# Patient Record
Sex: FEMALE | Race: WHITE | NOT HISPANIC OR LATINO | URBAN - NONMETROPOLITAN AREA
[De-identification: names, ages, dates, MRNs, and addresses within clinical notes are randomized per-mention and may not be internally consistent; named-entity substitution may affect disease eponyms.]

---

## 2017-03-29 ENCOUNTER — OFFICE VISIT (OUTPATIENT)
Dept: GASTROENTEROLOGY | Facility: CLINIC | Age: 62
End: 2017-03-29

## 2017-03-29 VITALS
BODY MASS INDEX: 30.12 KG/M2 | RESPIRATION RATE: 18 BRPM | DIASTOLIC BLOOD PRESSURE: 79 MMHG | TEMPERATURE: 97 F | HEART RATE: 75 BPM | SYSTOLIC BLOOD PRESSURE: 140 MMHG | WEIGHT: 170 LBS | HEIGHT: 63 IN

## 2017-03-29 DIAGNOSIS — R14.0 BLOATING: ICD-10-CM

## 2017-03-29 DIAGNOSIS — R14.2 BURPING: Primary | ICD-10-CM

## 2017-03-29 DIAGNOSIS — R12 HEARTBURN: ICD-10-CM

## 2017-03-29 DIAGNOSIS — K59.09 OTHER CONSTIPATION: ICD-10-CM

## 2017-03-29 PROCEDURE — 99214 OFFICE O/P EST MOD 30 MIN: CPT | Performed by: INTERNAL MEDICINE

## 2017-03-29 RX ORDER — FENOFIBRATE 160 MG/1
TABLET ORAL
Refills: 2 | COMMUNITY
Start: 2017-03-05

## 2017-03-29 RX ORDER — METOCLOPRAMIDE 5 MG/1
2.5 TABLET ORAL
Qty: 30 TABLET | Refills: 1 | Status: SHIPPED | OUTPATIENT
Start: 2017-03-29 | End: 2017-05-26 | Stop reason: SDUPTHER

## 2017-03-29 NOTE — PROGRESS NOTES
[address] [facility]  [address]  [facility] [address]    (H) [phone]  (W)     Chief Complaint   Patient presents with   • Follow-up     History of Present Illness    Patient came back for follow visit today.  She has been complaining of recurrent or pain and bloated feeling and upper abdomen.  She has been having such symptoms for the last couple of months.  The patient has been significantly anxious at times.  Anxiety worsens her symptoms.  She also feels as if her upper abdomen is distended.     The patient has a history of reflux off and on for the last several years.  The reflux is moderately severe.  Symptoms are described as retrosternal burning sensation, and indigestion.  There is history of occasional regurgitative symptoms.  Frequency being several times per week.  The symptoms are worse at night.  The patient takes acid suppressive therapy with reasonable control of his symptoms.    The patient has difficulty swallowing off and for the last 1 year or so. The symptom is moderate in severity, occurs occasionally perhaps once or twice a week and is mostly associated with solid foods.  The symptoms are not progressive.  The patient points towards the lower substernal area.  There is no associated weight loss.  The patient had undergone an upper endoscopy with serial dilation of the Schatzki's ring to 18 mm with significant improvement of her symptoms.  Currently the patient feels better.    The patient has a history of constipation off and on for the last several years. Severity is moderate. This is described as hard stools perhaps one bowel movement twice a week. The patient takes occasional stool softeners and laxatives to have a bowel movement. There is no associated rectal pain.  Her including the patient is taking MiraLAX 17 g on daily basis which leads to one bowel movement every other day.    There is no history of overt GI bleed (Hematemesis, melena or hematochezia).  She denies nausea or vomiting.   There is no abdominal pain.    Review of Systems   Constitutional: Positive for fatigue. Negative for appetite change, chills, fever and unexpected weight change.   HENT: Negative for mouth sores, nosebleeds and trouble swallowing.    Eyes: Negative for discharge and redness.   Respiratory: Negative for apnea, cough and shortness of breath.    Cardiovascular: Negative for chest pain, palpitations and leg swelling.   Gastrointestinal: Positive for abdominal distention, constipation and nausea (belching). Negative for abdominal pain, anal bleeding, blood in stool, diarrhea and vomiting.   Endocrine: Negative for cold intolerance, heat intolerance and polydipsia.   Genitourinary: Negative for dysuria, hematuria and urgency.   Musculoskeletal: Positive for arthralgias. Negative for joint swelling and myalgias.   Skin: Negative for rash.   Allergic/Immunologic: Negative for food allergies and immunocompromised state.   Neurological: Negative for dizziness, seizures, syncope and headaches.   Hematological: Negative for adenopathy. Does not bruise/bleed easily.   Psychiatric/Behavioral: Negative for dysphoric mood. The patient is not nervous/anxious and is not hyperactive.      There is no problem list on file for this patient.    Past Medical History:   Diagnosis Date   • Depression    • GERD (gastroesophageal reflux disease)    • Hyperlipidemia    • Panic attacks      Past Surgical History:   Procedure Laterality Date   • HYSTERECTOMY       Family History   Problem Relation Age of Onset   • Cirrhosis Mother    • Cirrhosis Brother    • Colon cancer Neg Hx      Social History   Substance Use Topics   • Smoking status: Current Every Day Smoker     Packs/day: 0.50     Types: Cigarettes   • Smokeless tobacco: Never Used   • Alcohol use No       Current Outpatient Prescriptions:   •  busPIRone (BUSPAR) 15 MG tablet, TAKE 1 TABLET TWICE A DAY, Disp: , Rfl: 0  •  Cholecalciferol (VITAMIN D3) 01603 UNITS capsule, TAKE 1 CAPSULE  "BY MOUTH ONCE WEEKLY, Disp: , Rfl: 2  •  docusate sodium (COLACE) 100 MG capsule, TAKE 2 CAPSULES BY MOUTH TWICE A DAY AS NEEDED FOR CONSTIPATION, Disp: , Rfl: 0  •  fenofibrate 160 MG tablet, TAKE 1 TABLET BY MOUTH DAILY, Disp: , Rfl: 2  •  ferrous sulfate 325 (65 FE) MG tablet, TAKE 1 TABLET BY MOUTH TWICE DAILY, Disp: , Rfl: 2  •  multivitamin (THERAGRAN) tablet tablet, TAKE 1 TABLET BY MOUTH ONCE DAILY, Disp: , Rfl: 2  •  pantoprazole (PROTONIX) 40 MG EC tablet, Take  by mouth daily., Disp: , Rfl:   •  polyethylene glycol (MIRALAX) powder, TAKE 17G IN 8OZ OF WATER EVERY DAY AS NEEDED, Disp: , Rfl: 2  •  QUEtiapine (SEROquel) 300 MG tablet, TAKE 1 TABLET BY MOUTH AT BEDTIME, Disp: , Rfl: 2  •  rosuvastatin (CRESTOR) 20 MG tablet, TAKE 1 TABLET BY MOUTH DAILY, Disp: , Rfl: 2  •  sertraline (ZOLOFT) 100 MG tablet, TAKE 1 TABLET TWICE A DAY, Disp: , Rfl: 2  •  simethicone (MYLICON) 80 MG chewable tablet, Chew 80 mg every 6 (six) hours as needed for flatulence., Disp: , Rfl:   •  tiZANidine (ZANAFLEX) 4 MG tablet, TAKE 1 TABLET BY MOUTH THREE TIMES A DAY AS NEEDED FOR SPASMS, Disp: , Rfl: 0  •  zolpidem (AMBIEN) 10 MG tablet, Take 10 mg by mouth at night as needed for sleep., Disp: , Rfl:   •  metoclopramide (REGLAN) 5 MG tablet, Take 0.5 tablets by mouth 2 (Two) Times a Day Before Meals., Disp: 30 tablet, Rfl: 1  Allergies   Allergen Reactions   • Penicillins Hives     /79  Pulse 75  Temp 97 °F (36.1 °C)  Resp 18  Ht 63\" (160 cm)  Wt 170 lb (77.1 kg)  BMI 30.11 kg/m2     Physical Exam   Constitutional: She is oriented to person, place, and time. She appears well-developed and well-nourished. No distress.   HENT:   Head: Normocephalic and atraumatic.   Right Ear: Hearing and external ear normal.   Left Ear: Hearing and external ear normal.   Nose: Nose normal.   Mouth/Throat: Oropharynx is clear and moist and mucous membranes are normal. Mucous membranes are not pale, not dry and not cyanotic. No oral " lesions. No oropharyngeal exudate.   Eyes: Conjunctivae and EOM are normal. Right eye exhibits no discharge. Left eye exhibits no discharge. No scleral icterus.   Neck: Trachea normal. Neck supple. No JVD present. No edema present. No thyroid mass and no thyromegaly present.   Cardiovascular: Normal rate, regular rhythm, S2 normal and normal heart sounds.  Exam reveals no gallop, no S3 and no friction rub.    No murmur heard.  Pulmonary/Chest: Effort normal and breath sounds normal. No respiratory distress. She has no wheezes. She has no rales. She exhibits no tenderness.   Abdominal: Soft. Normal appearance and bowel sounds are normal. She exhibits no distension, no ascites and no mass. There is no splenomegaly or hepatomegaly. There is no tenderness. There is no rigidity, no rebound and no guarding. No hernia.   Musculoskeletal: She exhibits no tenderness or deformity.       Vascular Status -  Her exam exhibits no right foot edema. Her exam exhibits no left foot edema.  Lymphadenopathy:     She has no cervical adenopathy.        Left: No supraclavicular adenopathy present.   Neurological: She is alert and oriented to person, place, and time. She has normal strength. No cranial nerve deficit or sensory deficit. She exhibits normal muscle tone. Coordination normal.   Skin: No rash noted. She is not diaphoretic. No cyanosis. No pallor. Nails show no clubbing.   Psychiatric: She has a normal mood and affect. Her behavior is normal. Judgment and thought content normal.   Nursing note and vitals reviewed.     Laboratory Tests:    Upon review of medical records:    Colonoscopy dated 07/15/2014: Diverticulosis involving the left colon with an occasional diverticulum within the ascending colon, colon polyps, small submucosal nodule within the proximal descending colon at 55 cm from anal verge, internal hemorrhoids. Cecum polyp and ascending colon polyp biopsy revealed tubular adenoma, Colon, biopsy at 55 cm revealed benign  colonic mucosa, no increased inflammation, adenomatous changes or hyperplastic changes are identified.     Dated October 3, 2016 Upper endoscopy revealed small sliding hiatal hernia less than 3 cm. Erythematous distal esophagitis. No Simons’s esophagus.  Schatzki's ring. This was dilated serially to 18 mm. Gastric lower body nodule. This may represent carcinoid. Gastritis. Second portion of duodenum, biopsies revealed benign small bowel mucosa with no diagnostic abnormality. Stomach, Lower Body, Nodule, biopsy revealed compatible with reactive gastropathy. Antrum, body and angulus, biopsies revealed reactive antral gastropathy. Mild chronic fundic gastritis.       Assessment and Plan:      There are no diagnoses linked to this encounter.      Discussion:       Plan     Patient Instructions     1. Anti-reflex measures.  2. Pantoprazole 40 mg 1 by mouth every morning one half hour before breakfast.  3. Short course of low-dose Reglan.Reglan (metoclopramide) 5 mg tablets.  Take one half tablet (2.5 mg) by mouth in the morning and in the evening (2 times daily preferably half an hour before food).   4. May take MiraLAX 1-1/4 In 8 ounce glass of water on daily basis.  5. Low fat-moderate lactose-diet with liberal water intake.  6. Weight loss.  7. Follow-up:  8 weeks.          Patient Care Team:  SORIN Carmichael as PCP - General    Rudi Carreon MD

## 2017-03-29 NOTE — PATIENT INSTRUCTIONS
1. Anti-reflex measures.  2. Pantoprazole 40 mg 1 by mouth every morning one half hour before breakfast.  3. Short course of low-dose Reglan.Reglan (metoclopramide) 5 mg tablets.  Take one half tablet (2.5 mg) by mouth in the morning and in the evening (2 times daily preferably half an hour before food).   4. May take MiraLAX 1-1/4 In 8 ounce glass of water on daily basis.  5. Low fat-moderate lactose-diet with liberal water intake.  6. Weight loss.  7. Follow-up:  8 weeks.  8. Discussed with the patient in detail.  Opportunity was given to ask questions.

## 2017-04-10 DIAGNOSIS — R12 HEARTBURN: Primary | ICD-10-CM

## 2017-04-10 RX ORDER — PANTOPRAZOLE SODIUM 40 MG/1
TABLET, DELAYED RELEASE ORAL
Qty: 30 TABLET | Refills: 5 | Status: SHIPPED | OUTPATIENT
Start: 2017-04-10

## 2017-04-13 ENCOUNTER — TELEPHONE (OUTPATIENT)
Dept: GASTROENTEROLOGY | Facility: CLINIC | Age: 62
End: 2017-04-13

## 2017-04-13 NOTE — TELEPHONE ENCOUNTER
90 plus 1 refill of Protonix called into [facility]. [facility] to [facility] 90 day supply instead of the 30.

## 2017-05-26 DIAGNOSIS — R12 HEARTBURN: Primary | ICD-10-CM

## 2017-05-26 RX ORDER — METOCLOPRAMIDE 5 MG/1
2.5 TABLET ORAL
Qty: 30 TABLET | Refills: 1 | Status: SHIPPED | OUTPATIENT
Start: 2017-05-26

## 2017-06-26 ENCOUNTER — TRANSCRIBE ORDERS (OUTPATIENT)
Dept: ULTRASOUND IMAGING | Facility: HOSPITAL | Age: 62
End: 2017-06-26

## 2017-06-26 DIAGNOSIS — R74.8 ABNORMAL LIVER ENZYMES: Primary | ICD-10-CM

## 2017-07-05 ENCOUNTER — HOSPITAL ENCOUNTER (OUTPATIENT)
Dept: ULTRASOUND IMAGING | Facility: HOSPITAL | Age: 62
Discharge: HOME | End: 2017-07-05
Admitting: NURSE PRACTITIONER

## 2017-07-05 DIAGNOSIS — R74.8 ABNORMAL LIVER ENZYMES: ICD-10-CM

## 2017-07-05 PROCEDURE — 76705 ECHO EXAM OF ABDOMEN: CPT

## 2017-07-31 DIAGNOSIS — R12 HEARTBURN: ICD-10-CM

## 2017-07-31 RX ORDER — METOCLOPRAMIDE 5 MG/1
TABLET ORAL
Qty: 30 TABLET | Refills: 0 | OUTPATIENT
Start: 2017-07-31

## 2018-05-11 ENCOUNTER — HOSPITAL ENCOUNTER (OUTPATIENT)
Dept: GENERAL RADIOLOGY | Facility: HOSPITAL | Age: 63
Discharge: HOME | End: 2018-05-11
Admitting: NURSE PRACTITIONER

## 2018-05-11 ENCOUNTER — TRANSCRIBE ORDERS (OUTPATIENT)
Dept: GENERAL RADIOLOGY | Facility: HOSPITAL | Age: 63
End: 2018-05-11

## 2018-05-11 DIAGNOSIS — R60.9 EDEMA, UNSPECIFIED TYPE: ICD-10-CM

## 2018-05-11 DIAGNOSIS — R52 PAIN: ICD-10-CM

## 2018-05-11 DIAGNOSIS — R52 PAIN: Primary | ICD-10-CM

## 2018-05-11 PROCEDURE — 73610 X-RAY EXAM OF ANKLE: CPT

## 2019-01-18 ENCOUNTER — TRANSCRIBE ORDERS (OUTPATIENT)
Dept: ADMINISTRATIVE | Facility: HOSPITAL | Age: 64
End: 2019-01-18

## 2019-01-18 DIAGNOSIS — Z78.0 OSTEOPENIA AFTER MENOPAUSE: ICD-10-CM

## 2019-01-18 DIAGNOSIS — M85.80 OSTEOPENIA AFTER MENOPAUSE: ICD-10-CM

## 2019-01-18 DIAGNOSIS — Z12.39 SCREENING BREAST EXAMINATION: Primary | ICD-10-CM

## 2019-02-27 ENCOUNTER — APPOINTMENT (OUTPATIENT)
Dept: BONE DENSITY | Facility: HOSPITAL | Age: 64
End: 2019-02-27

## 2019-02-27 ENCOUNTER — HOSPITAL ENCOUNTER (OUTPATIENT)
Dept: MAMMOGRAPHY | Facility: HOSPITAL | Age: 64
Discharge: HOME | End: 2019-02-27
Admitting: NURSE PRACTITIONER

## 2019-02-27 DIAGNOSIS — M85.80 OSTEOPENIA AFTER MENOPAUSE: ICD-10-CM

## 2019-02-27 DIAGNOSIS — Z78.0 OSTEOPENIA AFTER MENOPAUSE: ICD-10-CM

## 2019-02-27 DIAGNOSIS — Z12.39 SCREENING BREAST EXAMINATION: ICD-10-CM

## 2019-02-27 PROCEDURE — 77063 BREAST TOMOSYNTHESIS BI: CPT

## 2019-02-27 PROCEDURE — 77067 SCR MAMMO BI INCL CAD: CPT

## 2019-02-27 PROCEDURE — 77080 DXA BONE DENSITY AXIAL: CPT

## 2020-03-16 ENCOUNTER — TRANSCRIBE ORDERS (OUTPATIENT)
Dept: ADMINISTRATIVE | Facility: HOSPITAL | Age: 65
End: 2020-03-16

## 2020-03-16 DIAGNOSIS — Z12.31 VISIT FOR SCREENING MAMMOGRAM: Primary | ICD-10-CM

## 2020-05-05 ENCOUNTER — APPOINTMENT (OUTPATIENT)
Dept: MAMMOGRAPHY | Facility: HOSPITAL | Age: 65
End: 2020-05-05

## 2020-06-15 ENCOUNTER — HOSPITAL ENCOUNTER (OUTPATIENT)
Dept: MAMMOGRAPHY | Facility: HOSPITAL | Age: 65
Discharge: HOME | End: 2020-06-15
Admitting: INTERNAL MEDICINE

## 2020-06-15 DIAGNOSIS — Z12.31 VISIT FOR SCREENING MAMMOGRAM: ICD-10-CM

## 2020-06-15 PROCEDURE — 77063 BREAST TOMOSYNTHESIS BI: CPT

## 2020-06-15 PROCEDURE — 77067 SCR MAMMO BI INCL CAD: CPT

## 2021-07-23 ENCOUNTER — TRANSCRIBE ORDERS (OUTPATIENT)
Dept: ADMINISTRATIVE | Facility: HOSPITAL | Age: 66
End: 2021-07-23

## 2021-07-23 DIAGNOSIS — Z12.31 VISIT FOR SCREENING MAMMOGRAM: Primary | ICD-10-CM

## 2021-09-08 ENCOUNTER — HOSPITAL ENCOUNTER (OUTPATIENT)
Dept: MAMMOGRAPHY | Facility: HOSPITAL | Age: 66
Discharge: HOME | End: 2021-09-08
Admitting: INTERNAL MEDICINE

## 2021-09-08 DIAGNOSIS — Z12.31 VISIT FOR SCREENING MAMMOGRAM: ICD-10-CM

## 2021-09-08 PROCEDURE — 77063 BREAST TOMOSYNTHESIS BI: CPT

## 2021-09-08 PROCEDURE — 77067 SCR MAMMO BI INCL CAD: CPT

## 2022-05-19 ENCOUNTER — TRANSCRIBE ORDERS (OUTPATIENT)
Dept: ADMINISTRATIVE | Facility: HOSPITAL | Age: 67
End: 2022-05-19

## 2022-05-19 DIAGNOSIS — Z12.31 VISIT FOR SCREENING MAMMOGRAM: ICD-10-CM

## 2022-05-19 DIAGNOSIS — N95.8 POSTARTIFICIAL MENOPAUSAL SYNDROME: Primary | ICD-10-CM

## 2022-05-19 DIAGNOSIS — Z13.820 ENCOUNTER FOR SCREENING FOR OSTEOPOROSIS: ICD-10-CM

## 2022-07-08 ENCOUNTER — APPOINTMENT (OUTPATIENT)
Dept: BONE DENSITY | Facility: HOSPITAL | Age: 67
End: 2022-07-08

## 2022-07-08 DIAGNOSIS — N95.8 POSTARTIFICIAL MENOPAUSAL SYNDROME: ICD-10-CM

## 2022-07-08 PROCEDURE — 77080 DXA BONE DENSITY AXIAL: CPT

## 2022-09-09 ENCOUNTER — HOSPITAL ENCOUNTER (OUTPATIENT)
Dept: MAMMOGRAPHY | Facility: HOSPITAL | Age: 67
Discharge: HOME | End: 2022-09-09
Admitting: INTERNAL MEDICINE

## 2022-09-09 DIAGNOSIS — Z12.31 VISIT FOR SCREENING MAMMOGRAM: ICD-10-CM

## 2022-09-09 PROCEDURE — 77067 SCR MAMMO BI INCL CAD: CPT

## 2022-09-09 PROCEDURE — 77063 BREAST TOMOSYNTHESIS BI: CPT

## 2023-04-25 ENCOUNTER — HOSPITAL ENCOUNTER (EMERGENCY)
Facility: HOSPITAL | Age: 68
Discharge: HOME | DRG: 603 | End: 2023-04-25
Attending: EMERGENCY MEDICINE
Payer: MEDICARE

## 2023-04-25 VITALS
HEIGHT: 63 IN | WEIGHT: 160.4 LBS | HEART RATE: 85 BPM | OXYGEN SATURATION: 99 % | RESPIRATION RATE: 18 BRPM | SYSTOLIC BLOOD PRESSURE: 173 MMHG | DIASTOLIC BLOOD PRESSURE: 63 MMHG | TEMPERATURE: 98.6 F | BODY MASS INDEX: 28.42 KG/M2

## 2023-04-25 DIAGNOSIS — L02.211 ABDOMINAL WALL ABSCESS: Primary | ICD-10-CM

## 2023-04-25 DIAGNOSIS — L03.311 CELLULITIS OF ABDOMINAL WALL: ICD-10-CM

## 2023-04-25 LAB
DEPRECATED RDW RBC AUTO: 46.5 FL (ref 37–54)
EOSINOPHIL # BLD MANUAL: 0.61 10*3/MM3 (ref 0–0.4)
EOSINOPHIL NFR BLD MANUAL: 5 % (ref 0.3–6.2)
ERYTHROCYTE [DISTWIDTH] IN BLOOD BY AUTOMATED COUNT: 14 % (ref 12.3–15.4)
HCT VFR BLD AUTO: 36.4 % (ref 34–46.6)
HGB BLD-MCNC: 12.2 G/DL (ref 12–15.9)
LYMPHOCYTES # BLD MANUAL: 1.47 10*3/MM3 (ref 0.7–3.1)
LYMPHOCYTES NFR BLD MANUAL: 4 % (ref 5–12)
MCH RBC QN AUTO: 30.2 PG (ref 26.6–33)
MCHC RBC AUTO-ENTMCNC: 33.5 G/DL (ref 31.5–35.7)
MCV RBC AUTO: 90.1 FL (ref 79–97)
MONOCYTES # BLD: 0.49 10*3/MM3 (ref 0.1–0.9)
NEUTROPHILS # BLD AUTO: 9.65 10*3/MM3 (ref 1.7–7)
NEUTROPHILS NFR BLD MANUAL: 79 % (ref 42.7–76)
PLATELET # BLD AUTO: 211 10*3/MM3 (ref 140–450)
PMV BLD AUTO: 11.2 FL (ref 6–12)
RBC # BLD AUTO: 4.04 10*6/MM3 (ref 3.77–5.28)
RBC MORPH BLD: NORMAL
SCAN SLIDE: NORMAL
SMALL PLATELETS BLD QL SMEAR: ADEQUATE
VARIANT LYMPHS NFR BLD MANUAL: 12 % (ref 19.6–45.3)
WBC MORPH BLD: NORMAL
WBC NRBC COR # BLD: 12.22 10*3/MM3 (ref 3.4–10.8)

## 2023-04-25 PROCEDURE — 85025 COMPLETE CBC W/AUTO DIFF WBC: CPT | Performed by: NURSE PRACTITIONER

## 2023-04-25 PROCEDURE — 36415 COLL VENOUS BLD VENIPUNCTURE: CPT

## 2023-04-25 PROCEDURE — 0J980ZZ DRAINAGE OF ABDOMEN SUBCUTANEOUS TISSUE AND FASCIA, OPEN APPROACH: ICD-10-PCS | Performed by: EMERGENCY MEDICINE

## 2023-04-25 PROCEDURE — 87147 CULTURE TYPE IMMUNOLOGIC: CPT | Performed by: NURSE PRACTITIONER

## 2023-04-25 PROCEDURE — 85007 BL SMEAR W/DIFF WBC COUNT: CPT | Performed by: NURSE PRACTITIONER

## 2023-04-25 PROCEDURE — 87186 SC STD MICRODIL/AGAR DIL: CPT | Performed by: NURSE PRACTITIONER

## 2023-04-25 PROCEDURE — 87070 CULTURE OTHR SPECIMN AEROBIC: CPT | Performed by: NURSE PRACTITIONER

## 2023-04-25 PROCEDURE — 99283 EMERGENCY DEPT VISIT LOW MDM: CPT

## 2023-04-25 RX ORDER — HYDROCODONE BITARTRATE AND ACETAMINOPHEN 5; 325 MG/1; MG/1
1 TABLET ORAL ONCE
Status: COMPLETED | OUTPATIENT
Start: 2023-04-25 | End: 2023-04-25

## 2023-04-25 RX ORDER — SULFAMETHOXAZOLE AND TRIMETHOPRIM 800; 160 MG/1; MG/1
1 TABLET ORAL 2 TIMES DAILY
Qty: 20 TABLET | Refills: 0 | Status: SHIPPED | OUTPATIENT
Start: 2023-04-25 | End: 2023-05-05

## 2023-04-25 RX ORDER — SULFAMETHOXAZOLE AND TRIMETHOPRIM 800; 160 MG/1; MG/1
1 TABLET ORAL ONCE
Status: COMPLETED | OUTPATIENT
Start: 2023-04-25 | End: 2023-04-25

## 2023-04-25 RX ORDER — LIDOCAINE HYDROCHLORIDE AND EPINEPHRINE BITARTRATE 20; .01 MG/ML; MG/ML
10 INJECTION, SOLUTION SUBCUTANEOUS ONCE
Status: COMPLETED | OUTPATIENT
Start: 2023-04-25 | End: 2023-04-25

## 2023-04-25 RX ADMIN — LIDOCAINE HYDROCHLORIDE AND EPINEPHRINE 10 ML: 20; 10 INJECTION, SOLUTION INFILTRATION; PERINEURAL at 20:09

## 2023-04-25 RX ADMIN — SULFAMETHOXAZOLE AND TRIMETHOPRIM 1 TABLET: 800; 160 TABLET ORAL at 20:44

## 2023-04-25 RX ADMIN — HYDROCODONE BITARTRATE AND ACETAMINOPHEN 1 TABLET: 5; 325 TABLET ORAL at 20:44

## 2023-04-25 NOTE — ED PROVIDER NOTES
Subjective  History of Present Illness:    Chief Complaint: Abscess  History of Present Illness: 67-year-old female patient comes into the ED today complaining of an abscess on her peritoneal area.      Nurses Notes reviewed and agree, including vitals, allergies, social history and prior medical history.       Allergies:    Penicillins      Past Surgical History:   Procedure Laterality Date   • HYSTERECTOMY           Social History     Socioeconomic History   • Marital status:    Tobacco Use   • Smoking status: Every Day     Packs/day: 0.50     Types: Cigarettes   • Smokeless tobacco: Never   Substance and Sexual Activity   • Alcohol use: No   • Drug use: No         Family History   Problem Relation Age of Onset   • Cirrhosis Mother    • Cirrhosis Brother    • Breast cancer Sister    • Colon cancer Neg Hx        REVIEW OF SYSTEMS: All systems reviewed and not pertinent unless noted.    Review of Systems   Skin: Positive for color change and wound.   All other systems reviewed and are negative.      Objective    Physical Exam  Vitals and nursing note reviewed.   Constitutional:       Appearance: Normal appearance.   HENT:      Head: Normocephalic and atraumatic.   Eyes:      Extraocular Movements: Extraocular movements intact.      Pupils: Pupils are equal, round, and reactive to light.   Cardiovascular:      Rate and Rhythm: Normal rate and regular rhythm.      Pulses: Normal pulses.      Heart sounds: Normal heart sounds.   Pulmonary:      Effort: Pulmonary effort is normal.      Breath sounds: Normal breath sounds.   Abdominal:      General: Abdomen is flat. Bowel sounds are normal.      Palpations: Abdomen is soft.   Musculoskeletal:      Cervical back: Normal range of motion and neck supple.   Skin:     General: Skin is warm and dry.      Capillary Refill: Capillary refill takes less than 2 seconds.      Findings: Erythema present.      Comments: Moderate erythema surrounding area of abscess that has  purulent drainage present   Neurological:      General: No focal deficit present.      Mental Status: She is alert and oriented to person, place, and time. Mental status is at baseline.      GCS: GCS eye subscore is 4. GCS verbal subscore is 5. GCS motor subscore is 6.      Sensory: Sensation is intact.      Motor: Motor function is intact.      Gait: Gait is intact.   Psychiatric:         Attention and Perception: Attention and perception normal.         Mood and Affect: Mood and affect normal.         Speech: Speech normal.         Behavior: Behavior normal. Behavior is cooperative.           Incision & Drainage    Date/Time: 4/25/2023 8:26 PM  Performed by: Clive Fraire APRN  Authorized by: Mike Colbert MD     Consent:     Consent obtained:  Verbal    Consent given by:  Patient    Risks discussed:  Bleeding, incomplete drainage, infection and pain    Alternatives discussed:  No treatment, delayed treatment and alternative treatment  Universal protocol:     Procedure explained and questions answered to patient or proxy's satisfaction: yes      Relevant documents present and verified: yes      Test results available : no      Imaging studies available: no      Required blood products, implants, devices, and special equipment available: no      Site/side marked: yes      Immediately prior to procedure, a time out was called: yes      Patient identity confirmed:  Verbally with patient  Location:     Type:  Abscess    Location:  Trunk    Trunk location:  Abdomen  Pre-procedure details:     Skin preparation:  Chlorhexidine with alcohol  Sedation:     Sedation type:  None  Anesthesia:     Anesthesia method:  Local infiltration    Local anesthetic:  Lidocaine 2% WITH epi  Procedure type:     Complexity:  Simple  Procedure details:     Ultrasound guidance: no      Needle aspiration: yes      Incision types:  Stab incision    Incision depth:  Subcutaneous    Wound management:  Probed and deloculated     Drainage:  Bloody and purulent    Drainage amount:  Moderate    Wound treatment:  Wound left open    Packing materials:  None  Post-procedure details:     Procedure completion:  Tolerated        ED Course:    ED Course as of 04/25/23 2047 Tue Apr 25, 2023 2044 Strict return precautions given to patient [KH]      ED Course User Index  [KH] Fraire CliveSORIN walls       Lab Results (last 24 hours)     Procedure Component Value Units Date/Time    CBC & Differential [[id number]]  (Abnormal) Collected: 04/25/23 2009    Specimen: Blood Updated: 04/25/23 2039    Narrative:      The following orders were created for panel order CBC & Differential.  Procedure                               Abnormality         Status                     ---------                               -----------         ------                     CBC Auto Differential[[id number]]        Abnormal            Final result               Scan Slide[[id number]]                                       Final result                 Please view results for these tests on the individual orders.    CBC Auto Differential [[id number]]  (Abnormal) Collected: 04/25/23 2009    Specimen: Blood Updated: 04/25/23 2039     WBC 12.22 10*3/mm3      RBC 4.04 10*6/mm3      Hemoglobin 12.2 g/dL      Hematocrit 36.4 %      MCV 90.1 fL      MCH 30.2 pg      MCHC 33.5 g/dL      RDW 14.0 %      RDW-SD 46.5 fl      MPV 11.2 fL      Platelets 211 10*3/mm3     Scan Slide [[id number]] Collected: 04/25/23 2009    Specimen: Blood Updated: 04/25/23 2039     Scan Slide --     Comment: See Manual Differential Results       Manual Differential [[id number]]  (Abnormal) Collected: 04/25/23 2009    Specimen: Blood Updated: 04/25/23 2039     Neutrophil % 79.0 %      Lymphocyte % 12.0 %      Monocyte % 4.0 %      Eosinophil % 5.0 %      Neutrophils Absolute 9.65 10*3/mm3      Lymphocytes Absolute 1.47 10*3/mm3      Monocytes Absolute 0.49 10*3/mm3      Eosinophils Absolute 0.61 10*3/mm3      RBC  Morphology Normal     WBC Morphology Normal     Platelet Estimate Adequate           No radiology results from the last 24 hrs     Medical Decision Making  67-year-old female patient comes into the ED today complaining of abscess and pain to lower abdominal wall.    Physical exam neuro GCS 15 alert and orient x3 responds appropriately questions cardiac regular rate and rhythm S1-S2 positive pulses bilateral upper and lower extremity respiratory clear to auscultation bilaterally abdomen mild tenderness to palpation   lower abdominal wall                                                                                                                                                                                                                             Abdominal wall abscess: acute illness or injury  Cellulitis of abdominal wall: acute illness or injury  Amount and/or Complexity of Data Reviewed  Labs: ordered. Decision-making details documented in ED Course.  Discussion of management or test interpretation with external provider(s): Discussed assessment, treatment and plan with ER attending    Risk  Prescription drug management.    Risk Details: Patient has been diagnosed with abdominal wall abscess, cellulitis and will be discharged home.  Patient requested to follow-up with primary care provider within the next 7 days for reevaluation.                  Final diagnoses:   Abdominal wall abscess   Cellulitis of abdominal wall        Clive Fraire, SORIN  04/25/23 [id number]

## 2023-04-26 NOTE — DISCHARGE INSTRUCTIONS
After 3 doses of antibiotics if pain, swelling has and erythema has not improved please return to the emergency room for reevaluation.

## 2023-04-27 ENCOUNTER — HOSPITAL ENCOUNTER (INPATIENT)
Facility: HOSPITAL | Age: 68
LOS: 1 days | Discharge: HOME | DRG: 603 | End: 2023-04-29
Attending: EMERGENCY MEDICINE | Admitting: INTERNAL MEDICINE
Payer: MEDICARE

## 2023-04-27 ENCOUNTER — APPOINTMENT (OUTPATIENT)
Dept: CT IMAGING | Facility: HOSPITAL | Age: 68
DRG: 603 | End: 2023-04-27
Payer: MEDICARE

## 2023-04-27 DIAGNOSIS — L03.319 CELLULITIS OF SUPRAPUBIC REGION: Primary | ICD-10-CM

## 2023-04-27 LAB
ALBUMIN SERPL-MCNC: 3.4 G/DL (ref 3.5–5.2)
ALBUMIN/GLOB SERPL: 1.1 G/DL
ALP SERPL-CCNC: 51 U/L (ref 39–117)
ALT SERPL W P-5'-P-CCNC: 11 U/L (ref 1–33)
ANION GAP SERPL CALCULATED.3IONS-SCNC: 15.1 MMOL/L (ref 5–15)
AST SERPL-CCNC: 26 U/L (ref 1–32)
BASOPHILS # BLD AUTO: 0.04 10*3/MM3 (ref 0–0.2)
BASOPHILS NFR BLD AUTO: 0.3 % (ref 0–1.5)
BILIRUB SERPL-MCNC: 0.5 MG/DL (ref 0–1.2)
BUN SERPL-MCNC: 25 MG/DL (ref 8–23)
BUN/CREAT SERPL: 21.6 (ref 7–25)
CALCIUM SPEC-SCNC: 9.4 MG/DL (ref 8.6–10.5)
CHLORIDE SERPL-SCNC: 99 MMOL/L (ref 98–107)
CO2 SERPL-SCNC: 17.9 MMOL/L (ref 22–29)
CREAT SERPL-MCNC: 1.16 MG/DL (ref 0.57–1)
CRP SERPL-MCNC: 29.82 MG/DL (ref 0–0.5)
D-LACTATE SERPL-SCNC: 1.4 MMOL/L (ref 0.5–2)
DEPRECATED RDW RBC AUTO: 45 FL (ref 37–54)
EGFRCR SERPLBLD CKD-EPI 2021: 52.1 ML/MIN/1.73
EOSINOPHIL # BLD AUTO: 0.07 10*3/MM3 (ref 0–0.4)
EOSINOPHIL NFR BLD AUTO: 0.5 % (ref 0.3–6.2)
ERYTHROCYTE [DISTWIDTH] IN BLOOD BY AUTOMATED COUNT: 14 % (ref 12.3–15.4)
ERYTHROCYTE [SEDIMENTATION RATE] IN BLOOD: 34 MM/HR (ref 0–30)
GLOBULIN UR ELPH-MCNC: 3.1 GM/DL
GLUCOSE SERPL-MCNC: 105 MG/DL (ref 65–99)
HCT VFR BLD AUTO: 35.6 % (ref 34–46.6)
HGB BLD-MCNC: 12 G/DL (ref 12–15.9)
HOLD SPECIMEN: NORMAL
HOLD SPECIMEN: NORMAL
IMM GRANULOCYTES # BLD AUTO: 0.08 10*3/MM3 (ref 0–0.05)
IMM GRANULOCYTES NFR BLD AUTO: 0.6 % (ref 0–0.5)
LYMPHOCYTES # BLD AUTO: 1.67 10*3/MM3 (ref 0.7–3.1)
LYMPHOCYTES NFR BLD AUTO: 12 % (ref 19.6–45.3)
MCH RBC QN AUTO: 29.6 PG (ref 26.6–33)
MCHC RBC AUTO-ENTMCNC: 33.7 G/DL (ref 31.5–35.7)
MCV RBC AUTO: 87.7 FL (ref 79–97)
MONOCYTES # BLD AUTO: 1.09 10*3/MM3 (ref 0.1–0.9)
MONOCYTES NFR BLD AUTO: 7.9 % (ref 5–12)
NEUTROPHILS NFR BLD AUTO: 10.91 10*3/MM3 (ref 1.7–7)
NEUTROPHILS NFR BLD AUTO: 78.7 % (ref 42.7–76)
NRBC BLD AUTO-RTO: 0 /100 WBC (ref 0–0.2)
PLATELET # BLD AUTO: 220 10*3/MM3 (ref 140–450)
PMV BLD AUTO: 11.5 FL (ref 6–12)
POTASSIUM SERPL-SCNC: 3.3 MMOL/L (ref 3.5–5.2)
PROCALCITONIN SERPL-MCNC: 1.19 NG/ML (ref 0–0.25)
PROT SERPL-MCNC: 6.5 G/DL (ref 6–8.5)
RBC # BLD AUTO: 4.06 10*6/MM3 (ref 3.77–5.28)
SODIUM SERPL-SCNC: 132 MMOL/L (ref 136–145)
T4 FREE SERPL-MCNC: 1.33 NG/DL (ref 0.93–1.7)
WBC NRBC COR # BLD: 13.86 10*3/MM3 (ref 3.4–10.8)
WHOLE BLOOD HOLD COAG: NORMAL
WHOLE BLOOD HOLD SPECIMEN: NORMAL

## 2023-04-27 PROCEDURE — 25010000002 ONDANSETRON PER 1 MG: Performed by: EMERGENCY MEDICINE

## 2023-04-27 PROCEDURE — 84145 PROCALCITONIN (PCT): CPT | Performed by: PHYSICIAN ASSISTANT

## 2023-04-27 PROCEDURE — 25010000002 VANCOMYCIN 5 G RECONSTITUTED SOLUTION: Performed by: PHYSICIAN ASSISTANT

## 2023-04-27 PROCEDURE — 80053 COMPREHEN METABOLIC PANEL: CPT | Performed by: EMERGENCY MEDICINE

## 2023-04-27 PROCEDURE — 84439 ASSAY OF FREE THYROXINE: CPT | Performed by: INTERNAL MEDICINE

## 2023-04-27 PROCEDURE — 83605 ASSAY OF LACTIC ACID: CPT | Performed by: PHYSICIAN ASSISTANT

## 2023-04-27 PROCEDURE — 74177 CT ABD & PELVIS W/CONTRAST: CPT

## 2023-04-27 PROCEDURE — 86140 C-REACTIVE PROTEIN: CPT | Performed by: PHYSICIAN ASSISTANT

## 2023-04-27 PROCEDURE — 96375 TX/PRO/DX INJ NEW DRUG ADDON: CPT

## 2023-04-27 PROCEDURE — 25510000001 IOPAMIDOL 61 % SOLUTION: Performed by: EMERGENCY MEDICINE

## 2023-04-27 PROCEDURE — 96365 THER/PROPH/DIAG IV INF INIT: CPT

## 2023-04-27 PROCEDURE — 99284 EMERGENCY DEPT VISIT MOD MDM: CPT

## 2023-04-27 PROCEDURE — 87040 BLOOD CULTURE FOR BACTERIA: CPT | Performed by: PHYSICIAN ASSISTANT

## 2023-04-27 PROCEDURE — 85652 RBC SED RATE AUTOMATED: CPT | Performed by: PHYSICIAN ASSISTANT

## 2023-04-27 PROCEDURE — G0378 HOSPITAL OBSERVATION PER HR: HCPCS

## 2023-04-27 PROCEDURE — 85025 COMPLETE CBC W/AUTO DIFF WBC: CPT | Performed by: EMERGENCY MEDICINE

## 2023-04-27 PROCEDURE — 96361 HYDRATE IV INFUSION ADD-ON: CPT

## 2023-04-27 PROCEDURE — 36415 COLL VENOUS BLD VENIPUNCTURE: CPT

## 2023-04-27 PROCEDURE — 25010000002 MORPHINE PER 10 MG: Performed by: EMERGENCY MEDICINE

## 2023-04-27 RX ORDER — PRAVASTATIN SODIUM 20 MG
40 TABLET ORAL NIGHTLY
Status: DISCONTINUED | OUTPATIENT
Start: 2023-04-27 | End: 2023-04-29 | Stop reason: HOSPADM

## 2023-04-27 RX ORDER — TRAZODONE HYDROCHLORIDE 50 MG/1
100 TABLET ORAL NIGHTLY
COMMUNITY

## 2023-04-27 RX ORDER — DULOXETIN HYDROCHLORIDE 30 MG/1
30 CAPSULE, DELAYED RELEASE ORAL DAILY
Status: DISCONTINUED | OUTPATIENT
Start: 2023-04-28 | End: 2023-04-29 | Stop reason: HOSPADM

## 2023-04-27 RX ORDER — GABAPENTIN 400 MG/1
400 CAPSULE ORAL NIGHTLY
Status: DISCONTINUED | OUTPATIENT
Start: 2023-04-27 | End: 2023-04-29 | Stop reason: HOSPADM

## 2023-04-27 RX ORDER — MORPHINE SULFATE 2 MG/ML
2 INJECTION, SOLUTION INTRAMUSCULAR; INTRAVENOUS ONCE
Status: COMPLETED | OUTPATIENT
Start: 2023-04-27 | End: 2023-04-27

## 2023-04-27 RX ORDER — DOCUSATE SODIUM 100 MG/1
100 CAPSULE, LIQUID FILLED ORAL 2 TIMES DAILY
Status: DISCONTINUED | OUTPATIENT
Start: 2023-04-27 | End: 2023-04-29 | Stop reason: HOSPADM

## 2023-04-27 RX ORDER — PANTOPRAZOLE SODIUM 40 MG/1
40 TABLET, DELAYED RELEASE ORAL
Status: DISCONTINUED | OUTPATIENT
Start: 2023-04-28 | End: 2023-04-29 | Stop reason: HOSPADM

## 2023-04-27 RX ORDER — FENOFIBRATE 145 MG/1
145 TABLET, COATED ORAL DAILY
Status: DISCONTINUED | OUTPATIENT
Start: 2023-04-28 | End: 2023-04-29 | Stop reason: HOSPADM

## 2023-04-27 RX ORDER — CETIRIZINE HYDROCHLORIDE 10 MG/1
10 TABLET ORAL DAILY
COMMUNITY

## 2023-04-27 RX ORDER — GABAPENTIN 400 MG/1
400 CAPSULE ORAL NIGHTLY
COMMUNITY

## 2023-04-27 RX ORDER — ZOLPIDEM TARTRATE 5 MG/1
10 TABLET ORAL NIGHTLY PRN
Status: DISCONTINUED | OUTPATIENT
Start: 2023-04-27 | End: 2023-04-29 | Stop reason: HOSPADM

## 2023-04-27 RX ORDER — DULOXETIN HYDROCHLORIDE 30 MG/1
30 CAPSULE, DELAYED RELEASE ORAL DAILY
COMMUNITY

## 2023-04-27 RX ORDER — PRAVASTATIN SODIUM 40 MG
40 TABLET ORAL NIGHTLY
COMMUNITY
End: 2023-04-29 | Stop reason: HOSPADM

## 2023-04-27 RX ORDER — SIMETHICONE 80 MG
80 TABLET,CHEWABLE ORAL EVERY 6 HOURS PRN
Status: DISCONTINUED | OUTPATIENT
Start: 2023-04-27 | End: 2023-04-29 | Stop reason: HOSPADM

## 2023-04-27 RX ORDER — ZOLPIDEM TARTRATE 5 MG/1
10 TABLET ORAL NIGHTLY PRN
Status: DISCONTINUED | OUTPATIENT
Start: 2023-04-27 | End: 2023-04-27 | Stop reason: SDUPTHER

## 2023-04-27 RX ORDER — METRONIDAZOLE 500 MG/100ML
500 INJECTION, SOLUTION INTRAVENOUS ONCE
Status: COMPLETED | OUTPATIENT
Start: 2023-04-27 | End: 2023-04-27

## 2023-04-27 RX ORDER — ONDANSETRON 2 MG/ML
4 INJECTION INTRAMUSCULAR; INTRAVENOUS ONCE
Status: COMPLETED | OUTPATIENT
Start: 2023-04-27 | End: 2023-04-27

## 2023-04-27 RX ORDER — SODIUM CHLORIDE 0.9 % (FLUSH) 0.9 %
10 SYRINGE (ML) INJECTION AS NEEDED
Status: DISCONTINUED | OUTPATIENT
Start: 2023-04-27 | End: 2023-04-29 | Stop reason: HOSPADM

## 2023-04-27 RX ADMIN — MORPHINE SULFATE 2 MG: 2 INJECTION, SOLUTION INTRAMUSCULAR; INTRAVENOUS at 18:19

## 2023-04-27 RX ADMIN — IOPAMIDOL 100 ML: 612 INJECTION, SOLUTION INTRAVENOUS at 18:47

## 2023-04-27 RX ADMIN — PRAVASTATIN SODIUM 40 MG: 20 TABLET ORAL at 22:31

## 2023-04-27 RX ADMIN — SODIUM CHLORIDE 1000 ML: 9 INJECTION, SOLUTION INTRAVENOUS at 20:00

## 2023-04-27 RX ADMIN — ONDANSETRON 4 MG: 2 INJECTION INTRAMUSCULAR; INTRAVENOUS at 18:20

## 2023-04-27 RX ADMIN — SODIUM CHLORIDE 1000 ML: 9 INJECTION, SOLUTION INTRAVENOUS at 18:20

## 2023-04-27 RX ADMIN — ZOLPIDEM TARTRATE 10 MG: 5 TABLET ORAL at 22:31

## 2023-04-27 RX ADMIN — METRONIDAZOLE 500 MG: 5 INJECTION, SOLUTION INTRAVENOUS at 19:28

## 2023-04-27 RX ADMIN — VANCOMYCIN HYDROCHLORIDE 1500 MG: 5 INJECTION, POWDER, LYOPHILIZED, FOR SOLUTION INTRAVENOUS at 20:44

## 2023-04-27 RX ADMIN — GABAPENTIN 400 MG: 400 CAPSULE ORAL at 22:31

## 2023-04-27 RX ADMIN — DOCUSATE SODIUM 100 MG: 100 CAPSULE, LIQUID FILLED ORAL at 22:39

## 2023-04-27 RX ADMIN — AZTREONAM 2 G: 2 INJECTION, POWDER, LYOPHILIZED, FOR SOLUTION INTRAMUSCULAR; INTRAVENOUS at 18:54

## 2023-04-27 NOTE — ED PROVIDER NOTES
"Subjective  History of Present Illness:    Chief Complaint: Abscess  History of Present Illness: 67-year-old female presents with an abscess in her lower abdominal area, she states its been there for approximately 4 days, seen in the emergency department 2 days ago and had an incision and drainage of the area, but she states that it has not improved and appears to be getting worse.  The area is painful, red, and swollen.  It is very tender to touch  Onset: Gradual onset  Duration: 4 days  Exacerbating / Alleviating factors: Painful to touch and with movement red and swollen  Associated symptoms: Pain      Nurses Notes reviewed and agree, including vitals, allergies, social history and prior medical history.     REVIEW OF SYSTEMS: All systems reviewed and not pertinent unless noted.    Review of Systems   Skin:        Abscess lower abdomen   All other systems reviewed and are negative.      Past Medical History:   Diagnosis Date   • Depression    • GERD (gastroesophageal reflux disease)    • Hyperlipidemia    • Panic attacks        Allergies:    Penicillins      Past Surgical History:   Procedure Laterality Date   • HYSTERECTOMY           Social History     Socioeconomic History   • Marital status:    Tobacco Use   • Smoking status: Every Day     Packs/day: 0.50     Types: Cigarettes   • Smokeless tobacco: Never   Vaping Use   • Vaping Use: Every day   Substance and Sexual Activity   • Alcohol use: No   • Drug use: No   • Sexual activity: Defer         Family History   Problem Relation Age of Onset   • Cirrhosis Mother    • Cirrhosis Brother    • Breast cancer Sister    • Colon cancer Neg Hx        Objective  Physical Exam:  /63 (BP Location: Left arm, Patient Position: Sitting)   Pulse 86   Temp 97.3 °F (36.3 °C) (Oral)   Resp 18   Ht 160 cm (63\")   Wt 72.6 kg (160 lb)   SpO2 98%   BMI 28.34 kg/m²      Physical Exam  Vitals and nursing note reviewed.   Constitutional:       Appearance: She is " well-developed.   HENT:      Head: Normocephalic and atraumatic.   Cardiovascular:      Rate and Rhythm: Normal rate and regular rhythm.   Pulmonary:      Effort: Pulmonary effort is normal.      Breath sounds: Normal breath sounds.   Abdominal:          Comments: Cellulitis extending from the right groin over the suprapubic area inferiorly to the vaginal area, with a large fluctuating indurated area in the upper portion of the cellulitis, very painful to touch warm   Musculoskeletal:         General: Normal range of motion.      Cervical back: Normal range of motion and neck supple.   Skin:     General: Skin is warm and dry.   Neurological:      Mental Status: She is alert and oriented to person, place, and time.      Deep Tendon Reflexes: Reflexes are normal and symmetric.           Procedures    ED Course:    ED Course as of 04/27/23 1949   Thu Apr 27, 2023 1939 Discussed with Dr. Barboza, he accepted the patient for admission [CS]      ED Course User Index  [CS] Pramod Marie Jr., HUGH       Lab Results (last 24 hours)     Procedure Component Value Units Date/Time    Sedimentation Rate [[id number]]  (Abnormal) Collected: 04/27/23 1624    Specimen: Blood Updated: 04/27/23 1829     Sed Rate 34 mm/hr     CBC & Differential [[id number]]  (Abnormal) Collected: 04/27/23 1627    Specimen: Blood Updated: 04/27/23 1637    Narrative:      The following orders were created for panel order CBC & Differential.  Procedure                               Abnormality         Status                     ---------                               -----------         ------                     CBC Auto Differential[[id number]]        Abnormal            Final result                 Please view results for these tests on the individual orders.    Comprehensive Metabolic Panel [[id number]]  (Abnormal) Collected: 04/27/23 1627    Specimen: Blood Updated: 04/27/23 1655     Glucose 105 mg/dL      BUN 25 mg/dL      Creatinine 1.16 mg/dL       Sodium 132 mmol/L      Potassium 3.3 mmol/L      Chloride 99 mmol/L      CO2 17.9 mmol/L      Calcium 9.4 mg/dL      Total Protein 6.5 g/dL      Albumin 3.4 g/dL      ALT (SGPT) 11 U/L      AST (SGOT) 26 U/L      Alkaline Phosphatase 51 U/L      Total Bilirubin 0.5 mg/dL      Globulin 3.1 gm/dL      A/G Ratio 1.1 g/dL      BUN/Creatinine Ratio 21.6     Anion Gap 15.1 mmol/L      eGFR 52.1 mL/min/1.73     Narrative:      GFR Normal >60  Chronic Kidney Disease <60  Kidney Failure <15      CBC Auto Differential [[id number]]  (Abnormal) Collected: 04/27/23 1627    Specimen: Blood Updated: 04/27/23 1637     WBC 13.86 10*3/mm3      RBC 4.06 10*6/mm3      Hemoglobin 12.0 g/dL      Hematocrit 35.6 %      MCV 87.7 fL      MCH 29.6 pg      MCHC 33.7 g/dL      RDW 14.0 %      RDW-SD 45.0 fl      MPV 11.5 fL      Platelets 220 10*3/mm3      Neutrophil % 78.7 %      Lymphocyte % 12.0 %      Monocyte % 7.9 %      Eosinophil % 0.5 %      Basophil % 0.3 %      Immature Grans % 0.6 %      Neutrophils, Absolute 10.91 10*3/mm3      Lymphocytes, Absolute 1.67 10*3/mm3      Monocytes, Absolute 1.09 10*3/mm3      Eosinophils, Absolute 0.07 10*3/mm3      Basophils, Absolute 0.04 10*3/mm3      Immature Grans, Absolute 0.08 10*3/mm3      nRBC 0.0 /100 WBC     Procalcitonin [[id number]]  (Abnormal) Collected: 04/27/23 1627    Specimen: Blood Updated: 04/27/23 1931     Procalcitonin 1.19 ng/mL     Narrative:      As a Marker for Sepsis (Non-Neonates):    1. <0.5 ng/mL represents a low risk of severe sepsis and/or septic shock.  2. >2 ng/mL represents a high risk of severe sepsis and/or septic shock.    As a Marker for Lower Respiratory Tract Infections that require antibiotic therapy:    PCT on Admission    Antibiotic Therapy       6-12 Hrs later    >0.5                Strongly Recommended  >0.25 - <0.5        Recommended   0.1 - 0.25          Discouraged              Remeasure/reassess PCT  <0.1                Strongly Discouraged      "Remeasure/reassess PCT    As 28 day mortality risk marker: \"Change in Procalcitonin Result\" (>80% or <=80%) if Day 0 (or Day 1) and Day 4 values are available. Refer to http://[email or web address]-pct-calculator.com    Change in PCT <=80%  A decrease of PCT levels below or equal to 80% defines a positive change in PCT test result representing a higher risk for 28-day all-cause mortality of patients diagnosed with severe sepsis for septic shock.    Change in PCT >80%  A decrease of PCT levels of more than 80% defines a negative change in PCT result representing a lower risk for 28-day all-cause mortality of patients diagnosed with severe sepsis or septic shock.       C-reactive Protein [[id number]]  (Abnormal) Collected: 04/27/23 1627    Specimen: Blood Updated: 04/27/23 1937     C-Reactive Protein 29.82 mg/dL     Lactic Acid, Plasma [[id number]]  (Normal) Collected: 04/27/23 1826    Specimen: Blood from Arm, Left Updated: 04/27/23 1901     Lactate 1.4 mmol/L     Blood Culture With TYRON - Blood, Arm, Left [[id number]] Collected: 04/27/23 1826    Specimen: Blood from Arm, Left Updated: 04/27/23 1843    Blood Culture With TYRON - Blood, Arm, Left [[id number]] Collected: 04/27/23 1834    Specimen: Blood from Arm, Left Updated: 04/27/23 1843           CT Abdomen Pelvis With Contrast    Result Date: 4/27/2023  PROCEDURE: CT ABDOMEN PELVIS W CONTRAST-  HISTORY: suprapubic/pelvic abscess  Comparison: September 13, 2016  FINDINGS: Axial CT images of the abdomen and pelvis were obtained with IV contrast only. Coronal reformatted images were also obtained. This study was performed with techniques to keep radiation doses as low as reasonably achievable, (ALARA). Individualized dose reduction techniques using automated exposure control or adjustment of mA and/or kV according to the patient size were employed.  There is mild emphysema in the lung bases. The liver has an unremarkable appearance, without evidence of mass. Postoperative changes are " seen from cholecystectomy. There is no evidence of biliary ductal dilatation. The pancreas appears normal. The spleen size is within normal limits. There is no evidence of renal mass or hydronephrosis. There is no evidence of adenopathy. No abnormal fluid collection is seen. No localized inflammatory process is identified. Severe vascular calcifications are noted.  Images of the pelvis reveal multiple fluid-filled small bowel loops in a nonspecific pattern. Multiple diverticula are noted in the sigmoid colon. The appendix is unremarkable. Stranding is noted of the suprapubic anterior subcutaneous fat system with edema or cellulitis. No well-defined focal fluid collection is seen to suggest an abscess. Several borderline sized inguinal nodes are seen.       Impression: Suprapubic subcutaneous fat stranding consistent with edema or cellulitis. No well-defined abscess is identified.  Small amount of pelvic free fluid likely reactive.  Multiple fluid-filled small bowel loops in a nonspecific pattern but could represent an enteritis.    This report was signed and finalized on 4/27/2023 7:23 PM by Pramod Baron MD.         Medical Decision Making  67-year-old female presents with cellulitis in her suprapubic region, she had a incision and drainage performed 2 days earlier and was sent home on antibiotics, returns with worsening symptoms.  Differential would include worsening cellulitis, abscess, edema.  I evaluate the patient the bedside, she had a large suprapubic reddened swollen area, an IV was established, blood cultures were drawn, and she was placed on broad-spectrum antibiotics.  The patient was also given IV pain medicine.  A CT scan was performed that showed edema versus cellulitis, she had an elevated white count and elevated procalcitonin.  I reviewed and interpreted all labs myself and discussed with the patient and family at bedside, also discussed the results of the scans with the patient and family at  bedside.  I reviewed and summarized previous medical records including her last ED visit, labs, and any radiology exams.  Upon reevaluation, her pain is improved she remained stable.  I discussed the case with the hospitalist Dr. Barboza, and recommended admission due to failed oral antibiotics.  She will be admitted for IV antibiotics and further evaluation    Cellulitis of suprapubic region: acute illness or injury  Amount and/or Complexity of Data Reviewed  Labs: ordered. Decision-making details documented in ED Course.  Radiology: ordered. Decision-making details documented in ED Course.  ECG/medicine tests:  Decision-making details documented in ED Course.      Risk  Prescription drug management.  Decision regarding hospitalization.            Final diagnoses:   Cellulitis of suprapubic region        Pramod Marie Jr., PA-C  04/27/23 1949

## 2023-04-28 LAB
ALBUMIN SERPL-MCNC: 2.8 G/DL (ref 3.5–5.2)
ALBUMIN/GLOB SERPL: 1.2 G/DL
ALP SERPL-CCNC: 43 U/L (ref 39–117)
ALT SERPL W P-5'-P-CCNC: 9 U/L (ref 1–33)
ANION GAP SERPL CALCULATED.3IONS-SCNC: 8.2 MMOL/L (ref 5–15)
AST SERPL-CCNC: 23 U/L (ref 1–32)
BACTERIA SPEC AEROBE CULT: ABNORMAL
BILIRUB SERPL-MCNC: 0.4 MG/DL (ref 0–1.2)
BUN SERPL-MCNC: 15 MG/DL (ref 8–23)
BUN/CREAT SERPL: 21.7 (ref 7–25)
CALCIUM SPEC-SCNC: 8.3 MG/DL (ref 8.6–10.5)
CHLORIDE SERPL-SCNC: 106 MMOL/L (ref 98–107)
CO2 SERPL-SCNC: 21.8 MMOL/L (ref 22–29)
CREAT SERPL-MCNC: 0.69 MG/DL (ref 0.57–1)
DEPRECATED RDW RBC AUTO: 48.3 FL (ref 37–54)
EGFRCR SERPLBLD CKD-EPI 2021: 95.9 ML/MIN/1.73
ERYTHROCYTE [DISTWIDTH] IN BLOOD BY AUTOMATED COUNT: 14.6 % (ref 12.3–15.4)
GLOBULIN UR ELPH-MCNC: 2.4 GM/DL
GLUCOSE SERPL-MCNC: 87 MG/DL (ref 65–99)
HBA1C MFR BLD: 5.1 % (ref 4.8–5.6)
HCT VFR BLD AUTO: 30.9 % (ref 34–46.6)
HGB BLD-MCNC: 10.1 G/DL (ref 12–15.9)
MCH RBC QN AUTO: 29.5 PG (ref 26.6–33)
MCHC RBC AUTO-ENTMCNC: 32.7 G/DL (ref 31.5–35.7)
MCV RBC AUTO: 90.4 FL (ref 79–97)
MRSA DNA SPEC QL NAA+PROBE: NORMAL
PLATELET # BLD AUTO: 191 10*3/MM3 (ref 140–450)
PMV BLD AUTO: 12.1 FL (ref 6–12)
POTASSIUM SERPL-SCNC: 3.2 MMOL/L (ref 3.5–5.2)
PROT SERPL-MCNC: 5.2 G/DL (ref 6–8.5)
RBC # BLD AUTO: 3.42 10*6/MM3 (ref 3.77–5.28)
SODIUM SERPL-SCNC: 136 MMOL/L (ref 136–145)
TSH SERPL DL<=0.05 MIU/L-ACNC: 2.68 UIU/ML (ref 0.27–4.2)
VANCOMYCIN SERPL-MCNC: 5.8 MCG/ML (ref 5–40)
WBC NRBC COR # BLD: 6.9 10*3/MM3 (ref 3.4–10.8)

## 2023-04-28 PROCEDURE — 84443 ASSAY THYROID STIM HORMONE: CPT | Performed by: INTERNAL MEDICINE

## 2023-04-28 PROCEDURE — 0 CEFAZOLIN SODIUM-DEXTROSE 1-4 GM-%(50ML) RECONSTITUTED SOLUTION: Performed by: INTERNAL MEDICINE

## 2023-04-28 PROCEDURE — 87641 MR-STAPH DNA AMP PROBE: CPT | Performed by: INTERNAL MEDICINE

## 2023-04-28 PROCEDURE — 83036 HEMOGLOBIN GLYCOSYLATED A1C: CPT | Performed by: INTERNAL MEDICINE

## 2023-04-28 PROCEDURE — 85027 COMPLETE CBC AUTOMATED: CPT | Performed by: INTERNAL MEDICINE

## 2023-04-28 PROCEDURE — 80202 ASSAY OF VANCOMYCIN: CPT | Performed by: INTERNAL MEDICINE

## 2023-04-28 PROCEDURE — 80053 COMPREHEN METABOLIC PANEL: CPT | Performed by: INTERNAL MEDICINE

## 2023-04-28 PROCEDURE — 25010000002 VANCOMYCIN 1 G RECONSTITUTED SOLUTION

## 2023-04-28 RX ORDER — IBUPROFEN 200 MG
600 TABLET ORAL EVERY 8 HOURS SCHEDULED
Status: DISCONTINUED | OUTPATIENT
Start: 2023-04-28 | End: 2023-04-29 | Stop reason: HOSPADM

## 2023-04-28 RX ORDER — QUETIAPINE FUMARATE 50 MG/1
50 TABLET, FILM COATED ORAL NIGHTLY
COMMUNITY

## 2023-04-28 RX ORDER — LEVOTHYROXINE SODIUM 0.07 MG/1
75 TABLET ORAL DAILY
COMMUNITY

## 2023-04-28 RX ORDER — IBUPROFEN 600 MG/1
600 TABLET ORAL EVERY 8 HOURS PRN
COMMUNITY

## 2023-04-28 RX ORDER — DOCUSATE SODIUM, 50 MG SENNOSIDES, 8.6 MG 8.6; 5 1/1; 1/1
1 CAPSULE, GELATIN COATED ORAL DAILY
COMMUNITY

## 2023-04-28 RX ORDER — TIZANIDINE 4 MG/1
8 TABLET ORAL NIGHTLY PRN
COMMUNITY

## 2023-04-28 RX ORDER — POTASSIUM CHLORIDE 1.5 G/1.77G
20 POWDER, FOR SOLUTION ORAL 2 TIMES DAILY
Status: DISCONTINUED | OUTPATIENT
Start: 2023-04-28 | End: 2023-04-29 | Stop reason: HOSPADM

## 2023-04-28 RX ORDER — CEFAZOLIN SODIUM 1 G/50ML
1 SOLUTION INTRAVENOUS EVERY 6 HOURS
Status: DISCONTINUED | OUTPATIENT
Start: 2023-04-28 | End: 2023-04-29 | Stop reason: HOSPADM

## 2023-04-28 RX ORDER — HYDROCODONE BITARTRATE AND ACETAMINOPHEN 5; 325 MG/1; MG/1
1 TABLET ORAL EVERY 6 HOURS PRN
Status: DISCONTINUED | OUTPATIENT
Start: 2023-04-28 | End: 2023-04-29 | Stop reason: HOSPADM

## 2023-04-28 RX ADMIN — PANTOPRAZOLE SODIUM 40 MG: 40 TABLET, DELAYED RELEASE ORAL at 06:33

## 2023-04-28 RX ADMIN — FENOFIBRATE 145 MG: 145 TABLET, FILM COATED ORAL at 10:35

## 2023-04-28 RX ADMIN — DULOXETINE 30 MG: 30 CAPSULE, DELAYED RELEASE ORAL at 10:35

## 2023-04-28 RX ADMIN — DOCUSATE SODIUM 100 MG: 100 CAPSULE, LIQUID FILLED ORAL at 20:54

## 2023-04-28 RX ADMIN — IBUPROFEN 600 MG: 200 TABLET, FILM COATED ORAL at 21:00

## 2023-04-28 RX ADMIN — HYDROCODONE BITARTRATE AND ACETAMINOPHEN 1 TABLET: 5; 325 TABLET ORAL at 10:35

## 2023-04-28 RX ADMIN — ZOLPIDEM TARTRATE 10 MG: 5 TABLET ORAL at 20:54

## 2023-04-28 RX ADMIN — HYDROCODONE BITARTRATE AND ACETAMINOPHEN 1 TABLET: 5; 325 TABLET ORAL at 16:36

## 2023-04-28 RX ADMIN — GABAPENTIN 400 MG: 400 CAPSULE ORAL at 20:54

## 2023-04-28 RX ADMIN — CEFAZOLIN SODIUM 1 G: 1 SOLUTION INTRAVENOUS at 20:53

## 2023-04-28 RX ADMIN — SODIUM CHLORIDE 1000 MG: 900 INJECTION, SOLUTION INTRAVENOUS at 10:40

## 2023-04-28 RX ADMIN — POTASSIUM CHLORIDE 20 MEQ: 1.5 POWDER, FOR SOLUTION ORAL at 10:35

## 2023-04-28 RX ADMIN — PRAVASTATIN SODIUM 40 MG: 20 TABLET ORAL at 20:54

## 2023-04-28 RX ADMIN — POTASSIUM CHLORIDE 20 MEQ: 1.5 POWDER, FOR SOLUTION ORAL at 20:54

## 2023-04-28 RX ADMIN — DOCUSATE SODIUM 100 MG: 100 CAPSULE, LIQUID FILLED ORAL at 10:35

## 2023-04-28 NOTE — PAYOR COMM NOTE
"To:  Crozier  From: Denia Bennett RN  Phone: [phone]  Fax: [phone]  NPI: [id number]  TIN: [id number]  Member ID: [id number]   MRN: [id number]    Leann Antonio (67 y.o. Female)       Date of Birth   1955    Social Security Number       Address   [address] [place] [address]    Home Phone   [phone]    MRN   [id number]       Pentecostalism   Islam    Marital Status                               Admission Date   4/27/23    Admission Type   Emergency    Admitting Provider   Ulises Coombs MD    Attending Provider   Ulises Coombs MD    Department, Room/Bed   [facility] TELEMETRY 4, 423/1       Discharge Date       Discharge Disposition       Discharge Destination                                 Attending Provider: Ulises Coombs MD    Allergies: Penicillins    Isolation: Contact   Infection: None   Code Status: CPR    Ht: 160 cm (63\")   Wt: 76 kg (167 lb 8.8 oz)    Admission Cmt: None   Principal Problem: Cellulitis of suprapubic region [L03.319]                   Active Insurance as of 4/27/2023       Primary Coverage       Payor Plan Insurance Group Employer/Plan Group    ANTHEM MEDICARE REPLACEMENT ANTHEM MEDICARE ADVANTAGE [id number]       Payor Plan Address Payor Plan Phone Number Payor Plan Fax Number Effective Dates    [address] [phone]  1/1/2021 - None Entered    [facility] [phone]         Subscriber Name Subscriber Birth Date Member ID       LEANN ANTONIO 1955 [id number]               Secondary Coverage       Payor Plan Insurance Group Employer/Plan Group    [place]        Payor Plan Address Payor Plan Phone Number Payor Plan Fax Number Effective Dates    [address]   1/1/2014 - None Entered    [facility] [phone]         Subscriber Name Subscriber Birth Date Member ID       LEANN ANTONIO 1955 [id number]                     Emergency Contacts       Contact " Person (Rel.) Home Phone Work Phone Mobile Phone    Zabrina Lambert (Daughter) [phone] -- --                 History & Physical        Ulises Coombs MD at 23 [phone]              [facility]   HISTORY AND PHYSICAL      Name:  Leann Antonio   Age:  67 y.o.  Sex:  female  :  1955  MRN:  [id number]   Visit Number:  [id number]  Admission Date:  2023  Date Of Service:  23  Primary Care Physician:  Ulises Coombs MD     Admitting diagnosis:      Cellulitis of suprapubic region    Depression    Hyperlipidemia    Hypothyroidism (acquired)        History Of Presenting Illness:      67-year-old patient with history of depression, hypothyroidism, hyperlipidemia, GERD, neuropathy, who came on  to the ER because of pain swelling and abscess over the suprapubic region.  She had I&D done and was discharged on oral Bactrim but got worse so she came to the ER again.  Upon evaluation she was noted to have cellulitis and has failed outpatient antibiotics so patient has been admitted for further IV antibiotic and under observation.  She states that she has been having pain and has been uncomfortable with walking.  There is no fever or chills.  There has been some serous drainage from the incision which has been done  Labs have been reviewed with the scan results as below    Review Of Systems:     The following systems were reviewed and negative;  constitution, eyes, ENT, respiratory, cardiovascular, gastrointestinal, genitourinary, musculoskeletal, neurological and behavioral/psych,  Skin except as above.     Past Medical History:    Past Medical History:   Diagnosis Date    Depression     GERD (gastroesophageal reflux disease)     Hyperlipidemia     Panic attacks        Past Surgical history:    Past Surgical History:   Procedure Laterality Date    CHOLECYSTECTOMY      HYSTERECTOMY         Social History:    Social History     Socioeconomic History    Marital  status:    Tobacco Use    Smoking status: Former     Packs/day: 0.50     Types: Cigarettes    Smokeless tobacco: Never   Vaping Use    Vaping Use: Every day   Substance and Sexual Activity    Alcohol use: No    Drug use: No    Sexual activity: Defer       Family History:    Family History   Problem Relation Age of Onset    Cirrhosis Mother     Cirrhosis Brother     Breast cancer Sister     Colon cancer Neg Hx          Allergies:      Penicillins    Home Medications:    Prior to Admission Medications       Prescriptions Last Dose Informant Patient Reported? Taking?    busPIRone (BUSPAR) 15 MG tablet 4/27/2023  Yes Yes    TAKE 1 TABLET TWICE A DAY    cetirizine (zyrTEC) 10 MG tablet 4/27/2023  Yes Yes    Take 1 tablet by mouth Daily.    Cholecalciferol (VITAMIN D3) 92427 UNITS capsule   Yes No    TAKE 1 CAPSULE BY MOUTH ONCE WEEKLY    docusate sodium (COLACE) 100 MG capsule Past Week  Yes Yes    TAKE 2 CAPSULES BY MOUTH TWICE A DAY AS NEEDED FOR CONSTIPATION    DULoxetine (CYMBALTA) 30 MG capsule 4/27/2023  Yes Yes    Take 1 capsule by mouth Daily.    fenofibrate 160 MG tablet 4/26/2023  Yes Yes    TAKE 1 TABLET BY MOUTH DAILY    ferrous sulfate 325 (65 FE) MG tablet   Yes No    TAKE 1 TABLET BY MOUTH TWICE DAILY    gabapentin (NEURONTIN) 400 MG capsule 4/26/2023  Yes Yes    Take 1 capsule by mouth Every Night.    metoclopramide (REGLAN) 5 MG tablet 4/27/2023  No Yes    Take 0.5 tablets by mouth 2 (Two) Times a Day Before Meals.    multivitamin (THERAGRAN) tablet tablet   Yes No    TAKE 1 TABLET BY MOUTH ONCE DAILY    pantoprazole (PROTONIX) 40 MG EC tablet Unknown  No No    Take 1 tablet in the am 30 minutes before breakfast.    polyethylene glycol (MIRALAX) powder   Yes No    TAKE 17G IN 8OZ OF WATER EVERY DAY AS NEEDED    pravastatin (PRAVACHOL) 40 MG tablet 4/26/2023  Yes Yes    Take 1 tablet by mouth Every Night.    QUEtiapine (SEROquel) 300 MG tablet   Yes No    TAKE 1 TABLET BY MOUTH AT BEDTIME     rosuvastatin (CRESTOR) 20 MG tablet   Yes No    TAKE 1 TABLET BY MOUTH DAILY    sertraline (ZOLOFT) 100 MG tablet   Yes No    TAKE 1 TABLET TWICE A DAY    simethicone (MYLICON) 80 MG chewable tablet Past Week  Yes Yes    Chew 1 tablet Every 6 (Six) Hours As Needed for Flatulence.    sulfamethoxazole-trimethoprim (BACTRIM DS,SEPTRA DS) 800-160 MG per tablet 4/27/2023  No Yes    Take 1 tablet by mouth 2 (Two) Times a Day for 10 days.    traZODone (DESYREL) 50 MG tablet 4/26/2023  Yes Yes    Take 2 tablets by mouth Every Night.    zolpidem (AMBIEN) 10 MG tablet 4/26/2023  Yes Yes    Take 1 tablet by mouth At Night As Needed for Sleep.                   Vital Signs:    Temp:  [97.3 °F (36.3 °C)] 97.3 °F (36.3 °C)  Heart Rate:  [65-86] 65  Resp:  [16-18] 16  BP: (115-144)/(53-63) 144/53        04/27/23  1607   Weight: 72.6 kg (160 lb)       Body mass index is 28.34 kg/m².    Physical Exam:      General Appearance:    Alert and cooperative,  And lying comfortably in bed   Head:    Atraumatic and normocephalic, without obvious abnormality.   Eyes:            PERRLA, conjunctivae and sclerae normal, no Icterus. No pallor. Extraocular movements are within normal limits.   Ears:    Ears appear intact with no abnormalities noted.   Throat:   No oral lesions, no thrush, oral mucosa moist.   Neck:   Supple, trachea midline, no thyromegaly, no carotid bruit, no lymphadenopathy   Lungs:    Breath sounds heard bilaterally equally.  No crackles or wheezing. No Pleural rub or bronchial breathing.       Heart:    Normal S1 and S2, no murmur, no gallop, no rub. No JVD   Abdomen:     Normal bowel sounds, no masses, no organomegaly. Soft   nontender, nondistended, no guarding, no rebound    tenderness   Extremities:   Moves all extremities well, no edema, no cyanosis, no          clubbing   Skin:  There is edema with induration around 4 to 5 inches surrounding the incision over the suprapubic region with redness tenderness and swelling  "  Neurologic:   Cranial nerves 2 - 12 grossly intact, sensation intact, Motor power is normal and equal bilaterally.       EKG:     not done    Labs:    Lab Results (last 24 hours)       Procedure Component Value Units Date/Time    C-reactive Protein [[id number]]  (Abnormal) Collected: 04/27/23 1627    Specimen: Blood Updated: 04/27/23 1937     C-Reactive Protein 29.82 mg/dL     Procalcitonin [[id number]]  (Abnormal) Collected: 04/27/23 1627    Specimen: Blood Updated: 04/27/23 1931     Procalcitonin 1.19 ng/mL     Narrative:      As a Marker for Sepsis (Non-Neonates):    1. <0.5 ng/mL represents a low risk of severe sepsis and/or septic shock.  2. >2 ng/mL represents a high risk of severe sepsis and/or septic shock.    As a Marker for Lower Respiratory Tract Infections that require antibiotic therapy:    PCT on Admission    Antibiotic Therapy       6-12 Hrs later    >0.5                Strongly Recommended  >0.25 - <0.5        Recommended   0.1 - 0.25          Discouraged              Remeasure/reassess PCT  <0.1                Strongly Discouraged     Remeasure/reassess PCT    As 28 day mortality risk marker: \"Change in Procalcitonin Result\" (>80% or <=80%) if Day 0 (or Day 1) and Day 4 values are available. Refer to http://www.[employer]-pct-calculator.com    Change in PCT <=80%  A decrease of PCT levels below or equal to 80% defines a positive change in PCT test result representing a higher risk for 28-day all-cause mortality of patients diagnosed with severe sepsis for septic shock.    Change in PCT >80%  A decrease of PCT levels of more than 80% defines a negative change in PCT result representing a lower risk for 28-day all-cause mortality of patients diagnosed with severe sepsis or septic shock.       Lactic Acid, Plasma [[id number]]  (Normal) Collected: 04/27/23 1826    Specimen: Blood from Arm, Left Updated: 04/27/23 1901     Lactate 1.4 mmol/L     Blood Culture With TYRON - Blood, Arm, Left [[id number]] Collected: " 04/27/23 1826    Specimen: Blood from Arm, Left Updated: 04/27/23 1843    Blood Culture With TYRON - Blood, Arm, Left [[id number]] Collected: 04/27/23 1834    Specimen: Blood from Arm, Left Updated: 04/27/23 1843    Sedimentation Rate [[id number]]  (Abnormal) Collected: 04/27/23 1624    Specimen: Blood Updated: 04/27/23 1829     Sed Rate 34 mm/hr     [facility] Draw [[id number]] Collected: 04/27/23 1627    Specimen: Blood Updated: 04/27/23 1730    Narrative:      The following orders were created for panel order [facility] Draw.  Procedure                               Abnormality         Status                     ---------                               -----------         ------                     Green Top (Gel)[[id number]]                                  Final result               Lavender Top[[id number]]                                     Final result               Gold Top - SST[[id number]]                                   Final result               Light Blue Top[[id number]]                                   Final result                 Please view results for these tests on the individual orders.    Light Blue Top [[id number]] Collected: 04/27/23 1627    Specimen: Blood Updated: 04/27/23 1730     Extra Tube Hold for add-ons.     Comment: Auto resulted       Lavender Top [[id number]] Collected: 04/27/23 1627    Specimen: Blood Updated: 04/27/23 1730     Extra Tube hold for add-on     Comment: Auto resulted       Gold Top - SST [[id number]] Collected: 04/27/23 1627    Specimen: Blood Updated: 04/27/23 1730     Extra Tube Hold for add-ons.     Comment: Auto resulted.       Green Top (Gel) [[id number]] Collected: 04/27/23 1627    Specimen: Blood Updated: 04/27/23 1730     Extra Tube Hold for add-ons.     Comment: Auto resulted.       Comprehensive Metabolic Panel [[id number]]  (Abnormal) Collected: 04/27/23 1627    Specimen: Blood Updated: 04/27/23 1655     Glucose 105 mg/dL      BUN 25 mg/dL      Creatinine 1.16 mg/dL       Sodium 132 mmol/L      Potassium 3.3 mmol/L      Chloride 99 mmol/L      CO2 17.9 mmol/L      Calcium 9.4 mg/dL      Total Protein 6.5 g/dL      Albumin 3.4 g/dL      ALT (SGPT) 11 U/L      AST (SGOT) 26 U/L      Alkaline Phosphatase 51 U/L      Total Bilirubin 0.5 mg/dL      Globulin 3.1 gm/dL      A/G Ratio 1.1 g/dL      BUN/Creatinine Ratio 21.6     Anion Gap 15.1 mmol/L      eGFR 52.1 mL/min/1.73     Narrative:      GFR Normal >60  Chronic Kidney Disease <60  Kidney Failure <15      CBC & Differential [[id number]]  (Abnormal) Collected: 04/27/23 1627    Specimen: Blood Updated: 04/27/23 1637    Narrative:      The following orders were created for panel order CBC & Differential.  Procedure                               Abnormality         Status                     ---------                               -----------         ------                     CBC Auto Differential[[id number]]        Abnormal            Final result                 Please view results for these tests on the individual orders.    CBC Auto Differential [[id number]]  (Abnormal) Collected: 04/27/23 1627    Specimen: Blood Updated: 04/27/23 1637     WBC 13.86 10*3/mm3      RBC 4.06 10*6/mm3      Hemoglobin 12.0 g/dL      Hematocrit 35.6 %      MCV 87.7 fL      MCH 29.6 pg      MCHC 33.7 g/dL      RDW 14.0 %      RDW-SD 45.0 fl      MPV 11.5 fL      Platelets 220 10*3/mm3      Neutrophil % 78.7 %      Lymphocyte % 12.0 %      Monocyte % 7.9 %      Eosinophil % 0.5 %      Basophil % 0.3 %      Immature Grans % 0.6 %      Neutrophils, Absolute 10.91 10*3/mm3      Lymphocytes, Absolute 1.67 10*3/mm3      Monocytes, Absolute 1.09 10*3/mm3      Eosinophils, Absolute 0.07 10*3/mm3      Basophils, Absolute 0.04 10*3/mm3      Immature Grans, Absolute 0.08 10*3/mm3      nRBC 0.0 /100 WBC             Radiology:    Imaging Results (Last 72 Hours)       Procedure Component Value Units Date/Time    CT Abdomen Pelvis With Contrast [[id number]] Collected:  04/27/23 1920     Updated: 04/27/23 1925    Narrative:      PROCEDURE: CT ABDOMEN PELVIS W CONTRAST-     HISTORY: suprapubic/pelvic abscess     Comparison: September 13, 2016     FINDINGS: Axial CT images of the abdomen and pelvis were obtained with  IV contrast only. Coronal reformatted images were also obtained. This  study was performed with techniques to keep radiation doses as low as  reasonably achievable, (ALARA). Individualized dose reduction techniques  using automated exposure control or adjustment of mA and/or kV according  to the patient size were employed.     There is mild emphysema in the lung bases. The liver has an unremarkable  appearance, without evidence of mass. Postoperative changes are seen  from cholecystectomy. There is no evidence of biliary ductal dilatation.  The pancreas appears normal. The spleen size is within normal limits.  There is no evidence of renal mass or hydronephrosis. There is no  evidence of adenopathy. No abnormal fluid collection is seen. No  localized inflammatory process is identified. Severe vascular  calcifications are noted.     Images of the pelvis reveal multiple fluid-filled small bowel loops in a  nonspecific pattern. Multiple diverticula are noted in the sigmoid  colon. The appendix is unremarkable. Stranding is noted of the  suprapubic anterior subcutaneous fat system with edema or cellulitis. No  well-defined focal fluid collection is seen to suggest an abscess.  Several borderline sized inguinal nodes are seen.           Impression:      Suprapubic subcutaneous fat stranding consistent with edema  or cellulitis. No well-defined abscess is identified.     Small amount of pelvic free fluid likely reactive.     Multiple fluid-filled small bowel loops in a nonspecific pattern but  could represent an enteritis.           This report was signed and finalized on 4/27/2023 7:23 PM by Pramod Baron MD.            Assessment:    Assessment & Plan       Cellulitis of  suprapubic region    Depression    Hyperlipidemia    Hypothyroidism (acquired)        Plan:     1.  Cellulitis of the suprapubic region-s/p I&D her white count is around 14,000 with clinical signs symptoms for typical bacterial cellulitis.  We will start with aztreonam as well as vancomycin and see if he can get cultures and treat accordingly    Continue rest of the medication for orders and will add medication for pain if she is having severe pain over the surrounding area  Plan of care has been addressed with the patient and see rest as per jenni Coombs MD  04/27/23  22:08 EDT    Please note that portions of this note were completed with a voice recognition program.    Electronically signed by Ulises Coombs MD at 04/28/23 0920       Vital Signs (last day)       Date/Time Temp Temp src Pulse Resp BP Patient Position SpO2    04/28/23 1115 97.3 (36.3) Oral 73 19 -- -- 97    04/28/23 0712 97.5 (36.4) Oral 74 18 126/54 Lying 96    04/28/23 0356 97.3 (36.3) Oral 74 18 119/49 Lying 93    04/27/23 2358 98.1 (36.7) Oral 69 16 94/58 Lying 94    04/27/23 2036 97.3 (36.3) Axillary 65 16 144/53 Lying 100    04/27/23 1607 97.3 (36.3) Oral 86 18 115/63 Sitting 98          Current Facility-Administered Medications   Medication Dose Route Frequency Provider Last Rate Last Admin    aztreonam (AZACTAM) 2 g/100 mL 0.9% NS (mbp)  2 g Intravenous Q8H Ulises Coombs MD   Stopped at 04/28/23 0625    docusate sodium (COLACE) capsule 100 mg  100 mg Oral BID Ulises Coombs MD   100 mg at 04/28/23 1035    DULoxetine (CYMBALTA) DR capsule 30 mg  30 mg Oral Daily Ulises Coombs MD   30 mg at 04/28/23 1035    fenofibrate (TRICOR) tablet 145 mg  145 mg Oral Daily Ulises Coombs MD   145 mg at 04/28/23 1035    gabapentin (NEURONTIN) capsule 400 mg  400 mg Oral Nightly Ulises Coombs MD   400 mg at 04/27/23 2231    HYDROcodone-acetaminophen (NORCO) 5-325 MG per tablet 1 tablet  1 tablet Oral Q6H PRN Ulises Coombs MD    1 tablet at 04/28/23 1035    ibuprofen (ADVIL,MOTRIN) tablet 600 mg  600 mg Oral Q8H Ulises Coombs MD        pantoprazole (PROTONIX) EC tablet 40 mg  40 mg Oral Q AM Ulises Coombs MD   40 mg at 04/28/23 0633    Pharmacy to dose vancomycin   Does not apply Continuous PRN Ulises Coombs MD        potassium chloride (KLOR-CON) packet 20 mEq  20 mEq Oral BID Ulises Coombs MD   20 mEq at 04/28/23 1035    pravastatin (PRAVACHOL) tablet 40 mg  40 mg Oral Nightly Ulises Coombs MD   40 mg at 04/27/23 2231    simethicone (MYLICON) chewable tablet 80 mg  80 mg Oral Q6H PRN Ulises Coombs MD        sodium chloride 0.9 % flush 10 mL  10 mL Intravenous PRN Ulises Coombs MD        vancomycin 1 g/250 mL 0.9% NS (vial-mate)  1,000 mg Intravenous Q12H Luis E Frazier RPH   1,000 mg at 04/28/23 1040    zolpidem (AMBIEN) tablet 10 mg  10 mg Oral Nightly PRN Ulises Coombs MD   10 mg at 04/27/23 2231     Lab Results (last 24 hours)       Procedure Component Value Units Date/Time    MRSA Screen, PCR (Inpatient) - Swab, Nares [[id number]] Collected: 04/28/23 0637    Specimen: Swab from Nares Updated: 04/28/23 0836    Blood Culture With TYRON - Blood, Arm, Left [[id number]]  (Normal) Collected: 04/27/23 1826    Specimen: Blood from Arm, Left Updated: 04/28/23 0645     Blood Culture No growth at less than 24 hours    Blood Culture With TYRON - Blood, Arm, Left [[id number]]  (Normal) Collected: 04/27/23 1834    Specimen: Blood from Arm, Left Updated: 04/28/23 0645     Blood Culture No growth at less than 24 hours    TSH [[id number]]  (Normal) Collected: 04/28/23 0457    Specimen: Blood Updated: 04/28/23 0609     TSH 2.680 uIU/mL     Hemoglobin A1c [[id number]]  (Normal) Collected: 04/28/23 0457    Specimen: Blood Updated: 04/28/23 0606     Hemoglobin A1C 5.10 %     Narrative:      Hemoglobin A1C Ranges:    Increased Risk for Diabetes  5.7% to 6.4%  Diabetes                     >= 6.5%  Diabetic Goal                < 7.0%     Vancomycin, Random [[id number]]  (Normal) Collected: 04/28/23 0457    Specimen: Blood Updated: 04/28/23 0558     Vancomycin Random 5.80 mcg/mL     Narrative:      Therapeutic Ranges for Vancomycin    Vancomycin Random   5.0-40.0 mcg/mL  Vancomycin Trough   5.0-20.0 mcg/mL  Vancomycin Peak     20.0-40.0 mcg/mL    Comprehensive Metabolic Panel [[id number]]  (Abnormal) Collected: 04/28/23 0457    Specimen: Blood Updated: 04/28/23 0558     Glucose 87 mg/dL      BUN 15 mg/dL      Creatinine 0.69 mg/dL      Sodium 136 mmol/L      Potassium 3.2 mmol/L      Chloride 106 mmol/L      CO2 21.8 mmol/L      Calcium 8.3 mg/dL      Total Protein 5.2 g/dL      Albumin 2.8 g/dL      ALT (SGPT) 9 U/L      AST (SGOT) 23 U/L      Alkaline Phosphatase 43 U/L      Total Bilirubin 0.4 mg/dL      Globulin 2.4 gm/dL      A/G Ratio 1.2 g/dL      BUN/Creatinine Ratio 21.7     Anion Gap 8.2 mmol/L      eGFR 95.9 mL/min/1.73     Narrative:      GFR Normal >60  Chronic Kidney Disease <60  Kidney Failure <15      CBC (No Diff) [[id number]]  (Abnormal) Collected: 04/28/23 0457    Specimen: Blood Updated: 04/28/23 0555     WBC 6.90 10*3/mm3      RBC 3.42 10*6/mm3      Hemoglobin 10.1 g/dL      Hematocrit 30.9 %      MCV 90.4 fL      MCH 29.5 pg      MCHC 32.7 g/dL      RDW 14.6 %      RDW-SD 48.3 fl      MPV 12.1 fL      Platelets 191 10*3/mm3     T4, Free [[id number]]  (Normal) Collected: 04/27/23 1627    Specimen: Blood Updated: 04/27/23 2242     Free T4 1.33 ng/dL     Narrative:      Results may be falsely increased if patient taking Biotin.      C-reactive Protein [[id number]]  (Abnormal) Collected: 04/27/23 1627    Specimen: Blood Updated: 04/27/23 1937     C-Reactive Protein 29.82 mg/dL     Procalcitonin [[id number]]  (Abnormal) Collected: 04/27/23 1627    Specimen: Blood Updated: 04/27/23 1931     Procalcitonin 1.19 ng/mL     Narrative:      As a Marker for Sepsis (Non-Neonates):    1. <0.5 ng/mL represents a low risk of severe sepsis and/or  "septic shock.  2. >2 ng/mL represents a high risk of severe sepsis and/or septic shock.    As a Marker for Lower Respiratory Tract Infections that require antibiotic therapy:    PCT on Admission    Antibiotic Therapy       6-12 Hrs later    >0.5                Strongly Recommended  >0.25 - <0.5        Recommended   0.1 - 0.25          Discouraged              Remeasure/reassess PCT  <0.1                Strongly Discouraged     Remeasure/reassess PCT    As 28 day mortality risk marker: \"Change in Procalcitonin Result\" (>80% or <=80%) if Day 0 (or Day 1) and Day 4 values are available. Refer to http://www.[employer] [facility] [place]-pct-calculator.[facility]    Change in PCT <=80%  A decrease of PCT levels below or equal to 80% defines a positive change in PCT test result representing a higher risk for 28-day all-cause mortality of patients diagnosed with severe sepsis for septic shock.    Change in PCT >80%  A decrease of PCT levels of more than 80% defines a negative change in PCT result representing a lower risk for 28-day all-cause mortality of patients diagnosed with severe sepsis or septic shock.       Lactic Acid, Plasma [[id number]]  (Normal) Collected: 04/27/23 1826    Specimen: Blood from Arm, Left Updated: 04/27/23 1901     Lactate 1.4 mmol/L     Sedimentation Rate [[id number]]  (Abnormal) Collected: 04/27/23 1624    Specimen: Blood Updated: 04/27/23 1829     Sed Rate 34 mm/hr     Mobile Draw [[id number]] Collected: 04/27/23 1627    Specimen: Blood Updated: 04/27/23 1730    Narrative:      The following orders were created for panel order Mobile Draw.  Procedure                               Abnormality         Status                     ---------                               -----------         ------                     Green Top (Gel)[[id number]]                                  Final result               Lavender Top[[id number]]                                     Final result               Gold Top - SST[[id number]]                  "                  Final result               Light Blue Top[[id number]]                                   Final result                 Please view results for these tests on the individual orders.    Light Blue Top [[id number]] Collected: 04/27/23 1627    Specimen: Blood Updated: 04/27/23 1730     Extra Tube Hold for add-ons.     Comment: Auto resulted       Lavender Top [[id number]] Collected: 04/27/23 1627    Specimen: Blood Updated: 04/27/23 1730     Extra Tube hold for add-on     Comment: Auto resulted       Gold Top - SST [[id number]] Collected: 04/27/23 1627    Specimen: Blood Updated: 04/27/23 1730     Extra Tube Hold for add-ons.     Comment: Auto resulted.       Green Top (Gel) [[id number]] Collected: 04/27/23 1627    Specimen: Blood Updated: 04/27/23 1730     Extra Tube Hold for add-ons.     Comment: Auto resulted.       Comprehensive Metabolic Panel [[id number]]  (Abnormal) Collected: 04/27/23 1627    Specimen: Blood Updated: 04/27/23 1655     Glucose 105 mg/dL      BUN 25 mg/dL      Creatinine 1.16 mg/dL      Sodium 132 mmol/L      Potassium 3.3 mmol/L      Chloride 99 mmol/L      CO2 17.9 mmol/L      Calcium 9.4 mg/dL      Total Protein 6.5 g/dL      Albumin 3.4 g/dL      ALT (SGPT) 11 U/L      AST (SGOT) 26 U/L      Alkaline Phosphatase 51 U/L      Total Bilirubin 0.5 mg/dL      Globulin 3.1 gm/dL      A/G Ratio 1.1 g/dL      BUN/Creatinine Ratio 21.6     Anion Gap 15.1 mmol/L      eGFR 52.1 mL/min/1.73     Narrative:      GFR Normal >60  Chronic Kidney Disease <60  Kidney Failure <15      CBC & Differential [[id number]]  (Abnormal) Collected: 04/27/23 1627    Specimen: Blood Updated: 04/27/23 1637    Narrative:      The following orders were created for panel order CBC & Differential.  Procedure                               Abnormality         Status                     ---------                               -----------         ------                     CBC Auto Differential[[id number]]        Abnormal             Final result                 Please view results for these tests on the individual orders.    CBC Auto Differential [[id number]]  (Abnormal) Collected: 04/27/23 1627    Specimen: Blood Updated: 04/27/23 1637     WBC 13.86 10*3/mm3      RBC 4.06 10*6/mm3      Hemoglobin 12.0 g/dL      Hematocrit 35.6 %      MCV 87.7 fL      MCH 29.6 pg      MCHC 33.7 g/dL      RDW 14.0 %      RDW-SD 45.0 fl      MPV 11.5 fL      Platelets 220 10*3/mm3      Neutrophil % 78.7 %      Lymphocyte % 12.0 %      Monocyte % 7.9 %      Eosinophil % 0.5 %      Basophil % 0.3 %      Immature Grans % 0.6 %      Neutrophils, Absolute 10.91 10*3/mm3      Lymphocytes, Absolute 1.67 10*3/mm3      Monocytes, Absolute 1.09 10*3/mm3      Eosinophils, Absolute 0.07 10*3/mm3      Basophils, Absolute 0.04 10*3/mm3      Immature Grans, Absolute 0.08 10*3/mm3      nRBC 0.0 /100 WBC           Imaging Results (Last 24 Hours)       Procedure Component Value Units Date/Time    CT Abdomen Pelvis With Contrast [[id number]] Collected: 04/27/23 1920     Updated: 04/27/23 1925    Narrative:      PROCEDURE: CT ABDOMEN PELVIS W CONTRAST-     HISTORY: suprapubic/pelvic abscess     Comparison: September 13, 2016     FINDINGS: Axial CT images of the abdomen and pelvis were obtained with  IV contrast only. Coronal reformatted images were also obtained. This  study was performed with techniques to keep radiation doses as low as  reasonably achievable, (ALARA). Individualized dose reduction techniques  using automated exposure control or adjustment of mA and/or kV according  to the patient size were employed.     There is mild emphysema in the lung bases. The liver has an unremarkable  appearance, without evidence of mass. Postoperative changes are seen  from cholecystectomy. There is no evidence of biliary ductal dilatation.  The pancreas appears normal. The spleen size is within normal limits.  There is no evidence of renal mass or hydronephrosis. There is  no  evidence of adenopathy. No abnormal fluid collection is seen. No  localized inflammatory process is identified. Severe vascular  calcifications are noted.     Images of the pelvis reveal multiple fluid-filled small bowel loops in a  nonspecific pattern. Multiple diverticula are noted in the sigmoid  colon. The appendix is unremarkable. Stranding is noted of the  suprapubic anterior subcutaneous fat system with edema or cellulitis. No  well-defined focal fluid collection is seen to suggest an abscess.  Several borderline sized inguinal nodes are seen.           Impression:      Suprapubic subcutaneous fat stranding consistent with edema  or cellulitis. No well-defined abscess is identified.     Small amount of pelvic free fluid likely reactive.     Multiple fluid-filled small bowel loops in a nonspecific pattern but  could represent an enteritis.           This report was signed and finalized on 4/27/2023 7:23 PM by Pramod Baron MD.          Orders (last 24 hrs)        Start     Ordered    04/29/23 0600  Vancomycin, Random  Morning Draw         04/27/23 2249 04/29/23 0600  Basic Metabolic Panel  Morning Draw         04/28/23 0055    04/29/23 0600  CBC (No Diff)  Once         04/28/23 0055    04/29/23 0600  CBC (No Diff)  Morning Draw         04/28/23 0923    04/29/23 0600  Comprehensive Metabolic Panel  Morning Draw         04/28/23 0923    04/28/23 1400  ibuprofen (ADVIL,MOTRIN) tablet 600 mg  Every 8 Hours Scheduled         04/28/23 0922    04/28/23 1102  Inpatient Admission  Once         04/28/23 1101    04/28/23 1100  vancomycin 1250 mg/250 mL 0.9% NS IVPB ([facility])  Every 24 Hours,   Status:  Discontinued         04/27/23 2249 04/28/23 1015  potassium chloride (KLOR-CON) packet 20 mEq  2 Times Daily         04/28/23 0927    04/28/23 0923  Code Status and Medical Interventions:  Continuous         04/28/23 0923    04/28/23 0923  Place Sequential Compression Device  Once         04/28/23 0923    04/28/23  0923  Maintain Sequential Compression Device  Continuous         04/28/23 0923 04/28/23 0923  Diet: Regular/House Diet; Texture: Regular Texture (IDDSI 7); Fluid Consistency: Thin (IDDSI 0)  Diet Effective Now         04/28/23 0923 04/28/23 0921  HYDROcodone-acetaminophen (NORCO) 5-325 MG per tablet 1 tablet  Every 6 Hours PRN         04/28/23 0922 04/28/23 0900  fenofibrate (TRICOR) tablet 145 mg  Daily         04/27/23 2207 04/28/23 0900  DULoxetine (CYMBALTA) DR capsule 30 mg  Daily         04/27/23 2207 04/28/23 0900  vancomycin 1 g/250 mL 0.9% NS (vial-mate)  Every 12 Hours         04/28/23 0738    04/28/23 0600  pantoprazole (PROTONIX) EC tablet 40 mg  Every Early Morning         04/27/23 2207 04/28/23 0600  CBC (No Diff)  Morning Draw         04/27/23 2207 04/28/23 0600  Comprehensive Metabolic Panel  Morning Draw         04/27/23 2207 04/28/23 0600  Hemoglobin A1c  Morning Draw         04/27/23 2207 04/28/23 0600  TSH  Morning Draw         04/27/23 2207 04/28/23 0600  Vancomycin, Random  Morning Draw         04/28/23 0105    04/28/23 0300  aztreonam (AZACTAM) 2 g/100 mL 0.9% NS (mbp)  Every 8 Hours         04/27/23 2207 04/27/23 2300  pravastatin (PRAVACHOL) tablet 40 mg  Nightly         04/27/23 2207 04/27/23 2300  gabapentin (NEURONTIN) capsule 400 mg  Nightly         04/27/23 2207 04/27/23 2300  docusate sodium (COLACE) capsule 100 mg  2 Times Daily         04/27/23 2207 04/27/23 2300  aztreonam (AZACTAM) 2 g/100 mL 0.9% NS (mbp)  Once,   Status:  Discontinued         04/27/23 2207 04/27/23 2251  Consult to Wound / Ostomy Care  Once         04/27/23 2251 04/27/23 2246  MRSA Screen, PCR (Inpatient) - Swab, Nares  Once         04/27/23 2246 04/27/23 2207  T4, Free  Once         04/27/23 2207 04/27/23 2204  Pharmacy to dose vancomycin  Continuous PRN         04/27/23 2207 04/27/23 2203  simethicone (MYLICON) chewable tablet 80 mg  Every 6 Hours PRN          04/27/23 2207 04/27/23 2202  zolpidem (AMBIEN) tablet 10 mg  Nightly PRN         04/27/23 2207 04/27/23 2135  zolpidem (AMBIEN) tablet 10 mg  Nightly PRN,   Status:  Discontinued         04/27/23 2135 04/27/23 2058  Initiate Observation Status  Once         04/27/23 2058 04/27/23 1947  Initiate Observation Status  Once,   Status:  Canceled         04/27/23 1947 04/27/23 1920  sodium chloride 0.9 % bolus 1,000 mL  Once         04/27/23 1918 04/27/23 1848  iopamidol (ISOVUE-300) 61 % injection 100 mL  Once in Imaging         04/27/23 1846 04/27/23 1830  metroNIDAZOLE (FLAGYL) IVPB 500 mg  Once         04/27/23 1755 04/27/23 1830  vancomycin 1500 mg/500 mL 0.9% NS IVPB ([facility])  Once         04/27/23 1755 04/27/23 1800  aztreonam (AZACTAM) 2 g/100 mL 0.9% NS (mbp)  Once         04/27/23 1755    04/27/23 1758  sodium chloride 0.9 % bolus 1,000 mL  Once         04/27/23 1756 04/27/23 1758  ondansetron (ZOFRAN) injection 4 mg  Once         04/27/23 1756 04/27/23 1758  Morphine sulfate (PF) injection 2 mg  Once         04/27/23 1756 04/27/23 1755  CT Abdomen Pelvis With Contrast  1 Time Imaging         04/27/23 1755 04/27/23 1754  Saline Lock IV  Continuous         04/27/23 1755    04/27/23 1754  Lactic Acid, Plasma  STAT         04/27/23 1755    04/27/23 1754  Procalcitonin  STAT         04/27/23 1755    04/27/23 1754  Sedimentation Rate  STAT         04/27/23 1755    04/27/23 1754  C-reactive Protein  STAT         04/27/23 1755    04/27/23 1754  Blood Culture With TYRON - Blood, Arm, Left  Once         04/27/23 1755    04/27/23 1754  Blood Culture With TYRON - Blood, Arm, Left  Once         04/27/23 1755    04/27/23 1635  CBC Auto Differential  Once         04/27/23 1634    04/27/23 1633  CBC & Differential  Once         04/27/23 1633    04/27/23 1633  Comprehensive Metabolic Panel  Once         04/27/23 1633    04/27/23 1625  Insert peripheral IV  Once         04/27/23 1624     23 1625  [place] Draw  Once         23 1624    23 1625  Green Top (Gel)  PROCEDURE ONCE         23 1624    23 1625  Lavender Top  PROCEDURE ONCE         23 1624    23 1625  Gold Top - SST  PROCEDURE ONCE         23 1624    23 1625  Light Blue Top  PROCEDURE ONCE         23 1624    23 1624  sodium chloride 0.9 % flush 10 mL  As Needed         23    --  DULoxetine (CYMBALTA) 30 MG capsule  Daily         23    --  traZODone (DESYREL) 50 MG tablet  Nightly         23    --  gabapentin (NEURONTIN) 400 MG capsule  Nightly         23    --  pravastatin (PRAVACHOL) 40 MG tablet  Nightly         23    --  cetirizine (zyrTEC) 10 MG tablet  Daily         23    --  QUEtiapine (SEROquel) 50 MG tablet  Nightly         23    --  levothyroxine (SYNTHROID, LEVOTHROID) 75 MCG tablet  Daily         23 1123    --  ibuprofen (ADVIL,MOTRIN) 600 MG tablet  Every 8 Hours PRN         23    --  tiZANidine (ZANAFLEX) 4 MG tablet  Nightly PRN         23    --  Sennosides-Docusate Sodium (Senna Plus) 8.6-50 MG per capsule  Daily         23 1123                     Physician Progress Notes (last 24 hours)        Ulises Coombs MD at 23 0923                [facility]  INTERNAL MEDICINE PROGRESS NOTE    Name:  Leann Antonio   Age:  67 y.o.  Sex:  female  :  1955  MRN:  [id number]   Visit Number:  [id number]  Admission Date:  2023  Date Of Service:  23  Primary Care Physician:  Ulises Coombs MD     LOS: 0 days :  Patient Care Team:  Ulises Coombs MD as PCP - General (Internal Medicine):      Subjective / Interval History:     67-year-old patient who has been admitted because of cellulitis over the suprapubic region s/p I&D who had failed outpatient management with oral antibiotic few days prior to admission.  She has been  admitted yesterday and started on IV Vanco as well as aztreonam.  The patient is very sore and is having pain.  Labs reviewed and discussed with her      Vital Signs:    Temp:  [97.3 °F (36.3 °C)-98.1 °F (36.7 °C)] 97.5 °F (36.4 °C)  Heart Rate:  [65-86] 74  Resp:  [16-18] 18  BP: ()/(49-63) 126/54    Intake and output:    I/O last 3 completed shifts:  In: 1100 [IV Piggyback:1100]  Out: -   No intake/output data recorded.    Physical Examination:    General Appearance:    Alert and cooperative, not in any acute distress.   Head:    Atraumatic and normocephalic, without obvious abnormality.   Eyes:            PERRLA,  No pallor. Extraocular movements are within normal limits.   Neck:   Supple,  No lymph glands, no bruit   Lungs:     Chest shape is normal. Breath sounds heard bilaterally equally.  No crackles or wheezing.     Heart:    Normal S1 and S2, no murmur,  No JVD   Abdomen:     Normal bowel sounds, no masses, no organomegaly. Soft     nontender, no guarding, no rebound tenderness   Extremities:   Moves all extremities well, no edema, no cyanosis,    Skin:  Redness induration and tenderness noted over the suprapubic region which is around 4 x 5 inches around the incision area 2 inches above the pubic bone not much Carlos you okay great   Neurologic:   Grossly nonfocal and moves all extremities.      Laboratory results:  Results from last 7 days   Lab Units 04/28/23 0457 04/27/23 1627   SODIUM mmol/L 136 132*   POTASSIUM mmol/L 3.2* 3.3*   CHLORIDE mmol/L 106 99   CO2 mmol/L 21.8* 17.9*   BUN mg/dL 15 25*   CREATININE mg/dL 0.69 1.16*   CALCIUM mg/dL 8.3* 9.4   BILIRUBIN mg/dL 0.4 0.5   ALK PHOS U/L 43 51   ALT (SGPT) U/L 9 11   AST (SGOT) U/L 23 26   GLUCOSE mg/dL 87 105*     Results from last 7 days   Lab Units 04/28/23 0457 04/27/23 1627 04/25/23 2009   WBC 10*3/mm3 6.90 13.86* 12.22*   HEMOGLOBIN g/dL 10.1* 12.0 12.2   HEMATOCRIT % 30.9* 35.6 36.4   PLATELETS 10*3/mm3 191 220 211              Results from last 7 days   Lab Units 04/27/23  1834 04/27/23  1826 04/25/23 2046   BLOODCX  No growth at less than 24 hours No growth at less than 24 hours  --    WOUNDCX   --   --  Light growth (2+) Staphylococcus aureus*       Radiology results:    Imaging Results (Last 24 Hours)       Procedure Component Value Units Date/Time    CT Abdomen Pelvis With Contrast [[id number]] Collected: 04/27/23 1920     Updated: 04/27/23 1925    Narrative:      PROCEDURE: CT ABDOMEN PELVIS W CONTRAST-     HISTORY: suprapubic/pelvic abscess     Comparison: September 13, 2016     FINDINGS: Axial CT images of the abdomen and pelvis were obtained with  IV contrast only. Coronal reformatted images were also obtained. This  study was performed with techniques to keep radiation doses as low as  reasonably achievable, (ALARA). Individualized dose reduction techniques  using automated exposure control or adjustment of mA and/or kV according  to the patient size were employed.     There is mild emphysema in the lung bases. The liver has an unremarkable  appearance, without evidence of mass. Postoperative changes are seen  from cholecystectomy. There is no evidence of biliary ductal dilatation.  The pancreas appears normal. The spleen size is within normal limits.  There is no evidence of renal mass or hydronephrosis. There is no  evidence of adenopathy. No abnormal fluid collection is seen. No  localized inflammatory process is identified. Severe vascular  calcifications are noted.     Images of the pelvis reveal multiple fluid-filled small bowel loops in a  nonspecific pattern. Multiple diverticula are noted in the sigmoid  colon. The appendix is unremarkable. Stranding is noted of the  suprapubic anterior subcutaneous fat system with edema or cellulitis. No  well-defined focal fluid collection is seen to suggest an abscess.  Several borderline sized inguinal nodes are seen.           Impression:      Suprapubic subcutaneous fat stranding  consistent with edema  or cellulitis. No well-defined abscess is identified.     Small amount of pelvic free fluid likely reactive.     Multiple fluid-filled small bowel loops in a nonspecific pattern but  could represent an enteritis.           This report was signed and finalized on 4/27/2023 7:23 PM by Pramod Baron MD.            I have reviewed the patient's radiology reports.    Medication Review:     I have reviewed the patients active and prn medications.     Assessment:      Cellulitis of suprapubic region    Depression    Hyperlipidemia    Hypothyroidism (acquired)  Neuropathy    Plan:    Cellulitis of pubic region-we will continue with the current IV antibiotic and follow-up on the cultures    Hypokalemia-we will continue with the replacement    Neuropathy with acute pain due to cellulitis start with nonsteroidals as well as Norco as needed for severe pain    See rest as per orders  Ulises Coombs MD  04/28/23  09:23 EDT      Please note that portions of this note were completed with a voice recognition program.      Electronically signed by Ulises Coombs MD at 04/28/23 0933       Consult Notes (last 24 hours)  Notes from 04/27/23 1125 through 04/28/23 1125   No notes of this type exist for this encounter.

## 2023-04-28 NOTE — H&P
[facility]   HISTORY AND PHYSICAL      Name:  Leann Antonio   Age:  67 y.o.  Sex:  female  :  1955  MRN:  [id number]   Visit Number:  [id number]  Admission Date:  2023  Date Of Service:  23  Primary Care Physician:  Ulises Coombs MD     Admitting diagnosis:      Cellulitis of suprapubic region    Depression    Hyperlipidemia    Hypothyroidism (acquired)        History Of Presenting Illness:      67-year-old patient with history of depression, hypothyroidism, hyperlipidemia, GERD, neuropathy, who came on  to the ER because of pain swelling and abscess over the suprapubic region.  She had I&D done and was discharged on oral Bactrim but got worse so she came to the ER again.  Upon evaluation she was noted to have cellulitis and has failed outpatient antibiotics so patient has been admitted for further IV antibiotic and under observation.  She states that she has been having pain and has been uncomfortable with walking.  There is no fever or chills.  There has been some serous drainage from the incision which has been done  Labs have been reviewed with the scan results as below    Review Of Systems:     The following systems were reviewed and negative;  constitution, eyes, ENT, respiratory, cardiovascular, gastrointestinal, genitourinary, musculoskeletal, neurological and behavioral/psych,  Skin except as above.     Past Medical History:    Past Medical History:   Diagnosis Date   • Depression    • GERD (gastroesophageal reflux disease)    • Hyperlipidemia    • Panic attacks        Past Surgical history:    Past Surgical History:   Procedure Laterality Date   • CHOLECYSTECTOMY     • HYSTERECTOMY         Social History:    Social History     Socioeconomic History   • Marital status:    Tobacco Use   • Smoking status: Former     Packs/day: 0.50     Types: Cigarettes   • Smokeless tobacco: Never   Vaping Use   • Vaping Use: Every day   Substance and  Sexual Activity   • Alcohol use: No   • Drug use: No   • Sexual activity: Defer       Family History:    Family History   Problem Relation Age of Onset   • Cirrhosis Mother    • Cirrhosis Brother    • Breast cancer Sister    • Colon cancer Neg Hx          Allergies:      Penicillins    Home Medications:    Prior to Admission Medications     Prescriptions Last Dose Informant Patient Reported? Taking?    busPIRone (BUSPAR) 15 MG tablet 4/27/2023  Yes Yes    TAKE 1 TABLET TWICE A DAY    cetirizine (zyrTEC) 10 MG tablet 4/27/2023  Yes Yes    Take 1 tablet by mouth Daily.    Cholecalciferol (VITAMIN D3) 69833 UNITS capsule   Yes No    TAKE 1 CAPSULE BY MOUTH ONCE WEEKLY    docusate sodium (COLACE) 100 MG capsule Past Week  Yes Yes    TAKE 2 CAPSULES BY MOUTH TWICE A DAY AS NEEDED FOR CONSTIPATION    DULoxetine (CYMBALTA) 30 MG capsule 4/27/2023  Yes Yes    Take 1 capsule by mouth Daily.    fenofibrate 160 MG tablet 4/26/2023  Yes Yes    TAKE 1 TABLET BY MOUTH DAILY    ferrous sulfate 325 (65 FE) MG tablet   Yes No    TAKE 1 TABLET BY MOUTH TWICE DAILY    gabapentin (NEURONTIN) 400 MG capsule 4/26/2023  Yes Yes    Take 1 capsule by mouth Every Night.    metoclopramide (REGLAN) 5 MG tablet 4/27/2023  No Yes    Take 0.5 tablets by mouth 2 (Two) Times a Day Before Meals.    multivitamin (THERAGRAN) tablet tablet   Yes No    TAKE 1 TABLET BY MOUTH ONCE DAILY    pantoprazole (PROTONIX) 40 MG EC tablet Unknown  No No    Take 1 tablet in the am 30 minutes before breakfast.    polyethylene glycol (MIRALAX) powder   Yes No    TAKE 17G IN 8OZ OF WATER EVERY DAY AS NEEDED    pravastatin (PRAVACHOL) 40 MG tablet 4/26/2023  Yes Yes    Take 1 tablet by mouth Every Night.    QUEtiapine (SEROquel) 300 MG tablet   Yes No    TAKE 1 TABLET BY MOUTH AT BEDTIME    rosuvastatin (CRESTOR) 20 MG tablet   Yes No    TAKE 1 TABLET BY MOUTH DAILY    sertraline (ZOLOFT) 100 MG tablet   Yes No    TAKE 1 TABLET TWICE A DAY    simethicone (MYLICON) 80  MG chewable tablet Past Week  Yes Yes    Chew 1 tablet Every 6 (Six) Hours As Needed for Flatulence.    sulfamethoxazole-trimethoprim (BACTRIM DS,SEPTRA DS) 800-160 MG per tablet 4/27/2023  No Yes    Take 1 tablet by mouth 2 (Two) Times a Day for 10 days.    traZODone (DESYREL) 50 MG tablet 4/26/2023  Yes Yes    Take 2 tablets by mouth Every Night.    zolpidem (AMBIEN) 10 MG tablet 4/26/2023  Yes Yes    Take 1 tablet by mouth At Night As Needed for Sleep.                 Vital Signs:    Temp:  [97.3 °F (36.3 °C)] 97.3 °F (36.3 °C)  Heart Rate:  [65-86] 65  Resp:  [16-18] 16  BP: (115-144)/(53-63) 144/53        04/27/23  1607   Weight: 72.6 kg (160 lb)       Body mass index is 28.34 kg/m².    Physical Exam:      General Appearance:    Alert and cooperative,  And lying comfortably in bed   Head:    Atraumatic and normocephalic, without obvious abnormality.   Eyes:            PERRLA, conjunctivae and sclerae normal, no Icterus. No pallor. Extraocular movements are within normal limits.   Ears:    Ears appear intact with no abnormalities noted.   Throat:   No oral lesions, no thrush, oral mucosa moist.   Neck:   Supple, trachea midline, no thyromegaly, no carotid bruit, no lymphadenopathy   Lungs:    Breath sounds heard bilaterally equally.  No crackles or wheezing. No Pleural rub or bronchial breathing.       Heart:    Normal S1 and S2, no murmur, no gallop, no rub. No JVD   Abdomen:     Normal bowel sounds, no masses, no organomegaly. Soft   nontender, nondistended, no guarding, no rebound    tenderness   Extremities:   Moves all extremities well, no edema, no cyanosis, no          clubbing   Skin:  There is edema with induration around 4 to 5 inches surrounding the incision over the suprapubic region with redness tenderness and swelling   Neurologic:   Cranial nerves 2 - 12 grossly intact, sensation intact, Motor power is normal and equal bilaterally.       EKG:     not done    Labs:    Lab Results (last 24 hours)   "   Procedure Component Value Units Date/Time    C-reactive Protein [[id number]]  (Abnormal) Collected: 04/27/23 1627    Specimen: Blood Updated: 04/27/23 1937     C-Reactive Protein 29.82 mg/dL     Procalcitonin [[id number]]  (Abnormal) Collected: 04/27/23 1627    Specimen: Blood Updated: 04/27/23 1931     Procalcitonin 1.19 ng/mL     Narrative:      As a Marker for Sepsis (Non-Neonates):    1. <0.5 ng/mL represents a low risk of severe sepsis and/or septic shock.  2. >2 ng/mL represents a high risk of severe sepsis and/or septic shock.    As a Marker for Lower Respiratory Tract Infections that require antibiotic therapy:    PCT on Admission    Antibiotic Therapy       6-12 Hrs later    >0.5                Strongly Recommended  >0.25 - <0.5        Recommended   0.1 - 0.25          Discouraged              Remeasure/reassess PCT  <0.1                Strongly Discouraged     Remeasure/reassess PCT    As 28 day mortality risk marker: \"Change in Procalcitonin Result\" (>80% or <=80%) if Day 0 (or Day 1) and Day 4 values are available. Refer to http://[email or web address]-pct-calculator.com    Change in PCT <=80%  A decrease of PCT levels below or equal to 80% defines a positive change in PCT test result representing a higher risk for 28-day all-cause mortality of patients diagnosed with severe sepsis for septic shock.    Change in PCT >80%  A decrease of PCT levels of more than 80% defines a negative change in PCT result representing a lower risk for 28-day all-cause mortality of patients diagnosed with severe sepsis or septic shock.       Lactic Acid, Plasma [[id number]]  (Normal) Collected: 04/27/23 1826    Specimen: Blood from Arm, Left Updated: 04/27/23 1901     Lactate 1.4 mmol/L     Blood Culture With TYRON - Blood, Arm, Left [[id number]] Collected: 04/27/23 1826    Specimen: Blood from Arm, Left Updated: 04/27/23 1843    Blood Culture With TYRON - Blood, Arm, Left [[id number]] Collected: 04/27/23 1834    Specimen: Blood from Arm, " Left Updated: 04/27/23 1843    Sedimentation Rate [[id number]]  (Abnormal) Collected: 04/27/23 1624    Specimen: Blood Updated: 04/27/23 1829     Sed Rate 34 mm/hr     Ruth Draw [[id number]] Collected: 04/27/23 1627    Specimen: Blood Updated: 04/27/23 1730    Narrative:      The following orders were created for panel order Ruth Draw.  Procedure                               Abnormality         Status                     ---------                               -----------         ------                     Green Top (Gel)[[id number]]                                  Final result               Lavender Top[[id number]]                                     Final result               Gold Top - SST[[id number]]                                   Final result               Light Blue Top[[id number]]                                   Final result                 Please view results for these tests on the individual orders.    Light Blue Top [[id number]] Collected: 04/27/23 1627    Specimen: Blood Updated: 04/27/23 1730     Extra Tube Hold for add-ons.     Comment: Auto resulted       Lavender Top [[id number]] Collected: 04/27/23 1627    Specimen: Blood Updated: 04/27/23 1730     Extra Tube hold for add-on     Comment: Auto resulted       Gold Top - SST [[id number]] Collected: 04/27/23 1627    Specimen: Blood Updated: 04/27/23 1730     Extra Tube Hold for add-ons.     Comment: Auto resulted.       Green Top (Gel) [[id number]] Collected: 04/27/23 1627    Specimen: Blood Updated: 04/27/23 1730     Extra Tube Hold for add-ons.     Comment: Auto resulted.       Comprehensive Metabolic Panel [[id number]]  (Abnormal) Collected: 04/27/23 1627    Specimen: Blood Updated: 04/27/23 1655     Glucose 105 mg/dL      BUN 25 mg/dL      Creatinine 1.16 mg/dL      Sodium 132 mmol/L      Potassium 3.3 mmol/L      Chloride 99 mmol/L      CO2 17.9 mmol/L      Calcium 9.4 mg/dL      Total Protein 6.5 g/dL      Albumin 3.4 g/dL      ALT (SGPT) 11 U/L       AST (SGOT) 26 U/L      Alkaline Phosphatase 51 U/L      Total Bilirubin 0.5 mg/dL      Globulin 3.1 gm/dL      A/G Ratio 1.1 g/dL      BUN/Creatinine Ratio 21.6     Anion Gap 15.1 mmol/L      eGFR 52.1 mL/min/1.73     Narrative:      GFR Normal >60  Chronic Kidney Disease <60  Kidney Failure <15      CBC & Differential [[id number]]  (Abnormal) Collected: 04/27/23 1627    Specimen: Blood Updated: 04/27/23 1637    Narrative:      The following orders were created for panel order CBC & Differential.  Procedure                               Abnormality         Status                     ---------                               -----------         ------                     CBC Auto Differential[[id number]]        Abnormal            Final result                 Please view results for these tests on the individual orders.    CBC Auto Differential [[id number]]  (Abnormal) Collected: 04/27/23 1627    Specimen: Blood Updated: 04/27/23 1637     WBC 13.86 10*3/mm3      RBC 4.06 10*6/mm3      Hemoglobin 12.0 g/dL      Hematocrit 35.6 %      MCV 87.7 fL      MCH 29.6 pg      MCHC 33.7 g/dL      RDW 14.0 %      RDW-SD 45.0 fl      MPV 11.5 fL      Platelets 220 10*3/mm3      Neutrophil % 78.7 %      Lymphocyte % 12.0 %      Monocyte % 7.9 %      Eosinophil % 0.5 %      Basophil % 0.3 %      Immature Grans % 0.6 %      Neutrophils, Absolute 10.91 10*3/mm3      Lymphocytes, Absolute 1.67 10*3/mm3      Monocytes, Absolute 1.09 10*3/mm3      Eosinophils, Absolute 0.07 10*3/mm3      Basophils, Absolute 0.04 10*3/mm3      Immature Grans, Absolute 0.08 10*3/mm3      nRBC 0.0 /100 WBC           Radiology:    Imaging Results (Last 72 Hours)     Procedure Component Value Units Date/Time    CT Abdomen Pelvis With Contrast [[id number]] Collected: 04/27/23 1920     Updated: 04/27/23 1925    Narrative:      PROCEDURE: CT ABDOMEN PELVIS W CONTRAST-     HISTORY: suprapubic/pelvic abscess     Comparison: September 13, 2016     FINDINGS: Axial CT  images of the abdomen and pelvis were obtained with  IV contrast only. Coronal reformatted images were also obtained. This  study was performed with techniques to keep radiation doses as low as  reasonably achievable, (ALARA). Individualized dose reduction techniques  using automated exposure control or adjustment of mA and/or kV according  to the patient size were employed.     There is mild emphysema in the lung bases. The liver has an unremarkable  appearance, without evidence of mass. Postoperative changes are seen  from cholecystectomy. There is no evidence of biliary ductal dilatation.  The pancreas appears normal. The spleen size is within normal limits.  There is no evidence of renal mass or hydronephrosis. There is no  evidence of adenopathy. No abnormal fluid collection is seen. No  localized inflammatory process is identified. Severe vascular  calcifications are noted.     Images of the pelvis reveal multiple fluid-filled small bowel loops in a  nonspecific pattern. Multiple diverticula are noted in the sigmoid  colon. The appendix is unremarkable. Stranding is noted of the  suprapubic anterior subcutaneous fat system with edema or cellulitis. No  well-defined focal fluid collection is seen to suggest an abscess.  Several borderline sized inguinal nodes are seen.           Impression:      Suprapubic subcutaneous fat stranding consistent with edema  or cellulitis. No well-defined abscess is identified.     Small amount of pelvic free fluid likely reactive.     Multiple fluid-filled small bowel loops in a nonspecific pattern but  could represent an enteritis.           This report was signed and finalized on 4/27/2023 7:23 PM by Pramod Baron MD.          Assessment:    Assessment & Plan       Cellulitis of suprapubic region    Depression    Hyperlipidemia    Hypothyroidism (acquired)        Plan:     1.  Cellulitis of the suprapubic region-s/p I&D her white count is around 14,000 with clinical signs  symptoms for typical bacterial cellulitis.  We will start with aztreonam as well as vancomycin and see if he can get cultures and treat accordingly    Continue rest of the medication for orders and will add medication for pain if she is having severe pain over the surrounding area  Plan of care has been addressed with the patient and see rest as per orders    Ulises Coombs MD  04/27/23  22:08 EDT    Please note that portions of this note were completed with a voice recognition program.

## 2023-04-28 NOTE — PLAN OF CARE
Problem: Adult Inpatient Plan of Care  Goal: Plan of Care Review  Outcome: Ongoing, Progressing     Problem: Pain Acute  Goal: Acceptable Pain Control and Functional Ability  Outcome: Ongoing, Progressing  Intervention: Prevent or Manage Pain  Recent Flowsheet Documentation  Taken 4/28/2023 0000 by Radha Alvarado RN  Medication Review/Management: medications reviewed  Taken 4/27/2023 2200 by Radha Alvarado RN  Medication Review/Management: medications reviewed  Taken 4/27/2023 2030 by Radha Alvarado RN  Medication Review/Management: medications reviewed     Problem: Impaired Wound Healing  Goal: Optimal Wound Healing  Outcome: Ongoing, Progressing  Intervention: Promote Wound Healing  Recent Flowsheet Documentation  Taken 4/28/2023 0000 by Radha Alvarado RN  Activity Management: activity encouraged  Taken 4/27/2023 2200 by Radha Alvarado RN  Activity Management: activity encouraged     Problem: Skin Injury Risk Increased  Goal: Skin Health and Integrity  Outcome: Ongoing, Progressing  Intervention: Optimize Skin Protection  Recent Flowsheet Documentation  Taken 4/28/2023 0000 by Radha Alvarado RN  Head of Bed (HOB) Positioning: HOB at 15 degrees  Taken 4/27/2023 2200 by Radha Alvarado RN  Head of Bed (HOB) Positioning: HOB elevated  Skin Protection:   adhesive use limited   tubing/devices free from skin contact   incontinence pads utilized  Taken 4/27/2023 2030 by Radha Alvarado RN  Head of Bed (HOB) Positioning: HOB elevated  Skin Protection:   adhesive use limited   tubing/devices free from skin contact   incontinence pads utilized   Goal Outcome Evaluation:      Plan of care reviewed with patient. Patient was admitted last night for Cellulitis of her pubic area. Wound care consult placed. Patient has not rested well tonight. Ax infused as ordered. Patient did desat while trying to rest so she was placed on 2L NC. No other significant events throughout the night.

## 2023-04-28 NOTE — PROGRESS NOTES
[facility]  INTERNAL MEDICINE PROGRESS NOTE    Name:  Leann Antonio   Age:  67 y.o.  Sex:  female  :  1955  MRN:  [id number]   Visit Number:  [id number]  Admission Date:  2023  Date Of Service:  23  Primary Care Physician:  Ulises Coombs MD     LOS: 0 days :  Patient Care Team:  Ulises oCombs MD as PCP - General (Internal Medicine):      Subjective / Interval History:     67-year-old patient who has been admitted because of cellulitis over the suprapubic region s/p I&D who had failed outpatient management with oral antibiotic few days prior to admission.  She has been admitted yesterday and started on IV Vanco as well as aztreonam.  The patient is very sore and is having pain.  Labs reviewed and discussed with her      Vital Signs:    Temp:  [97.3 °F (36.3 °C)-98.1 °F (36.7 °C)] 97.5 °F (36.4 °C)  Heart Rate:  [65-86] 74  Resp:  [16-18] 18  BP: ()/(49-63) 126/54    Intake and output:    I/O last 3 completed shifts:  In: 1100 [IV Piggyback:1100]  Out: -   No intake/output data recorded.    Physical Examination:    General Appearance:    Alert and cooperative, not in any acute distress.   Head:    Atraumatic and normocephalic, without obvious abnormality.   Eyes:            PERRLA,  No pallor. Extraocular movements are within normal limits.   Neck:   Supple,  No lymph glands, no bruit   Lungs:     Chest shape is normal. Breath sounds heard bilaterally equally.  No crackles or wheezing.     Heart:    Normal S1 and S2, no murmur,  No JVD   Abdomen:     Normal bowel sounds, no masses, no organomegaly. Soft     nontender, no guarding, no rebound tenderness   Extremities:   Moves all extremities well, no edema, no cyanosis,    Skin:  Redness induration and tenderness noted over the suprapubic region which is around 4 x 5 inches around the incision area 2 inches above the pubic bone not much Carlos you okay great   Neurologic:   Grossly nonfocal and moves all  extremities.      Laboratory results:  Results from last 7 days   Lab Units 04/28/23  0457 04/27/23  1627   SODIUM mmol/L 136 132*   POTASSIUM mmol/L 3.2* 3.3*   CHLORIDE mmol/L 106 99   CO2 mmol/L 21.8* 17.9*   BUN mg/dL 15 25*   CREATININE mg/dL 0.69 1.16*   CALCIUM mg/dL 8.3* 9.4   BILIRUBIN mg/dL 0.4 0.5   ALK PHOS U/L 43 51   ALT (SGPT) U/L 9 11   AST (SGOT) U/L 23 26   GLUCOSE mg/dL 87 105*     Results from last 7 days   Lab Units 04/28/23  0457 04/27/23  1627 04/25/23 2009   WBC 10*3/mm3 6.90 13.86* 12.22*   HEMOGLOBIN g/dL 10.1* 12.0 12.2   HEMATOCRIT % 30.9* 35.6 36.4   PLATELETS 10*3/mm3 191 220 211             Results from last 7 days   Lab Units 04/27/23  1834 04/27/23  1826 04/25/23  2046   BLOODCX  No growth at less than 24 hours No growth at less than 24 hours  --    WOUNDCX   --   --  Light growth (2+) Staphylococcus aureus*       Radiology results:    Imaging Results (Last 24 Hours)     Procedure Component Value Units Date/Time    CT Abdomen Pelvis With Contrast [[id number]] Collected: 04/27/23 1920     Updated: 04/27/23 1925    Narrative:      PROCEDURE: CT ABDOMEN PELVIS W CONTRAST-     HISTORY: suprapubic/pelvic abscess     Comparison: September 13, 2016     FINDINGS: Axial CT images of the abdomen and pelvis were obtained with  IV contrast only. Coronal reformatted images were also obtained. This  study was performed with techniques to keep radiation doses as low as  reasonably achievable, (ALARA). Individualized dose reduction techniques  using automated exposure control or adjustment of mA and/or kV according  to the patient size were employed.     There is mild emphysema in the lung bases. The liver has an unremarkable  appearance, without evidence of mass. Postoperative changes are seen  from cholecystectomy. There is no evidence of biliary ductal dilatation.  The pancreas appears normal. The spleen size is within normal limits.  There is no evidence of renal mass or hydronephrosis. There is  no  evidence of adenopathy. No abnormal fluid collection is seen. No  localized inflammatory process is identified. Severe vascular  calcifications are noted.     Images of the pelvis reveal multiple fluid-filled small bowel loops in a  nonspecific pattern. Multiple diverticula are noted in the sigmoid  colon. The appendix is unremarkable. Stranding is noted of the  suprapubic anterior subcutaneous fat system with edema or cellulitis. No  well-defined focal fluid collection is seen to suggest an abscess.  Several borderline sized inguinal nodes are seen.           Impression:      Suprapubic subcutaneous fat stranding consistent with edema  or cellulitis. No well-defined abscess is identified.     Small amount of pelvic free fluid likely reactive.     Multiple fluid-filled small bowel loops in a nonspecific pattern but  could represent an enteritis.           This report was signed and finalized on 4/27/2023 7:23 PM by Pramod Baron MD.          I have reviewed the patient's radiology reports.    Medication Review:     I have reviewed the patients active and prn medications.     Assessment:      Cellulitis of suprapubic region    Depression    Hyperlipidemia    Hypothyroidism (acquired)  Neuropathy    Plan:    Cellulitis of pubic region-we will continue with the current IV antibiotic and follow-up on the cultures    Hypokalemia-we will continue with the replacement    Neuropathy with acute pain due to cellulitis start with nonsteroidals as well as Norco as needed for severe pain    See rest as per orders  Ulises Coombs MD  04/28/23  09:23 EDT      Please note that portions of this note were completed with a voice recognition program.

## 2023-04-28 NOTE — PROGRESS NOTES
Pharmacy Consult-Vancomycin Dosing    Leann Antonio is a  67 y.o. female receiving vancomycin therapy.     Indication: skin/soft tissue infection with abscess   Consulting Provider: Ulises Coombs MD    Goal AUC: 400-600 mg/:L*hr    Current Antimicrobial Therapy  Anti-Infectives (From admission, onward)      Ordered     Dose/Rate Route Frequency Start Stop    04/27/23 2249  vancomycin 1250 mg/250 mL 0.9% NS IVPB ([facility])        Ordering Provider: Ulises Coombs MD    1,250 mg  over 1.25 Hours Intravenous Every 24 Hours 04/28/23 1100 05/01/23 1059    04/27/23 2207  aztreonam (AZACTAM) 2 g/100 mL 0.9% NS (mbp)        Ordering Provider: Ulises Coombs MD    2 g  over 4 Hours Intravenous Every 8 Hours 04/28/23 0300 05/01/23 0259    04/27/23 2207  Pharmacy to dose vancomycin        Ordering Provider: Ulises Coombs MD     Does not apply Continuous PRN 04/27/23 2204 04/30/23 2203    04/27/23 1755  metroNIDAZOLE (FLAGYL) IVPB 500 mg        Ordering Provider: Pramod Marie Jr. PA-C    500 mg  over 60 Minutes Intravenous Once 04/27/23 1830 04/27/23 2028    04/27/23 1755  vancomycin 1500 mg/500 mL 0.9% NS IVPB ([facility])        Ordering Provider: Pramod Marie Jr. PA-C    20 mg/kg × 72.6 kg  333.3 mL/hr over 90 Minutes Intravenous Once 04/27/23 1830 04/27/23 2215    04/27/23 1755  aztreonam (AZACTAM) 2 g/100 mL 0.9% NS (mbp)        Ordering Provider: Pramod Marie Jr. PA-C    2 g  over 30 Minutes Intravenous Once 04/27/23 1800 04/27/23 1928            Labs  Results from last 7 days   Lab Units 04/28/23  0457 04/27/23  1627 04/25/23 2009   WBC 10*3/mm3 6.90 13.86* 12.22*   CREATININE mg/dL 0.69 1.16*  --       Estimated Creatinine Clearance: 78.2 mL/min (by C-G formula based on SCr of 0.69 mg/dL).    Temp Readings from Last 1 Encounters:   04/28/23 97.5 °F (36.4 °C) (Oral)       Microbiology Culture results    Microbiology Results (last 10 days)       Procedure Component Value - Date/Time    Blood Culture  "With TYRON - Blood, Arm, Left [[id number]]  (Normal) Collected: 04/27/23 1834    Lab Status: Preliminary result Specimen: Blood from Arm, Left Updated: 04/28/23 0645     Blood Culture No growth at less than 24 hours    Blood Culture With TYRON - Blood, Arm, Left [[id number]]  (Normal) Collected: 04/27/23 1826    Lab Status: Preliminary result Specimen: Blood from Arm, Left Updated: 04/28/23 0645     Blood Culture No growth at less than 24 hours    Wound Culture - Aspirate, Abdominal Wall [[id number]]  (Abnormal) Collected: 04/25/23 2046    Lab Status: Preliminary result Specimen: Aspirate from Abdominal Wall Updated: 04/27/23 0835     Wound Culture Light growth (2+) Staphylococcus aureus            Evaluation of Dosing     Last Dose Received in the ED/Outside Facility: Vancomycin 1500 mg iv 4/27/23 @ 2044  Is Patient on Dialysis or Renal Replacement: No    Ht - 160 cm (63\")  Wt - 76 kg (167 lb 8.8 oz)    Evaluation of Level    Results from last 7 days   Lab Units 04/28/23  0457   VANCOMYCIN RM mcg/mL 5.80                   InsightRX AUC Calculation    Current AUC:   New start    Predicted Steady State AUC on Current Dose: 505 mg/L*hr  _________________________________    Predicted Steady State AUC on New Dose:   N/A    Assessment/Plan    Pharmacy to dose vancomycin for skin/soft tissue infection. Goal -600 mg/L*hr.  Patient received loading dose of vancomycin 1500 mg (~ 20 mg/kg) IV on 4/27 @ 2044.   Vancomycin random level, collected ~ 8 hours following dose, resulted at 5.8 mcg/mL. Scr resulted at 0.69 mg/dL, represents a decrease when compared to yesterday.   Will initiate vancomycin 1000mg IV q12h.   Assess clearance by vancomycin random level on 4/29 @ 0600.  Pharmacy will continue to monitor renal function, cultures and sensitivities, and clinical status to adjust regimen as necessary.    Thanks,     Luis E Frazier, PharmD  4/28/2023 07:33 EDT    "

## 2023-04-28 NOTE — PROGRESS NOTES
"Adult Nutrition  Assessment/PES    Patient Name:  Leann Antonio  YOB: 1955  MRN: [id number]  Admit Date:  4/27/2023    Assessment Date:  4/28/2023    Comments:    Screened for wound. Pt presented to [facility] yesterday 4/27 with c/o abscess in the lower abdominal area. Pt with cellulitis.  lb, normal weight per BMI 29.68 using adjusted body weight for older adults. No diet order currently in place. RD will monitor for diet placement, will order ONS to support increased needs and wound healing. Will F/U and available PRN.      Reason for Assessment     Row Name 04/28/23 0902          Reason for Assessment    Reason For Assessment identified at risk by screening criteria     Identified At Risk by Screening Criteria large or nonhealing wound, burn or pressure injury                  Labs/Tests/Procedures/Meds     Row Name 04/28/23 0902          Labs/Procedures/Meds    Lab Results Reviewed reviewed, pertinent     Lab Results Comments Low: K+, Alb        Medications    Pertinent Medications Reviewed reviewed, pertinent     Pertinent Medications Comments colace, protonix                Physical Findings     Row Name 04/28/23 0902          Physical Findings    Overall Physical Appearance normal weight for age using adjusted body weight for older adults, AIDA pubis wound/cellulitis, Sea score 21                Estimated/Assessed Needs - Anthropometrics     Row Name 04/28/23 0903 04/28/23 0500       Anthropometrics    Weight -- 76 kg (167 lb 8.8 oz)    Height for Calculation 1.6 m (5' 3\") --    Weight for Calculation 76 kg (167 lb 8.8 oz)  CBW --       Estimated/Assessed Needs    Additional Documentation Fluid Requirements (Group);KCAL/KG (Group);Estimated Calorie Needs (Group);Protein Requirements (Group) --       Estimated Calorie Needs    Estimated Calorie Requirement (kcal/day) 1,900-2,280 --    Estimated Calorie Need Method kcal/kg --       KCAL/KG    KCAL/KG 30 Kcal/Kg (kcal);25 Kcal/Kg (kcal) " --    25 Kcal/Kg (kcal) 1900 --    30 Kcal/Kg (kcal) 2280 --       Protein Requirements    Weight Used For Protein Calculations 76 kg (167 lb 8.8 oz)  CBW --    Est Protein Requirement Amount (gms/kg) 1.5 gm protein --    Estimated Protein Requirements (gms/day) 114 --       Fluid Requirements    Fluid Requirements (mL/day) 1900  1 mL/kcal --    RDA Method (mL) 1900 --               Nutrition Prescription Ordered     Row Name 04/28/23 0906          Nutrition Prescription PO    Current PO Diet Other (comment)  No diet order in place                Evaluation of Received Nutrient/Fluid Intake     Row Name 04/28/23 0907          PO Evaluation    Number of Days PO Intake Evaluated Other (comment)  No diet order in place                   Problem/Interventions:   Problem 1     Row Name 04/28/23 0907          Nutrition Diagnoses Problem 1    Problem 1 Increased Nutrient Needs     Macronutrient Protein     Etiology (related to) Medical Diagnosis     Signs/Symptoms (evidenced by) Other (comment)  AIDA pubis wound/cellulitis                      Intervention Goal     Row Name 04/28/23 0908          Intervention Goal    General Maintain nutrition;Meet nutritional needs for age/condition;Disease management/therapy;Improved nutrition related lab(s)     PO Initiate feeding     Weight Maintain weight                Nutrition Intervention     Row Name 04/28/23 0908          Nutrition Intervention    RD/Tech Action Follow Tx progress;Await begin PO;Care plan reviewd;Recommend/ordered     Recommended/Ordered Supplement  Once diet order is placed                Nutrition Prescription     Row Name 04/28/23 0909          Other Orders    Obtain Weight Daily     Obtain Weight Ordered? No, recommended     Supplement Vitamin mineral supplement     Supplement Ordered? No, recommended     Labs K+;Phos;Mg++;Na+     Labs Ordered? No, recommended                Education/Evaluation     Row Name 04/28/23 0909          Education    Education Will  Instruct as appropriate        Monitor/Evaluation    Monitor Per protocol;Weight;Skin status;Symptoms;Pertinent labs;Other (comment)  Diet order placed                 Electronically signed by:  Melissa Mcwilliams RD  04/28/23 09:09 EDT

## 2023-04-28 NOTE — CASE MANAGEMENT/SOCIAL WORK
Discharge Planning Assessment  [facility]     Patient Name: Leann Antonio  MRN: [id number]  Today's Date: 4/28/2023    Admit Date: 4/27/2023    Plan: DCP at bedside with pt and dtr Zabrinacaroline Lambert, [phone]. Pt has no LW,POA, and has not been an inpatient in past 30 days. She provided demographics. She is independent at baseline, she does not drive, her daughter provides transportation.Her primary is Dr. Coombs and pharmacy is [facility]. She is willing to accept meds to beds.She has no DME at home, but does request a walker. She has been at current residence for 12yrs, she lives alone in an an apt on the 2nd floor.She has financial concerns, needs food bag at d/c. Dcp is to return home with her dtr driving. She has no preference for provider if DME or HH services are required.   Discharge Needs Assessment     Row Name 04/28/23 1038       Living Environment    People in Home alone    Current Living Arrangements apartment    Duration at Residence 12 yrs    Potentially Unsafe Housing Conditions none    Primary Care Provided by self    Family Caregiver if Needed child(marielle), adult    Quality of Family Relationships helpful;involved    Able to Return to Prior Arrangements yes       Resource/Environmental Concerns    Resource/Environmental Concerns none    Transportation Concerns none       Food Insecurity    Within the past 12 months, you worried that your food would run out before you got the money to buy more. Often true    Within the past 12 months, the food you bought just didn't last and you didn't have money to get more. Often true       Transition Planning    Patient/Family Anticipates Transition to home    Patient/Family Anticipated Services at Transition none    Transportation Anticipated family or friend will provide       Discharge Needs Assessment    Readmission Within the Last 30 Days no previous admission in last 30 days    Equipment Currently Used at Home none    Concerns to be Addressed other (see comments)   food bag eligible    Anticipated Changes Related to Illness none    Equipment Needed After Discharge walker, standard               Discharge Plan     Row Name 04/28/23 1042       Plan    Plan DCP at bedside with pt and dtr Zabrina Lambert, [phone]. Pt has no LW,POA, and has not been an inpatient in past 30 days. She provided demographics. She is independent at baseline, she does not drive, her daughter provides transportation.Her primary is Dr. Coombs and pharmacy is [facility]. She is willing to accept meds to beds.She has no DME at home, but does request a walker. She has been at current residence for 12yrs, she lives alone in an an apt on the 2nd floor.She has financial concerns, needs food bag at d/c. Dcp is to return home with her dtr driving. She has no preference for provider if DME or HH services are required.              Continued Care and Services - Admitted Since 4/27/2023    Coordination has not been started for this encounter.       Expected Discharge Date and Time     Expected Discharge Date Expected Discharge Time    Apr 30, 2023          Demographic Summary     Row Name 04/28/23 1037       General Information    Admission Type observation    Arrived From emergency department    Required Notices Provided Observation Status Notice    Referral Source admission list    Reason for Consult discharge planning    Preferred Language English       Contact Information    Permission Granted to Share Info With family/designee               Functional Status     Row Name 04/28/23 1038       Functional Status    Usual Activity Tolerance good    Current Activity Tolerance moderate       Functional Status, IADL    Medications independent    Meal Preparation independent    Housekeeping independent    Laundry independent    Shopping independent       Mental Status    General Appearance WDL WDL       Mental Status Summary    Recent Changes in Mental Status/Cognitive Functioning no changes       Employment/     Employment Status retired               Psychosocial    No documentation.                Abuse/Neglect    No documentation.                Legal    No documentation.                Substance Abuse    No documentation.                Patient Forms    No documentation.                   Sonya Bar RN

## 2023-04-28 NOTE — PROGRESS NOTES
Pharmacy Consult-Vancomycin Dosing    Leann Antonio is a  67 y.o. female receiving vancomycin therapy.     Indication: skin/soft tissue infection  Consulting Provider: Dr. SHAILESH Coombs    Goal AUC: 400-600 mg/:L*hr    Current Antimicrobial Therapy  Anti-Infectives (From admission, onward)      Ordered     Dose/Rate Route Frequency Start Stop    04/27/23 2249  vancomycin 1250 mg/250 mL 0.9% NS IVPB ([facility])        Ordering Provider: Ulises Coombs MD    1,250 mg  over 1.25 Hours Intravenous Every 24 Hours 04/28/23 1100 05/01/23 1059    04/27/23 2207  aztreonam (AZACTAM) 2 g/100 mL 0.9% NS (mbp)        Ordering Provider: Ulises Coombs MD    2 g  over 4 Hours Intravenous Every 8 Hours 04/28/23 0300 05/01/23 0259    04/27/23 2207  Pharmacy to dose vancomycin        Ordering Provider: Ulises Coombs MD     Does not apply Continuous PRN 04/27/23 2204 04/30/23 2203    04/27/23 1755  metroNIDAZOLE (FLAGYL) IVPB 500 mg        Ordering Provider: Pramod Marie Jr. PA-C    500 mg  over 60 Minutes Intravenous Once 04/27/23 1830 04/27/23 2028    04/27/23 1755  vancomycin 1500 mg/500 mL 0.9% NS IVPB ([facility])        Ordering Provider: Pramod Marie Jr. PA-C    20 mg/kg × 72.6 kg  333.3 mL/hr over 90 Minutes Intravenous Once 04/27/23 1830 04/27/23 2215    04/27/23 1755  aztreonam (AZACTAM) 2 g/100 mL 0.9% NS (mbp)        Ordering Provider: Pramod Marie Jr. PA-C    2 g  over 30 Minutes Intravenous Once 04/27/23 1800 04/27/23 1928            Labs  Results from last 7 days   Lab Units 04/27/23  1627 04/25/23 2009   WBC 10*3/mm3 13.86* 12.22*   CREATININE mg/dL 1.16*  --       Estimated Creatinine Clearance: 45.6 mL/min (A) (by C-G formula based on SCr of 1.16 mg/dL (H)).    Temp Readings from Last 1 Encounters:   04/27/23 98.1 °F (36.7 °C) (Oral)       Microbiology Culture results    Microbiology Results (last 10 days)       Procedure Component Value - Date/Time    Wound Culture - Aspirate, Abdominal Wall [[id number]]   "(Abnormal) Collected: 04/25/23 2046    Lab Status: Preliminary result Specimen: Aspirate from Abdominal Wall Updated: 04/27/23 0835     Wound Culture Light growth (2+) Staphylococcus aureus            Evaluation of Dosing     Last Dose Received in the ED/Outside Facility: Vancomycin 1500 mg iv 4/27/23 @ 2044  Is Patient on Dialysis or Renal Replacement: No    Ht - 160 cm (63\")  Wt - 72.6 kg (160 lb)    Evaluation of Level                      InsightRX AUC Calculation    Current AUC:   New start    Predicted Steady State AUC on Current Dose: 505 mg/L*hr  _________________________________    Predicted Steady State AUC on New Dose:   N/A    Assessment/Plan    Pharmacy to dose vancomycin for skin/soft tissue infection. Goal -600 mg/L*hr.  Patient received loading dose of vancomycin 1500 mg (~ 20 mg/kg) IV on 4/27 @ 2044. Initiate maintenance dose of vancomycin 1250 mg (~ 17.2 mg/kg) IV Q 24 hr on 4/28 @ 1100.  Assess clearance by vancomycin random level on 4/28 @ 0600.  Pharmacy will continue to monitor renal function, cultures and sensitivities, and clinical status to adjust regimen as necessary.      Thank you,    Theresa Lan RPH,PharmD  04/28/23 00:53 EDT             "

## 2023-04-29 VITALS
TEMPERATURE: 97.8 F | SYSTOLIC BLOOD PRESSURE: 142 MMHG | OXYGEN SATURATION: 96 % | BODY MASS INDEX: 29.69 KG/M2 | HEART RATE: 73 BPM | WEIGHT: 167.55 LBS | RESPIRATION RATE: 18 BRPM | HEIGHT: 63 IN | DIASTOLIC BLOOD PRESSURE: 62 MMHG

## 2023-04-29 LAB
ALBUMIN SERPL-MCNC: 3.1 G/DL (ref 3.5–5.2)
ALBUMIN/GLOB SERPL: 1.1 G/DL
ALP SERPL-CCNC: 50 U/L (ref 39–117)
ALT SERPL W P-5'-P-CCNC: 9 U/L (ref 1–33)
ANION GAP SERPL CALCULATED.3IONS-SCNC: 9.1 MMOL/L (ref 5–15)
AST SERPL-CCNC: 25 U/L (ref 1–32)
BILIRUB SERPL-MCNC: 0.3 MG/DL (ref 0–1.2)
BUN SERPL-MCNC: 10 MG/DL (ref 8–23)
BUN/CREAT SERPL: 16.1 (ref 7–25)
CALCIUM SPEC-SCNC: 8.8 MG/DL (ref 8.6–10.5)
CHLORIDE SERPL-SCNC: 106 MMOL/L (ref 98–107)
CO2 SERPL-SCNC: 22.9 MMOL/L (ref 22–29)
CREAT SERPL-MCNC: 0.62 MG/DL (ref 0.57–1)
DEPRECATED RDW RBC AUTO: 51 FL (ref 37–54)
EGFRCR SERPLBLD CKD-EPI 2021: 98.4 ML/MIN/1.73
ERYTHROCYTE [DISTWIDTH] IN BLOOD BY AUTOMATED COUNT: 14.8 % (ref 12.3–15.4)
GLOBULIN UR ELPH-MCNC: 2.8 GM/DL
GLUCOSE SERPL-MCNC: 101 MG/DL (ref 65–99)
HCT VFR BLD AUTO: 33.2 % (ref 34–46.6)
HGB BLD-MCNC: 10.8 G/DL (ref 12–15.9)
MCH RBC QN AUTO: 30.3 PG (ref 26.6–33)
MCHC RBC AUTO-ENTMCNC: 32.5 G/DL (ref 31.5–35.7)
MCV RBC AUTO: 93.3 FL (ref 79–97)
PLATELET # BLD AUTO: 217 10*3/MM3 (ref 140–450)
PMV BLD AUTO: 11.5 FL (ref 6–12)
POTASSIUM SERPL-SCNC: 3.9 MMOL/L (ref 3.5–5.2)
PROT SERPL-MCNC: 5.9 G/DL (ref 6–8.5)
RBC # BLD AUTO: 3.56 10*6/MM3 (ref 3.77–5.28)
SODIUM SERPL-SCNC: 138 MMOL/L (ref 136–145)
VANCOMYCIN SERPL-MCNC: 4.2 MCG/ML (ref 5–40)
WBC NRBC COR # BLD: 5.75 10*3/MM3 (ref 3.4–10.8)

## 2023-04-29 PROCEDURE — 80202 ASSAY OF VANCOMYCIN: CPT | Performed by: INTERNAL MEDICINE

## 2023-04-29 PROCEDURE — 0 CEFAZOLIN SODIUM-DEXTROSE 1-4 GM-%(50ML) RECONSTITUTED SOLUTION: Performed by: INTERNAL MEDICINE

## 2023-04-29 PROCEDURE — 80053 COMPREHEN METABOLIC PANEL: CPT | Performed by: INTERNAL MEDICINE

## 2023-04-29 PROCEDURE — 85027 COMPLETE CBC AUTOMATED: CPT | Performed by: INTERNAL MEDICINE

## 2023-04-29 RX ADMIN — DULOXETINE 30 MG: 30 CAPSULE, DELAYED RELEASE ORAL at 08:26

## 2023-04-29 RX ADMIN — CEFAZOLIN SODIUM 1 G: 1 SOLUTION INTRAVENOUS at 03:37

## 2023-04-29 RX ADMIN — FENOFIBRATE 145 MG: 145 TABLET, FILM COATED ORAL at 08:26

## 2023-04-29 RX ADMIN — IBUPROFEN 600 MG: 200 TABLET, FILM COATED ORAL at 05:26

## 2023-04-29 RX ADMIN — DOCUSATE SODIUM 100 MG: 100 CAPSULE, LIQUID FILLED ORAL at 08:26

## 2023-04-29 RX ADMIN — PANTOPRAZOLE SODIUM 40 MG: 40 TABLET, DELAYED RELEASE ORAL at 05:26

## 2023-04-29 RX ADMIN — CEFAZOLIN SODIUM 1 G: 1 SOLUTION INTRAVENOUS at 08:25

## 2023-04-29 RX ADMIN — POTASSIUM CHLORIDE 20 MEQ: 1.5 POWDER, FOR SOLUTION ORAL at 08:26

## 2023-04-29 RX ADMIN — CEFAZOLIN SODIUM 1 G: 1 SOLUTION INTRAVENOUS at 14:00

## 2023-04-29 NOTE — DISCHARGE SUMMARY
[facility]   INTERNAL MEDICINE DISCHARGE SUMMARY      Name:  Leann Antonio   Age:  67 y.o.  Sex:  female  :  1955  MRN:  [id number]   Visit Number:  [id number]  Primary Care Physician:  Ulises Coombs MD  Date of Discharge:  2023  Admission Date:  2023      Discharge Diagnosis:         Cellulitis of suprapubic region    Depression    Hyperlipidemia    Hypothyroidism (acquired)          Consults:     Consults     No orders found from 3/29/2023 to 2023.          Procedures Performed:                 Hospital Course:   The patient was admitted on 2023  Please see H&P for details on admission HPI and ROS.  67-year-old patient with history of depression, hypothyroidism, hyperlipidemia, GERD, neuropathy, who came on  to the ER because of pain swelling and abscess over the suprapubic region.  She had I&D done and was discharged on oral Bactrim but got worse so she came to the ER again.  Upon evaluation she was noted to have cellulitis and has failed outpatient antibiotics so patient has been admitted for further IV antibiotic and under observation.  She states that she has been having pain and has been uncomfortable with walking.  There is no fever or chills.  There has been some serous drainage from the incision which has been done  Labs have been reviewed with the scan results as below    After admission she was started initially on vancomycin and aztreonam and based on the cultures it was Staph aureus and was switched over to Ancef.  Patient had white count of around 14,000 which has improved.  She has been having still serosanguineous drainage from the abscess and indurated area but is improved and the cellulitis is improving.  There is still induration noted and patient is able to walk better and the soreness has improved.  So the patient prefers to go home and we will discharge her on oral Bactrim which she already has.  Patient will be closely followed up  as an outpatient and I will see her in the office next Friday, May 5 which has already been scheduled    Vital Signs:     Temp:  [97.1 °F (36.2 °C)-98.3 °F (36.8 °C)] 97.8 °F (36.6 °C)  Heart Rate:  [68-73] 73  Resp:  [16-19] 18  BP: (116-142)/(55-68) 142/62    Physical Exam:     General Appearance:    Alert and cooperative, not in any acute distress.   Head:    Atraumatic and normocephalic, without obvious abnormality.   Eyes:            PERRLA,  No pallor. Extraocular movements are within normal limits.   Neck:   Supple,  No lymph glands, no bruit   Lungs:     Chest shape is normal. Breath sounds heard bilaterally equally.  No crackles or wheezing.     Heart:    Normal S1 and S2, no murmur,  No JVD   Abdomen:     Normal bowel sounds, no masses, no organomegaly. Soft     nontender, no guarding, no rebound tenderness   Extremities:   Moves all extremities well, no edema, no cyanosis,    Skin:  Right suprapubic region has incision and drainage around the indurated abscessed area which is improving and the redness and cellulitis seems to be clinically also improving though there is about a 4 inch indurated area still present   Neurologic:   Grossly nonfocal and moves all extremities.        Pertinent Lab Results:     Results from last 7 days   Lab Units 04/29/23 0627 04/28/23 0457 04/27/23  1627   SODIUM mmol/L 138 136 132*   POTASSIUM mmol/L 3.9 3.2* 3.3*   CHLORIDE mmol/L 106 106 99   CO2 mmol/L 22.9 21.8* 17.9*   BUN mg/dL 10 15 25*   CREATININE mg/dL 0.62 0.69 1.16*   CALCIUM mg/dL 8.8 8.3* 9.4   BILIRUBIN mg/dL 0.3 0.4 0.5   ALK PHOS U/L 50 43 51   ALT (SGPT) U/L 9 9 11   AST (SGOT) U/L 25 23 26   GLUCOSE mg/dL 101* 87 105*     Results from last 7 days   Lab Units 04/29/23 0627 04/28/23 0457 04/27/23  1627   WBC 10*3/mm3 5.75 6.90 13.86*   HEMOGLOBIN g/dL 10.8* 10.1* 12.0   HEMATOCRIT % 33.2* 30.9* 35.6   PLATELETS 10*3/mm3 217 191 220         Blood Culture   Date Value Ref Range Status   04/27/2023 No growth  at 24 hours  Preliminary   04/27/2023 No growth at 24 hours  Preliminary     Wound Culture   Date Value Ref Range Status   04/25/2023 Light growth (2+) Staphylococcus aureus (A)  Final       Pertinent Radiology Results:    Imaging Results (Most Recent)     Procedure Component Value Units Date/Time    CT Abdomen Pelvis With Contrast [[id number]] Collected: 04/27/23 1920     Updated: 04/27/23 1925    Narrative:      PROCEDURE: CT ABDOMEN PELVIS W CONTRAST-     HISTORY: suprapubic/pelvic abscess     Comparison: September 13, 2016     FINDINGS: Axial CT images of the abdomen and pelvis were obtained with  IV contrast only. Coronal reformatted images were also obtained. This  study was performed with techniques to keep radiation doses as low as  reasonably achievable, (ALARA). Individualized dose reduction techniques  using automated exposure control or adjustment of mA and/or kV according  to the patient size were employed.     There is mild emphysema in the lung bases. The liver has an unremarkable  appearance, without evidence of mass. Postoperative changes are seen  from cholecystectomy. There is no evidence of biliary ductal dilatation.  The pancreas appears normal. The spleen size is within normal limits.  There is no evidence of renal mass or hydronephrosis. There is no  evidence of adenopathy. No abnormal fluid collection is seen. No  localized inflammatory process is identified. Severe vascular  calcifications are noted.     Images of the pelvis reveal multiple fluid-filled small bowel loops in a  nonspecific pattern. Multiple diverticula are noted in the sigmoid  colon. The appendix is unremarkable. Stranding is noted of the  suprapubic anterior subcutaneous fat system with edema or cellulitis. No  well-defined focal fluid collection is seen to suggest an abscess.  Several borderline sized inguinal nodes are seen.           Impression:      Suprapubic subcutaneous fat stranding consistent with edema  or  cellulitis. No well-defined abscess is identified.     Small amount of pelvic free fluid likely reactive.     Multiple fluid-filled small bowel loops in a nonspecific pattern but  could represent an enteritis.           This report was signed and finalized on 4/27/2023 7:23 PM by Pramod Baron MD.                  Discharge Disposition:      Home or Self Care    Discharge Medication:         Discharge Medications      Changes to Medications      Instructions Start Date   QUEtiapine 50 MG tablet  Commonly known as: SEROquel  What changed: Another medication with the same name was removed. Continue taking this medication, and follow the directions you see here.   50 mg, Oral, Nightly         Continue These Medications      Instructions Start Date   busPIRone 15 MG tablet  Commonly known as: BUSPAR   Take 2 tablets by mouth 2 (Two) Times a Day.      cetirizine 10 MG tablet  Commonly known as: zyrTEC   10 mg, Oral, Daily      DULoxetine 30 MG capsule  Commonly known as: CYMBALTA   30 mg, Oral, Daily      fenofibrate 160 MG tablet   TAKE 1 TABLET BY MOUTH DAILY      ferrous sulfate 325 (65 FE) MG tablet   TAKE 1 TABLET BY MOUTH TWICE DAILY      gabapentin 400 MG capsule  Commonly known as: NEURONTIN   400 mg, Oral, Nightly      ibuprofen 600 MG tablet  Commonly known as: ADVIL,MOTRIN   600 mg, Oral, Every 8 Hours PRN      levothyroxine 75 MCG tablet  Commonly known as: SYNTHROID, LEVOTHROID   75 mcg, Oral, Daily      pantoprazole 40 MG EC tablet  Commonly known as: PROTONIX   Take 1 tablet in the am 30 minutes before breakfast.      rosuvastatin 20 MG tablet  Commonly known as: CRESTOR   TAKE 1 TABLET BY MOUTH DAILY      Senna Plus 8.6-50 MG per capsule  Generic drug: Sennosides-Docusate Sodium   1 capsule, Oral, Daily      simethicone 80 MG chewable tablet  Commonly known as: MYLICON   80 mg, Oral, Every 6 Hours PRN      sulfamethoxazole-trimethoprim 800-160 MG per tablet  Commonly known as: BACTRIM DS,SEPTRA DS   1  tablet, Oral, 2 Times Daily      tiZANidine 4 MG tablet  Commonly known as: ZANAFLEX   8 mg, Oral, Nightly PRN      traZODone 50 MG tablet  Commonly known as: DESYREL   100 mg, Oral, Nightly      zolpidem 10 MG tablet  Commonly known as: AMBIEN   10 mg, Oral, Nightly PRN         Stop These Medications    docusate sodium 100 MG capsule  Commonly known as: COLACE     metoclopramide 5 MG tablet  Commonly known as: Reglan     multivitamin tablet tablet     polyethylene glycol 17 GM/SCOOP powder  Commonly known as: MIRALAX     pravastatin 40 MG tablet  Commonly known as: PRAVACHOL     sertraline 100 MG tablet  Commonly known as: ZOLOFT     Vitamin D3 1.25 MG (35321 UT) capsule            Discharge Diet:             Follow-up Appointments:    Follow-up on May 5 in the office Friday as scheduled      Test Results Pending at Discharge:      Pending Labs     Order Current Status    Blood Culture With TYRON - Blood, Arm, Left Preliminary result    Blood Culture With TYRON - Blood, Arm, Left Preliminary result             Ulises Coombs MD  04/29/23  10:57 EDT    Time:  Discharge 37 min    Please note that portions of this note were completed with a voice recognition program.

## 2023-04-29 NOTE — CASE MANAGEMENT/SOCIAL WORK
Case Management Discharge Note      Transportation Services  Private: Car    Final Discharge Disposition Code: 01 - home or self-care

## 2023-04-29 NOTE — PROGRESS NOTES
Adult Nutrition  Assessment/PES    Patient Name:  Leann Antonio  YOB: 1955  MRN: [id number]  Admit Date:  4/27/2023    Assessment Date:  4/29/2023    Comments:   Pt now receiving a regular diet with good PO intakes, currently averaging 75% x 4 meals. RD will monitor for PO adequacy to determine need for ONS. Will F/U and available PRN.       Reason for Assessment     Row Name 04/29/23 1041          Reason for Assessment    Reason For Assessment follow-up protocol                  Labs/Tests/Procedures/Meds     Row Name 04/29/23 1041          Labs/Procedures/Meds    Lab Results Reviewed reviewed, pertinent     Lab Results Comments Low: Alb; High: CRP        Medications    Pertinent Medications Reviewed reviewed, pertinent     Pertinent Medications Comments colace, protonix, KCl                Physical Findings     Row Name 04/29/23 1042          Physical Findings    Overall Physical Appearance normal weight for age using adjusted body weight for older adults, AIDA pubis wound/cellulitis, Sea score 22                  Nutrition Prescription Ordered     Row Name 04/29/23 1042          Nutrition Prescription PO    Current PO Diet Regular     Fluid Consistency Thin                Evaluation of Received Nutrient/Fluid Intake     Row Name 04/29/23 1042          Intake Assessment    Energy/Calorie Requirement Assessment meeting needs     Protein Requirement Assessment meeting needs     Fluid Requirement Assessment meeting needs        PO Evaluation    Number of Days PO Intake Evaluated 2 days     Number of Meals 4     % PO Intake 75                   Problem/Interventions:   Problem 1     Row Name 04/29/23 1043          Nutrition Diagnoses Problem 1    Problem 1 Increased Nutrient Needs     Macronutrient Protein     Etiology (related to) Medical Diagnosis     Signs/Symptoms (evidenced by) Other (comment)  AIDA pubis wound/cellulitis                      Intervention Goal     Row Name 04/29/23 1043           Intervention Goal    General Maintain nutrition;Meet nutritional needs for age/condition     PO Maintain intake;Tolerate PO;Meet estimated needs     Weight Maintain weight                Nutrition Intervention     Row Name 04/29/23 1043          Nutrition Intervention    RD/Tech Action Encourage intake;Follow Tx progress;Care plan reviewd     Recommended/Ordered --                Nutrition Prescription     Row Name 04/29/23 1045          Other Orders    Obtain Weight Daily     Obtain Weight Ordered? No, recommended     Supplement Vitamin mineral supplement     Supplement Ordered? No, recommended     Labs K+;Phos;Mg++;Na+     Labs Ordered? No, recommended                Education/Evaluation     Row Name 04/29/23 1048          Education    Education Will Instruct as appropriate        Monitor/Evaluation    Monitor Per protocol;PO intake;Weight;Skin status;Symptoms;Pertinent labs                 Electronically signed by:  Melissa Mcwilliams RD  04/29/23 10:45 EDT

## 2023-04-29 NOTE — PLAN OF CARE
Goal Outcome Evaluation:  Plan of Care Reviewed With: patient, family        Progress: no change  Outcome Evaluation: Vital signs stable on room air. Continue IV ABX for cellulitis of right groin. Pain managed with PRN pain medications. Pt. up ab obi independently and tolerates well. Daughter present at bedside. Potassium replaced. No s/s of acute distress noted.    Pt. Adequate and stable at this time per Dr. Coombs orders.

## 2023-04-30 NOTE — PAYOR COMM NOTE
"TO: [employer] REPLACEMENT  FROM: BERTRAM JANE RN  PH: [phone], FAX: [phone]  NPI: [id number]  TAX ID: [id number]  MEMBER ID: [id number]  MRN: [id number]      Leann Antonio (67 y.o. Female)     Date of Birth   1955    Social Security Number       Address   [address] [place], [place] [address]    Home Phone   [phone]    MRN   [id number]       Jew   Shinto    Marital Status                               Admission Date   4/27/23    Admission Type   Emergency    Admitting Provider   Ulises Coombs MD    Attending Provider       Department, Room/Bed   [facility] TELEMETRY 3, 321/1       Discharge Date   4/29/2023    Discharge Disposition   Home or Self Care    Discharge Destination   Home                            Attending Provider: (none)   Allergies: Penicillins    Isolation: None   Infection: None   Code Status: Prior    Ht: 160 cm (63\")   Wt: 76 kg (167 lb 8.8 oz)    Admission Cmt: None   Principal Problem: Cellulitis of suprapubic region [L03.319]                 Active Insurance as of 4/27/2023     Primary Coverage     Payor Plan Insurance Group Employer/Plan Group    [employer] MEDICARE REPLACEMENT [employer] MED[employer]ARE ADVANTAGE [id number]     Payor Plan Address Payor Plan Phone Number Payor Plan Fax Number Effective Dates    [address] [phone]  1/1/2021 - None Entered    [facility] [address]       Subscriber Name Subscriber Birth Date Member ID       LEANN ANTONIO 1955 [id number]           Secondary Coverage     Payor Plan Insurance Group Employer/Plan Group    [place]      Payor Plan Address Payor Plan Phone Number Payor Plan Fax Number Effective Dates    [address]   1/1/2014 - None Entered    [facility] [address]       Subscriber Name Subscriber Birth Date Member ID       LEANN ANTONIO 1955 [id number]                 Emergency Contacts      (Rel.) " Home Phone Work Phone Mobile Phone    Zabrina Lambert (Daughter) [phone] -- --               Discharge Summary      Ulises Coombs MD at 23 1057              [facility]   INTERNAL MEDICINE DISCHARGE SUMMARY      Name:  Leann Antonio   Age:  67 y.o.  Sex:  female  :  1955  MRN:  [id number]   Visit Number:  [id number]  Primary Care Physician:  Ulises Coombs MD  Date of Discharge:  2023  Admission Date:  2023      Discharge Diagnosis:         Cellulitis of suprapubic region    Depression    Hyperlipidemia    Hypothyroidism (acquired)          Consults:     Consults     No orders found from 3/29/2023 to 2023.          Procedures Performed:                 Hospital Course:   The patient was admitted on 2023  Please see H&P for details on admission HPI and ROS.  67-year-old patient with history of depression, hypothyroidism, hyperlipidemia, GERD, neuropathy, who came on  to the ER because of pain swelling and abscess over the suprapubic region.  She had I&D done and was discharged on oral Bactrim but got worse so she came to the ER again.  Upon evaluation she was noted to have cellulitis and has failed outpatient antibiotics so patient has been admitted for further IV antibiotic and under observation.  She states that she has been having pain and has been uncomfortable with walking.  There is no fever or chills.  There has been some serous drainage from the incision which has been done  Labs have been reviewed with the scan results as below    After admission she was started initially on vancomycin and aztreonam and based on the cultures it was Staph aureus and was switched over to Ancef.  Patient had white count of around 14,000 which has improved.  She has been having still serosanguineous drainage from the abscess and indurated area but is improved and the cellulitis is improving.  There is still induration noted and patient is able to walk better and  the soreness has improved.  So the patient prefers to go home and we will discharge her on oral Bactrim which she already has.  Patient will be closely followed up as an outpatient and I will see her in the office next Friday, May 5 which has already been scheduled    Vital Signs:     Temp:  [97.1 °F (36.2 °C)-98.3 °F (36.8 °C)] 97.8 °F (36.6 °C)  Heart Rate:  [68-73] 73  Resp:  [16-19] 18  BP: (116-142)/(55-68) 142/62    Physical Exam:     General Appearance:    Alert and cooperative, not in any acute distress.   Head:    Atraumatic and normocephalic, without obvious abnormality.   Eyes:            PERRLA,  No pallor. Extraocular movements are within normal limits.   Neck:   Supple,  No lymph glands, no bruit   Lungs:     Chest shape is normal. Breath sounds heard bilaterally equally.  No crackles or wheezing.     Heart:    Normal S1 and S2, no murmur,  No JVD   Abdomen:     Normal bowel sounds, no masses, no organomegaly. Soft     nontender, no guarding, no rebound tenderness   Extremities:   Moves all extremities well, no edema, no cyanosis,    Skin:  Right suprapubic region has incision and drainage around the indurated abscessed area which is improving and the redness and cellulitis seems to be clinically also improving though there is about a 4 inch indurated area still present   Neurologic:   Grossly nonfocal and moves all extremities.        Pertinent Lab Results:     Results from last 7 days   Lab Units 04/29/23 0627 04/28/23 0457 04/27/23  1627   SODIUM mmol/L 138 136 132*   POTASSIUM mmol/L 3.9 3.2* 3.3*   CHLORIDE mmol/L 106 106 99   CO2 mmol/L 22.9 21.8* 17.9*   BUN mg/dL 10 15 25*   CREATININE mg/dL 0.62 0.69 1.16*   CALCIUM mg/dL 8.8 8.3* 9.4   BILIRUBIN mg/dL 0.3 0.4 0.5   ALK PHOS U/L 50 43 51   ALT (SGPT) U/L 9 9 11   AST (SGOT) U/L 25 23 26   GLUCOSE mg/dL 101* 87 105*     Results from last 7 days   Lab Units 04/29/23 0627 04/28/23 0457 04/27/23  1627   WBC 10*3/mm3 5.75 6.90 13.86*    HEMOGLOBIN g/dL 10.8* 10.1* 12.0   HEMATOCRIT % 33.2* 30.9* 35.6   PLATELETS 10*3/mm3 217 191 220         Blood Culture   Date Value Ref Range Status   04/27/2023 No growth at 24 hours  Preliminary   04/27/2023 No growth at 24 hours  Preliminary     Wound Culture   Date Value Ref Range Status   04/25/2023 Light growth (2+) Staphylococcus aureus (A)  Final       Pertinent Radiology Results:    Imaging Results (Most Recent)     Procedure Component Value Units Date/Time    CT Abdomen Pelvis With Contrast [[id number]] Collected: 04/27/23 1920     Updated: 04/27/23 1925    Narrative:      PROCEDURE: CT ABDOMEN PELVIS W CONTRAST-     HISTORY: suprapubic/pelvic abscess     Comparison: September 13, 2016     FINDINGS: Axial CT images of the abdomen and pelvis were obtained with  IV contrast only. Coronal reformatted images were also obtained. This  study was performed with techniques to keep radiation doses as low as  reasonably achievable, (ALARA). Individualized dose reduction techniques  using automated exposure control or adjustment of mA and/or kV according  to the patient size were employed.     There is mild emphysema in the lung bases. The liver has an unremarkable  appearance, without evidence of mass. Postoperative changes are seen  from cholecystectomy. There is no evidence of biliary ductal dilatation.  The pancreas appears normal. The spleen size is within normal limits.  There is no evidence of renal mass or hydronephrosis. There is no  evidence of adenopathy. No abnormal fluid collection is seen. No  localized inflammatory process is identified. Severe vascular  calcifications are noted.     Images of the pelvis reveal multiple fluid-filled small bowel loops in a  nonspecific pattern. Multiple diverticula are noted in the sigmoid  colon. The appendix is unremarkable. Stranding is noted of the  suprapubic anterior subcutaneous fat system with edema or cellulitis. No  well-defined focal fluid collection is  seen to suggest an abscess.  Several borderline sized inguinal nodes are seen.           Impression:      Suprapubic subcutaneous fat stranding consistent with edema  or cellulitis. No well-defined abscess is identified.     Small amount of pelvic free fluid likely reactive.     Multiple fluid-filled small bowel loops in a nonspecific pattern but  could represent an enteritis.           This report was signed and finalized on 4/27/2023 7:23 PM by Pramod Baron MD.                  Discharge Disposition:      Home or Self Care    Discharge Medication:         Discharge Medications      Changes to Medications      Instructions Start Date   QUEtiapine 50 MG tablet  Commonly known as: SEROquel  What changed: Another medication with the same name was removed. Continue taking this medication, and follow the directions you see here.   50 mg, Oral, Nightly         Continue These Medications      Instructions Start Date   busPIRone 15 MG tablet  Commonly known as: BUSPAR   Take 2 tablets by mouth 2 (Two) Times a Day.      cetirizine 10 MG tablet  Commonly known as: zyrTEC   10 mg, Oral, Daily      DULoxetine 30 MG capsule  Commonly known as: CYMBALTA   30 mg, Oral, Daily      fenofibrate 160 MG tablet   TAKE 1 TABLET BY MOUTH DAILY      ferrous sulfate 325 (65 FE) MG tablet   TAKE 1 TABLET BY MOUTH TWICE DAILY      gabapentin 400 MG capsule  Commonly known as: NEURONTIN   400 mg, Oral, Nightly      ibuprofen 600 MG tablet  Commonly known as: ADVIL,MOTRIN   600 mg, Oral, Every 8 Hours PRN      levothyroxine 75 MCG tablet  Commonly known as: SYNTHROID, LEVOTHROID   75 mcg, Oral, Daily      pantoprazole 40 MG EC tablet  Commonly known as: PROTONIX   Take 1 tablet in the am 30 minutes before breakfast.      rosuvastatin 20 MG tablet  Commonly known as: CRESTOR   TAKE 1 TABLET BY MOUTH DAILY      Senna Plus 8.6-50 MG per capsule  Generic drug: Sennosides-Docusate Sodium   1 capsule, Oral, Daily      simethicone 80 MG chewable  tablet  Commonly known as: MYLICON   80 mg, Oral, Every 6 Hours PRN      sulfamethoxazole-trimethoprim 800-160 MG per tablet  Commonly known as: BACTRIM DS,SEPTRA DS   1 tablet, Oral, 2 Times Daily      tiZANidine 4 MG tablet  Commonly known as: ZANAFLEX   8 mg, Oral, Nightly PRN      traZODone 50 MG tablet  Commonly known as: DESYREL   100 mg, Oral, Nightly      zolpidem 10 MG tablet  Commonly known as: AMBIEN   10 mg, Oral, Nightly PRN         Stop These Medications    docusate sodium 100 MG capsule  Commonly known as: COLACE     metoclopramide 5 MG tablet  Commonly known as: Reglan     multivitamin tablet tablet     polyethylene glycol 17 GM/SCOOP powder  Commonly known as: MIRALAX     pravastatin 40 MG tablet  Commonly known as: PRAVACHOL     sertraline 100 MG tablet  Commonly known as: ZOLOFT     Vitamin D3 1.25 MG (19132 UT) capsule            Discharge Diet:             Follow-up Appointments:    Follow-up on May 5 in the office Friday as scheduled      Test Results Pending at Discharge:      Pending Labs     Order Current Status    Blood Culture With TYRON - Blood, Arm, Left Preliminary result    Blood Culture With TYRON - Blood, Arm, Left Preliminary result             Ulises Coombs MD  04/29/23  10:57 EDT    Time:  Discharge 37 min    Please note that portions of this note were completed with a voice recognition program.        Electronically signed by Ulises Coombs MD at 04/29/23 1100

## 2023-05-01 NOTE — PAYOR COMM NOTE
"To:  [facility]  From: Denia Bennett RN  Phone: [phone]  Fax: [phone]  NPI: [id number]  TIN: [id number]  Member ID: [id number]  MRN: [id number]    Leann Antonio (67 y.o. Female)       Date of Birth   1955    Social Security Number       Address   [address] [place] [address]    Home Phone   [phone]    MRN   [id number]       Adventist   Anabaptism    Marital Status                               Admission Date   4/27/23    Admission Type   Emergency    Admitting Provider   Ulises Coombs MD    Attending Provider       Department, Room/Bed   [facility] TELEMETRY 3, 321/1       Discharge Date   4/29/2023    Discharge Disposition   Home or Self Care    Discharge Destination   Home                              Attending Provider: (none)   Allergies: Penicillins    Isolation: None   Infection: None   Code Status: Prior    Ht: 160 cm (63\")   Wt: 76 kg (167 lb 8.8 oz)    Admission Cmt: None   Principal Problem: Cellulitis of suprapubic region [L03.319]                   Active Insurance as of 4/27/2023       Primary Coverage       Payor Plan Insurance Group Employer/Plan Group    ANTHEM MEDICARE REPLACEMENT ANTHEM MEDICARE ADVANTAGE [id number]       Payor Plan Address Payor Plan Phone Number Payor Plan Fax Number Effective Dates    [address] [phone]  1/1/2021 - None Entered    [facility] [id number]         Subscriber Name Subscriber Birth Date Member ID       LEANN ANTONIO 1955 [id number]               Secondary Coverage       Payor Plan Insurance Group Employer/Plan Group    [place]        Payor Plan Address Payor Plan Phone Number Payor Plan Fax Number Effective Dates    [address]   1/1/2014 - None Entered    [facility] [address]         Subscriber Name Subscriber Birth Date Member ID       LEANN ANTONIO 1955 [id number]                     Emergency Contacts        " (Rel.) Home Phone Work Phone Mobile Phone    Zabrina Lambert (Daughter) [phone] -- --                 Discharge Summary        Ulises Coombs MD at 23 1057              [facility]   INTERNAL MEDICINE DISCHARGE SUMMARY      Name:  Leann Antonio   Age:  67 y.o.  Sex:  female  :  1955  MRN:  [id number]   Visit Number:  [id number]  Primary Care Physician:  Ulises Coombs MD  Date of Discharge:  2023  Admission Date:  2023      Discharge Diagnosis:         Cellulitis of suprapubic region    Depression    Hyperlipidemia    Hypothyroidism (acquired)          Consults:     Consults       No orders found from 3/29/2023 to 2023.            Procedures Performed:                 Hospital Course:   The patient was admitted on 2023  Please see H&P for details on admission HPI and ROS.  67-year-old patient with history of depression, hypothyroidism, hyperlipidemia, GERD, neuropathy, who came on  to the ER because of pain swelling and abscess over the suprapubic region.  She had I&D done and was discharged on oral Bactrim but got worse so she came to the ER again.  Upon evaluation she was noted to have cellulitis and has failed outpatient antibiotics so patient has been admitted for further IV antibiotic and under observation.  She states that she has been having pain and has been uncomfortable with walking.  There is no fever or chills.  There has been some serous drainage from the incision which has been done  Labs have been reviewed with the scan results as below    After admission she was started initially on vancomycin and aztreonam and based on the cultures it was Staph aureus and was switched over to Ancef.  Patient had white count of around 14,000 which has improved.  She has been having still serosanguineous drainage from the abscess and indurated area but is improved and the cellulitis is improving.  There is still induration noted and patient is able to  walk better and the soreness has improved.  So the patient prefers to go home and we will discharge her on oral Bactrim which she already has.  Patient will be closely followed up as an outpatient and I will see her in the office next Friday, May 5 which has already been scheduled    Vital Signs:     Temp:  [97.1 °F (36.2 °C)-98.3 °F (36.8 °C)] 97.8 °F (36.6 °C)  Heart Rate:  [68-73] 73  Resp:  [16-19] 18  BP: (116-142)/(55-68) 142/62    Physical Exam:     General Appearance:    Alert and cooperative, not in any acute distress.   Head:    Atraumatic and normocephalic, without obvious abnormality.   Eyes:            PERRLA,  No pallor. Extraocular movements are within normal limits.   Neck:   Supple,  No lymph glands, no bruit   Lungs:     Chest shape is normal. Breath sounds heard bilaterally equally.  No crackles or wheezing.     Heart:    Normal S1 and S2, no murmur,  No JVD   Abdomen:     Normal bowel sounds, no masses, no organomegaly. Soft     nontender, no guarding, no rebound tenderness   Extremities:   Moves all extremities well, no edema, no cyanosis,    Skin:  Right suprapubic region has incision and drainage around the indurated abscessed area which is improving and the redness and cellulitis seems to be clinically also improving though there is about a 4 inch indurated area still present   Neurologic:   Grossly nonfocal and moves all extremities.        Pertinent Lab Results:     Results from last 7 days   Lab Units 04/29/23  0627 04/28/23  0457 04/27/23  1627   SODIUM mmol/L 138 136 132*   POTASSIUM mmol/L 3.9 3.2* 3.3*   CHLORIDE mmol/L 106 106 99   CO2 mmol/L 22.9 21.8* 17.9*   BUN mg/dL 10 15 25*   CREATININE mg/dL 0.62 0.69 1.16*   CALCIUM mg/dL 8.8 8.3* 9.4   BILIRUBIN mg/dL 0.3 0.4 0.5   ALK PHOS U/L 50 43 51   ALT (SGPT) U/L 9 9 11   AST (SGOT) U/L 25 23 26   GLUCOSE mg/dL 101* 87 105*     Results from last 7 days   Lab Units 04/29/23  0627 04/28/23  0457 04/27/23  1627   WBC 10*3/mm3 5.75 6.90  13.86*   HEMOGLOBIN g/dL 10.8* 10.1* 12.0   HEMATOCRIT % 33.2* 30.9* 35.6   PLATELETS 10*3/mm3 217 191 220         Blood Culture   Date Value Ref Range Status   04/27/2023 No growth at 24 hours  Preliminary   04/27/2023 No growth at 24 hours  Preliminary     Wound Culture   Date Value Ref Range Status   04/25/2023 Light growth (2+) Staphylococcus aureus (A)  Final       Pertinent Radiology Results:    Imaging Results (Most Recent)       Procedure Component Value Units Date/Time    CT Abdomen Pelvis With Contrast [[id number]] Collected: 04/27/23 1920     Updated: 04/27/23 1925    Narrative:      PROCEDURE: CT ABDOMEN PELVIS W CONTRAST-     HISTORY: suprapubic/pelvic abscess     Comparison: September 13, 2016     FINDINGS: Axial CT images of the abdomen and pelvis were obtained with  IV contrast only. Coronal reformatted images were also obtained. This  study was performed with techniques to keep radiation doses as low as  reasonably achievable, (ALARA). Individualized dose reduction techniques  using automated exposure control or adjustment of mA and/or kV according  to the patient size were employed.     There is mild emphysema in the lung bases. The liver has an unremarkable  appearance, without evidence of mass. Postoperative changes are seen  from cholecystectomy. There is no evidence of biliary ductal dilatation.  The pancreas appears normal. The spleen size is within normal limits.  There is no evidence of renal mass or hydronephrosis. There is no  evidence of adenopathy. No abnormal fluid collection is seen. No  localized inflammatory process is identified. Severe vascular  calcifications are noted.     Images of the pelvis reveal multiple fluid-filled small bowel loops in a  nonspecific pattern. Multiple diverticula are noted in the sigmoid  colon. The appendix is unremarkable. Stranding is noted of the  suprapubic anterior subcutaneous fat system with edema or cellulitis. No  well-defined focal fluid  collection is seen to suggest an abscess.  Several borderline sized inguinal nodes are seen.           Impression:      Suprapubic subcutaneous fat stranding consistent with edema  or cellulitis. No well-defined abscess is identified.     Small amount of pelvic free fluid likely reactive.     Multiple fluid-filled small bowel loops in a nonspecific pattern but  could represent an enteritis.           This report was signed and finalized on 4/27/2023 7:23 PM by Pramod Baron MD.                    Discharge Disposition:      Home or Self Care    Discharge Medication:         Discharge Medications        Changes to Medications        Instructions Start Date   QUEtiapine 50 MG tablet  Commonly known as: SEROquel  What changed: Another medication with the same name was removed. Continue taking this medication, and follow the directions you see here.   50 mg, Oral, Nightly             Continue These Medications        Instructions Start Date   busPIRone 15 MG tablet  Commonly known as: BUSPAR   Take 2 tablets by mouth 2 (Two) Times a Day.      cetirizine 10 MG tablet  Commonly known as: zyrTEC   10 mg, Oral, Daily      DULoxetine 30 MG capsule  Commonly known as: CYMBALTA   30 mg, Oral, Daily      fenofibrate 160 MG tablet   TAKE 1 TABLET BY MOUTH DAILY      ferrous sulfate 325 (65 FE) MG tablet   TAKE 1 TABLET BY MOUTH TWICE DAILY      gabapentin 400 MG capsule  Commonly known as: NEURONTIN   400 mg, Oral, Nightly      ibuprofen 600 MG tablet  Commonly known as: ADVIL,MOTRIN   600 mg, Oral, Every 8 Hours PRN      levothyroxine 75 MCG tablet  Commonly known as: SYNTHROID, LEVOTHROID   75 mcg, Oral, Daily      pantoprazole 40 MG EC tablet  Commonly known as: PROTONIX   Take 1 tablet in the am 30 minutes before breakfast.      rosuvastatin 20 MG tablet  Commonly known as: CRESTOR   TAKE 1 TABLET BY MOUTH DAILY      Senna Plus 8.6-50 MG per capsule  Generic drug: Sennosides-Docusate Sodium   1 capsule, Oral, Daily       simethicone 80 MG chewable tablet  Commonly known as: MYLICON   80 mg, Oral, Every 6 Hours PRN      sulfamethoxazole-trimethoprim 800-160 MG per tablet  Commonly known as: BACTRIM DS,SEPTRA DS   1 tablet, Oral, 2 Times Daily      tiZANidine 4 MG tablet  Commonly known as: ZANAFLEX   8 mg, Oral, Nightly PRN      traZODone 50 MG tablet  Commonly known as: DESYREL   100 mg, Oral, Nightly      zolpidem 10 MG tablet  Commonly known as: AMBIEN   10 mg, Oral, Nightly PRN             Stop These Medications      docusate sodium 100 MG capsule  Commonly known as: COLACE     metoclopramide 5 MG tablet  Commonly known as: Reglan     multivitamin tablet tablet     polyethylene glycol 17 GM/SCOOP powder  Commonly known as: MIRALAX     pravastatin 40 MG tablet  Commonly known as: PRAVACHOL     sertraline 100 MG tablet  Commonly known as: ZOLOFT     Vitamin D3 1.25 MG (16835 UT) capsule              Discharge Diet:             Follow-up Appointments:    Follow-up on May 5 in the office Friday as scheduled      Test Results Pending at Discharge:      Pending Labs       Order Current Status    Blood Culture With TYRON - Blood, Arm, Left Preliminary result    Blood Culture With TYRON - Blood, Arm, Left Preliminary result               Ulises Coombs MD  04/29/23  10:57 EDT    Time:  Discharge 37 min    Please note that portions of this note were completed with a voice recognition program.        Electronically signed by Ulises Coombs MD at 04/29/23 1100

## 2023-05-02 LAB
BACTERIA SPEC AEROBE CULT: NORMAL
BACTERIA SPEC AEROBE CULT: NORMAL

## 2023-06-12 ENCOUNTER — APPOINTMENT (OUTPATIENT)
Dept: CT IMAGING | Facility: HOSPITAL | Age: 68
End: 2023-06-12
Payer: MEDICARE

## 2023-06-12 ENCOUNTER — HOSPITAL ENCOUNTER (EMERGENCY)
Facility: HOSPITAL | Age: 68
Discharge: HOME | End: 2023-06-13
Attending: EMERGENCY MEDICINE
Payer: MEDICARE

## 2023-06-12 ENCOUNTER — APPOINTMENT (OUTPATIENT)
Dept: GENERAL RADIOLOGY | Facility: HOSPITAL | Age: 68
End: 2023-06-12
Payer: MEDICARE

## 2023-06-12 DIAGNOSIS — R09.1 PLEURISY: Primary | ICD-10-CM

## 2023-06-12 LAB
ALBUMIN SERPL-MCNC: 4.4 G/DL (ref 3.5–5.2)
ALBUMIN/GLOB SERPL: 1.4 G/DL
ALP SERPL-CCNC: 51 U/L (ref 39–117)
ALT SERPL W P-5'-P-CCNC: 7 U/L (ref 1–33)
ANION GAP SERPL CALCULATED.3IONS-SCNC: 16.1 MMOL/L (ref 5–15)
AST SERPL-CCNC: 21 U/L (ref 1–32)
BASOPHILS # BLD AUTO: 0.03 10*3/MM3 (ref 0–0.2)
BASOPHILS NFR BLD AUTO: 0.5 % (ref 0–1.5)
BILIRUB SERPL-MCNC: 0.5 MG/DL (ref 0–1.2)
BUN SERPL-MCNC: 18 MG/DL (ref 8–23)
BUN/CREAT SERPL: 25 (ref 7–25)
CALCIUM SPEC-SCNC: 9.9 MG/DL (ref 8.6–10.5)
CHLORIDE SERPL-SCNC: 103 MMOL/L (ref 98–107)
CO2 SERPL-SCNC: 19.9 MMOL/L (ref 22–29)
CREAT SERPL-MCNC: 0.72 MG/DL (ref 0.57–1)
DEPRECATED RDW RBC AUTO: 49.3 FL (ref 37–54)
EGFRCR SERPLBLD CKD-EPI 2021: 92.3 ML/MIN/1.73
EOSINOPHIL # BLD AUTO: 0.13 10*3/MM3 (ref 0–0.4)
EOSINOPHIL NFR BLD AUTO: 2 % (ref 0.3–6.2)
ERYTHROCYTE [DISTWIDTH] IN BLOOD BY AUTOMATED COUNT: 14.7 % (ref 12.3–15.4)
GLOBULIN UR ELPH-MCNC: 3.1 GM/DL
GLUCOSE SERPL-MCNC: 90 MG/DL (ref 65–99)
HCT VFR BLD AUTO: 36.3 % (ref 34–46.6)
HGB BLD-MCNC: 12.2 G/DL (ref 12–15.9)
HOLD SPECIMEN: NORMAL
HOLD SPECIMEN: NORMAL
IMM GRANULOCYTES # BLD AUTO: 0.01 10*3/MM3 (ref 0–0.05)
IMM GRANULOCYTES NFR BLD AUTO: 0.2 % (ref 0–0.5)
LYMPHOCYTES # BLD AUTO: 2.61 10*3/MM3 (ref 0.7–3.1)
LYMPHOCYTES NFR BLD AUTO: 40.2 % (ref 19.6–45.3)
MCH RBC QN AUTO: 30.3 PG (ref 26.6–33)
MCHC RBC AUTO-ENTMCNC: 33.6 G/DL (ref 31.5–35.7)
MCV RBC AUTO: 90.3 FL (ref 79–97)
MONOCYTES # BLD AUTO: 0.52 10*3/MM3 (ref 0.1–0.9)
MONOCYTES NFR BLD AUTO: 8 % (ref 5–12)
NEUTROPHILS NFR BLD AUTO: 3.19 10*3/MM3 (ref 1.7–7)
NEUTROPHILS NFR BLD AUTO: 49.1 % (ref 42.7–76)
NRBC BLD AUTO-RTO: 0 /100 WBC (ref 0–0.2)
PLATELET # BLD AUTO: 264 10*3/MM3 (ref 140–450)
PMV BLD AUTO: 11.5 FL (ref 6–12)
POTASSIUM SERPL-SCNC: 3.7 MMOL/L (ref 3.5–5.2)
PROT SERPL-MCNC: 7.5 G/DL (ref 6–8.5)
RBC # BLD AUTO: 4.02 10*6/MM3 (ref 3.77–5.28)
SODIUM SERPL-SCNC: 139 MMOL/L (ref 136–145)
TROPONIN T SERPL HS-MCNC: 8 NG/L
WBC NRBC COR # BLD: 6.49 10*3/MM3 (ref 3.4–10.8)
WHOLE BLOOD HOLD COAG: NORMAL
WHOLE BLOOD HOLD SPECIMEN: NORMAL

## 2023-06-12 PROCEDURE — 84484 ASSAY OF TROPONIN QUANT: CPT | Performed by: EMERGENCY MEDICINE

## 2023-06-12 PROCEDURE — 93005 ELECTROCARDIOGRAM TRACING: CPT | Performed by: EMERGENCY MEDICINE

## 2023-06-12 PROCEDURE — 80053 COMPREHEN METABOLIC PANEL: CPT | Performed by: EMERGENCY MEDICINE

## 2023-06-12 PROCEDURE — 85025 COMPLETE CBC W/AUTO DIFF WBC: CPT | Performed by: EMERGENCY MEDICINE

## 2023-06-12 PROCEDURE — 25010000002 MORPHINE PER 10 MG: Performed by: EMERGENCY MEDICINE

## 2023-06-12 PROCEDURE — 25510000001 IOPAMIDOL 61 % SOLUTION: Performed by: EMERGENCY MEDICINE

## 2023-06-12 PROCEDURE — 71045 X-RAY EXAM CHEST 1 VIEW: CPT

## 2023-06-12 PROCEDURE — 71275 CT ANGIOGRAPHY CHEST: CPT

## 2023-06-12 PROCEDURE — 93005 ELECTROCARDIOGRAM TRACING: CPT

## 2023-06-12 PROCEDURE — 25010000002 ONDANSETRON PER 1 MG: Performed by: PHYSICIAN ASSISTANT

## 2023-06-12 RX ORDER — SODIUM CHLORIDE 0.9 % (FLUSH) 0.9 %
10 SYRINGE (ML) INJECTION AS NEEDED
Status: DISCONTINUED | OUTPATIENT
Start: 2023-06-12 | End: 2023-06-13 | Stop reason: HOSPADM

## 2023-06-12 RX ORDER — ASPIRIN 325 MG
325 TABLET ORAL ONCE
Status: COMPLETED | OUTPATIENT
Start: 2023-06-12 | End: 2023-06-12

## 2023-06-12 RX ORDER — ONDANSETRON 2 MG/ML
4 INJECTION INTRAMUSCULAR; INTRAVENOUS ONCE
Status: COMPLETED | OUTPATIENT
Start: 2023-06-12 | End: 2023-06-12

## 2023-06-12 RX ADMIN — MORPHINE SULFATE 4 MG: 4 INJECTION, SOLUTION INTRAMUSCULAR; INTRAVENOUS at 22:23

## 2023-06-12 RX ADMIN — IOPAMIDOL 100 ML: 612 INJECTION, SOLUTION INTRAVENOUS at 23:06

## 2023-06-12 RX ADMIN — ASPIRIN 325 MG: 325 TABLET ORAL at 22:23

## 2023-06-12 RX ADMIN — ONDANSETRON 4 MG: 2 INJECTION INTRAMUSCULAR; INTRAVENOUS at 22:23

## 2023-06-13 VITALS
TEMPERATURE: 97.6 F | BODY MASS INDEX: 27.11 KG/M2 | HEIGHT: 63 IN | RESPIRATION RATE: 24 BRPM | OXYGEN SATURATION: 95 % | HEART RATE: 60 BPM | WEIGHT: 153 LBS | SYSTOLIC BLOOD PRESSURE: 154 MMHG | DIASTOLIC BLOOD PRESSURE: 74 MMHG

## 2023-06-13 LAB
GEN 5 2HR TROPONIN T REFLEX: 8 NG/L
TROPONIN T DELTA: 0 NG/L

## 2023-06-13 PROCEDURE — 84484 ASSAY OF TROPONIN QUANT: CPT | Performed by: EMERGENCY MEDICINE

## 2023-06-13 RX ORDER — NITROGLYCERIN 0.4 MG/1
0.4 TABLET SUBLINGUAL
Status: DISCONTINUED | OUTPATIENT
Start: 2023-06-13 | End: 2023-06-13 | Stop reason: HOSPADM

## 2023-06-13 RX ORDER — PREDNISONE 20 MG/1
20 TABLET ORAL 2 TIMES DAILY
Qty: 10 TABLET | Refills: 0 | Status: SHIPPED | OUTPATIENT
Start: 2023-06-13 | End: 2023-06-18

## 2023-06-13 RX ADMIN — NITROGLYCERIN 0.4 MG: 0.4 TABLET SUBLINGUAL at 00:43

## 2023-06-13 NOTE — ED PROVIDER NOTES
Subjective  History of Present Illness:    Chief Complaint:   Chief Complaint   Patient presents with    Chest Pain      History of Present Illness: Leann Antonio is a 67 y.o. female who presents to the emergency department complaining of chest pain, pain with a deep breath.  67-year-old female with history depression, GERD, hyperlipidemia and panic attacks comes in for evaluation of above complaint.  She denies any known cardiac history.  No history of lung disease.  She does not smoke cigarettes but does vape.  She states acutely an hour prior to arrival she had onset of left-sided chest pain and pain with a deep breath.  She rates it an 8 out of 10.  Called her daughter and brought her to the ER to be evaluated.  No personal history of DVT or PE  Onset: 1 hour prior to arrival  Duration: Ongoing  Exacerbating / Alleviating factors: None  Associated symptoms: Nausea, diaphoresis      Nurses Notes reviewed and agree, including vitals, allergies, social history and prior medical history.     Review of Systems   Constitutional:  Positive for diaphoresis.   HENT: Negative.     Eyes: Negative.    Respiratory:  Positive for shortness of breath.         Pain with a deep breath   Cardiovascular:  Positive for chest pain.   Gastrointestinal:  Positive for nausea.   Genitourinary: Negative.    Musculoskeletal: Negative.    Skin: Negative.    Neurological: Negative.    Psychiatric/Behavioral: Negative.       Past Medical History:   Diagnosis Date    Depression     GERD (gastroesophageal reflux disease)     Hyperlipidemia     Panic attacks        Allergies:    Penicillins      Past Surgical History:   Procedure Laterality Date    CHOLECYSTECTOMY      HYSTERECTOMY           Social History     Socioeconomic History    Marital status:    Tobacco Use    Smoking status: Former     Packs/day: 0.50     Types: Cigarettes    Smokeless tobacco: Never   Vaping Use    Vaping Use: Every day   Substance and Sexual Activity     "Alcohol use: No    Drug use: No    Sexual activity: Defer         Family History   Problem Relation Age of Onset    Cirrhosis Mother     Cirrhosis Brother     Breast cancer Sister     Colon cancer Neg Hx        Objective  Physical Exam:  /74   Pulse 60   Temp 97.6 °F (36.4 °C) (Oral)   Resp 24   Ht 160 cm (63\")   Wt 69.4 kg (153 lb)   SpO2 95%   BMI 27.10 kg/m²      Physical Exam  Vitals and nursing note reviewed.   Constitutional:       General: She is not in acute distress.     Appearance: She is well-developed and normal weight. She is not ill-appearing, toxic-appearing or diaphoretic.   HENT:      Head: Normocephalic and atraumatic.   Eyes:      Extraocular Movements: Extraocular movements intact.   Cardiovascular:      Rate and Rhythm: Normal rate and regular rhythm.      Heart sounds: Normal heart sounds.   Pulmonary:      Effort: Pulmonary effort is normal.      Breath sounds: Normal breath sounds. No decreased breath sounds, wheezing, rhonchi or rales.   Abdominal:      Palpations: Abdomen is soft.   Musculoskeletal:         General: Normal range of motion.      Cervical back: Normal range of motion.      Right lower leg: No tenderness. No edema.      Left lower leg: No tenderness. No edema.   Skin:     General: Skin is warm and dry.   Neurological:      General: No focal deficit present.      Mental Status: She is alert.   Psychiatric:         Mood and Affect: Mood normal.         Behavior: Behavior normal.         Procedures    ED Course:    ED Course as of 06/17/23 2307   Mon Jun 12, 2023   2250 HS Troponin T: 8 [TM]   2341 HS Troponin T: 8 [TM]   2343 EKG interpreted by me.  Sinus rhythm.  Rate of 81.  Significant artifact present.  No obvious ST or T wave changes.  Poor quality EKG without obvious ischemic changes [CG]      ED Course User Index  [CG] Benito Bar DO  [TM] Kiko Roque PA-C       Lab Results (last 24 hours)       ** No results found for the last 24 hours. " **             No radiology results from the last 24 hrs       Medical Decision Making  Problems Addressed:  Pleurisy: complicated acute illness or injury    Amount and/or Complexity of Data Reviewed  Labs: ordered. Decision-making details documented in ED Course.  Radiology: ordered.  ECG/medicine tests: ordered.    Risk  OTC drugs.  Prescription drug management.        Leann Antonio is a 67 y.o. female who presents to the emergency department for evaluation of chest pain    Differential diagnosis includes PE, ACS, pleurisy, pneumothorax, pneumonia among other etiologies.    CBC, CMP, troponin, chest x-ray, CT scan PE protocol, EKG ordered for further evaluation of the patient's presentation.    Chart review if available included outside testing, previous visits, prior labs, prior imaging, available notes from prior evaluations or visits with specialists, medication list, allergies, past medical history, past surgical history when applicable.    Patient was treated with morphine and nitroglycerin    Morphine took patient's pain from an 8 to a 6, will trial nitroglycerin.  Awaiting repeat troponin    Plan for disposition is pending, transition of care to Dr. Bar.  Patient/family comfortable with and understanding of the plan.      Final diagnoses:   Pleurisy          Kiko Roque PA-C  06/17/23 5743

## 2023-07-04 ENCOUNTER — HOSPITAL ENCOUNTER (INPATIENT)
Facility: HOSPITAL | Age: 68
LOS: 3 days | Discharge: HOME | DRG: 194 | End: 2023-07-08
Attending: EMERGENCY MEDICINE | Admitting: INTERNAL MEDICINE
Payer: MEDICARE

## 2023-07-04 ENCOUNTER — APPOINTMENT (OUTPATIENT)
Dept: GENERAL RADIOLOGY | Facility: HOSPITAL | Age: 68
DRG: 194 | End: 2023-07-04
Payer: MEDICARE

## 2023-07-04 ENCOUNTER — APPOINTMENT (OUTPATIENT)
Dept: CT IMAGING | Facility: HOSPITAL | Age: 68
DRG: 194 | End: 2023-07-04
Payer: MEDICARE

## 2023-07-04 DIAGNOSIS — J96.01 ACUTE RESPIRATORY FAILURE WITH HYPOXIA: ICD-10-CM

## 2023-07-04 DIAGNOSIS — J18.9 PNEUMONIA OF BOTH LOWER LOBES DUE TO INFECTIOUS ORGANISM: Primary | ICD-10-CM

## 2023-07-04 DIAGNOSIS — R93.89 ABNORMAL CT OF THE CHEST: ICD-10-CM

## 2023-07-04 LAB
ALBUMIN SERPL-MCNC: 3 G/DL (ref 3.5–5.2)
ALBUMIN/GLOB SERPL: 0.8 G/DL
ALP SERPL-CCNC: 95 U/L (ref 39–117)
ALT SERPL W P-5'-P-CCNC: 12 U/L (ref 1–33)
ANION GAP SERPL CALCULATED.3IONS-SCNC: 15.5 MMOL/L (ref 5–15)
AST SERPL-CCNC: 35 U/L (ref 1–32)
B PARAPERT DNA SPEC QL NAA+PROBE: NOT DETECTED
B PERT DNA SPEC QL NAA+PROBE: NOT DETECTED
BASOPHILS # BLD AUTO: 0.02 10*3/MM3 (ref 0–0.2)
BASOPHILS NFR BLD AUTO: 0.2 % (ref 0–1.5)
BILIRUB SERPL-MCNC: 0.6 MG/DL (ref 0–1.2)
BUN SERPL-MCNC: 8 MG/DL (ref 8–23)
BUN/CREAT SERPL: 16.7 (ref 7–25)
C PNEUM DNA NPH QL NAA+NON-PROBE: NOT DETECTED
CALCIUM SPEC-SCNC: 9.1 MG/DL (ref 8.6–10.5)
CHLORIDE SERPL-SCNC: 98 MMOL/L (ref 98–107)
CO2 SERPL-SCNC: 21.5 MMOL/L (ref 22–29)
CREAT SERPL-MCNC: 0.48 MG/DL (ref 0.57–1)
DEPRECATED RDW RBC AUTO: 45.6 FL (ref 37–54)
EGFRCR SERPLBLD CKD-EPI 2021: 104.6 ML/MIN/1.73
EOSINOPHIL # BLD AUTO: 0.18 10*3/MM3 (ref 0–0.4)
EOSINOPHIL NFR BLD AUTO: 2 % (ref 0.3–6.2)
ERYTHROCYTE [DISTWIDTH] IN BLOOD BY AUTOMATED COUNT: 14.2 % (ref 12.3–15.4)
FLUAV SUBTYP SPEC NAA+PROBE: NOT DETECTED
FLUBV RNA ISLT QL NAA+PROBE: NOT DETECTED
GEN 5 2HR TROPONIN T REFLEX: 11 NG/L
GLOBULIN UR ELPH-MCNC: 3.6 GM/DL
GLUCOSE SERPL-MCNC: 98 MG/DL (ref 65–99)
HADV DNA SPEC NAA+PROBE: NOT DETECTED
HCOV 229E RNA SPEC QL NAA+PROBE: NOT DETECTED
HCOV HKU1 RNA SPEC QL NAA+PROBE: NOT DETECTED
HCOV NL63 RNA SPEC QL NAA+PROBE: NOT DETECTED
HCOV OC43 RNA SPEC QL NAA+PROBE: NOT DETECTED
HCT VFR BLD AUTO: 33.5 % (ref 34–46.6)
HGB BLD-MCNC: 11.2 G/DL (ref 12–15.9)
HMPV RNA NPH QL NAA+NON-PROBE: NOT DETECTED
HOLD SPECIMEN: NORMAL
HOLD SPECIMEN: NORMAL
HPIV1 RNA ISLT QL NAA+PROBE: NOT DETECTED
HPIV2 RNA SPEC QL NAA+PROBE: NOT DETECTED
HPIV3 RNA NPH QL NAA+PROBE: NOT DETECTED
HPIV4 P GENE NPH QL NAA+PROBE: NOT DETECTED
IMM GRANULOCYTES # BLD AUTO: 0.05 10*3/MM3 (ref 0–0.05)
IMM GRANULOCYTES NFR BLD AUTO: 0.5 % (ref 0–0.5)
LYMPHOCYTES # BLD AUTO: 1.18 10*3/MM3 (ref 0.7–3.1)
LYMPHOCYTES NFR BLD AUTO: 12.8 % (ref 19.6–45.3)
M PNEUMO IGG SER IA-ACNC: NOT DETECTED
MAGNESIUM SERPL-MCNC: 1.6 MG/DL (ref 1.6–2.4)
MCH RBC QN AUTO: 29.4 PG (ref 26.6–33)
MCHC RBC AUTO-ENTMCNC: 33.4 G/DL (ref 31.5–35.7)
MCV RBC AUTO: 87.9 FL (ref 79–97)
MONOCYTES # BLD AUTO: 0.48 10*3/MM3 (ref 0.1–0.9)
MONOCYTES NFR BLD AUTO: 5.2 % (ref 5–12)
NEUTROPHILS NFR BLD AUTO: 7.28 10*3/MM3 (ref 1.7–7)
NEUTROPHILS NFR BLD AUTO: 79.3 % (ref 42.7–76)
NRBC BLD AUTO-RTO: 0 /100 WBC (ref 0–0.2)
NT-PROBNP SERPL-MCNC: 992.4 PG/ML (ref 0–900)
PLATELET # BLD AUTO: 547 10*3/MM3 (ref 140–450)
PMV BLD AUTO: 10.2 FL (ref 6–12)
POTASSIUM SERPL-SCNC: 2.8 MMOL/L (ref 3.5–5.2)
PROT SERPL-MCNC: 6.6 G/DL (ref 6–8.5)
RBC # BLD AUTO: 3.81 10*6/MM3 (ref 3.77–5.28)
RHINOVIRUS RNA SPEC NAA+PROBE: NOT DETECTED
RSV RNA NPH QL NAA+NON-PROBE: NOT DETECTED
SARS-COV-2 RNA NPH QL NAA+NON-PROBE: NOT DETECTED
SODIUM SERPL-SCNC: 135 MMOL/L (ref 136–145)
TROPONIN T DELTA: -3 NG/L
TROPONIN T SERPL HS-MCNC: 14 NG/L
WBC NRBC COR # BLD: 9.19 10*3/MM3 (ref 3.4–10.8)
WHOLE BLOOD HOLD COAG: NORMAL
WHOLE BLOOD HOLD SPECIMEN: NORMAL

## 2023-07-04 PROCEDURE — 25010000002 METHYLPREDNISOLONE PER 125 MG

## 2023-07-04 PROCEDURE — 83880 ASSAY OF NATRIURETIC PEPTIDE: CPT

## 2023-07-04 PROCEDURE — 80053 COMPREHEN METABOLIC PANEL: CPT | Performed by: EMERGENCY MEDICINE

## 2023-07-04 PROCEDURE — 71275 CT ANGIOGRAPHY CHEST: CPT

## 2023-07-04 PROCEDURE — 99285 EMERGENCY DEPT VISIT HI MDM: CPT

## 2023-07-04 PROCEDURE — 93005 ELECTROCARDIOGRAM TRACING: CPT | Performed by: EMERGENCY MEDICINE

## 2023-07-04 PROCEDURE — 83735 ASSAY OF MAGNESIUM: CPT

## 2023-07-04 PROCEDURE — 84484 ASSAY OF TROPONIN QUANT: CPT | Performed by: EMERGENCY MEDICINE

## 2023-07-04 PROCEDURE — 71045 X-RAY EXAM CHEST 1 VIEW: CPT

## 2023-07-04 PROCEDURE — 36415 COLL VENOUS BLD VENIPUNCTURE: CPT

## 2023-07-04 PROCEDURE — 25010000002 ONDANSETRON PER 1 MG

## 2023-07-04 PROCEDURE — 85025 COMPLETE CBC W/AUTO DIFF WBC: CPT | Performed by: EMERGENCY MEDICINE

## 2023-07-04 PROCEDURE — 0202U NFCT DS 22 TRGT SARS-COV-2: CPT

## 2023-07-04 PROCEDURE — 84145 PROCALCITONIN (PCT): CPT

## 2023-07-04 PROCEDURE — 25010000002 MAGNESIUM SULFATE 2 GM/50ML SOLUTION

## 2023-07-04 RX ORDER — SODIUM CHLORIDE 0.9 % (FLUSH) 0.9 %
10 SYRINGE (ML) INJECTION AS NEEDED
Status: DISCONTINUED | OUTPATIENT
Start: 2023-07-04 | End: 2023-07-08 | Stop reason: HOSPADM

## 2023-07-04 RX ORDER — POTASSIUM CHLORIDE 750 MG/1
40 CAPSULE, EXTENDED RELEASE ORAL ONCE
Status: COMPLETED | OUTPATIENT
Start: 2023-07-04 | End: 2023-07-04

## 2023-07-04 RX ORDER — ONDANSETRON 2 MG/ML
4 INJECTION INTRAMUSCULAR; INTRAVENOUS ONCE
Status: COMPLETED | OUTPATIENT
Start: 2023-07-04 | End: 2023-07-04

## 2023-07-04 RX ORDER — METHYLPREDNISOLONE SODIUM SUCCINATE 125 MG/2ML
125 INJECTION, POWDER, LYOPHILIZED, FOR SOLUTION INTRAMUSCULAR; INTRAVENOUS ONCE
Status: COMPLETED | OUTPATIENT
Start: 2023-07-04 | End: 2023-07-04

## 2023-07-04 RX ORDER — MAGNESIUM SULFATE HEPTAHYDRATE 40 MG/ML
2 INJECTION, SOLUTION INTRAVENOUS ONCE
Status: COMPLETED | OUTPATIENT
Start: 2023-07-04 | End: 2023-07-05

## 2023-07-04 RX ORDER — ASPIRIN 325 MG
325 TABLET ORAL ONCE
Status: COMPLETED | OUTPATIENT
Start: 2023-07-04 | End: 2023-07-04

## 2023-07-04 RX ORDER — ALBUTEROL SULFATE 90 UG/1
6 AEROSOL, METERED RESPIRATORY (INHALATION) ONCE
Status: COMPLETED | OUTPATIENT
Start: 2023-07-04 | End: 2023-07-04

## 2023-07-04 RX ADMIN — POTASSIUM CHLORIDE 40 MEQ: 10 CAPSULE, COATED, EXTENDED RELEASE ORAL at 23:18

## 2023-07-04 RX ADMIN — ALBUTEROL SULFATE 6 PUFF: 90 AEROSOL, METERED RESPIRATORY (INHALATION) at 22:11

## 2023-07-04 RX ADMIN — METHYLPREDNISOLONE SODIUM SUCCINATE 125 MG: 125 INJECTION, POWDER, FOR SOLUTION INTRAMUSCULAR; INTRAVENOUS at 23:14

## 2023-07-04 RX ADMIN — MAGNESIUM SULFATE HEPTAHYDRATE 2 G: 40 INJECTION, SOLUTION INTRAVENOUS at 23:16

## 2023-07-04 RX ADMIN — ASPIRIN 325 MG: 325 TABLET ORAL at 20:41

## 2023-07-04 RX ADMIN — ONDANSETRON 4 MG: 2 INJECTION INTRAMUSCULAR; INTRAVENOUS at 22:28

## 2023-07-05 PROBLEM — J96.01 ACUTE RESPIRATORY FAILURE WITH HYPOXIA: Status: ACTIVE | Noted: 2023-07-05

## 2023-07-05 PROBLEM — J18.9 BILATERAL PNEUMONIA: Status: ACTIVE | Noted: 2023-07-05

## 2023-07-05 LAB
D-LACTATE SERPL-SCNC: 1 MMOL/L (ref 0.5–2)
PROCALCITONIN SERPL-MCNC: 0.12 NG/ML (ref 0–0.25)

## 2023-07-05 PROCEDURE — 94640 AIRWAY INHALATION TREATMENT: CPT

## 2023-07-05 PROCEDURE — 94761 N-INVAS EAR/PLS OXIMETRY MLT: CPT

## 2023-07-05 PROCEDURE — 87040 BLOOD CULTURE FOR BACTERIA: CPT

## 2023-07-05 PROCEDURE — 94799 UNLISTED PULMONARY SVC/PX: CPT

## 2023-07-05 PROCEDURE — 25510000001 IOPAMIDOL 61 % SOLUTION: Performed by: STUDENT IN AN ORGANIZED HEALTH CARE EDUCATION/TRAINING PROGRAM

## 2023-07-05 PROCEDURE — 25010000002 CEFTRIAXONE SODIUM-DEXTROSE 1-3.74 GM-%(50ML) RECONSTITUTED SOLUTION

## 2023-07-05 PROCEDURE — 83605 ASSAY OF LACTIC ACID: CPT

## 2023-07-05 PROCEDURE — 25010000002 ENOXAPARIN PER 10 MG: Performed by: INTERNAL MEDICINE

## 2023-07-05 PROCEDURE — 25010000002 KETOROLAC TROMETHAMINE PER 15 MG

## 2023-07-05 PROCEDURE — 25010000002 METHYLPREDNISOLONE PER 40 MG: Performed by: INTERNAL MEDICINE

## 2023-07-05 PROCEDURE — 25010000002 AZITHROMYCIN PER 500 MG

## 2023-07-05 RX ORDER — PANTOPRAZOLE SODIUM 40 MG/1
40 TABLET, DELAYED RELEASE ORAL
Status: DISCONTINUED | OUTPATIENT
Start: 2023-07-05 | End: 2023-07-08 | Stop reason: HOSPADM

## 2023-07-05 RX ORDER — BUSPIRONE HYDROCHLORIDE 15 MG/1
30 TABLET ORAL 2 TIMES DAILY
Status: DISCONTINUED | OUTPATIENT
Start: 2023-07-05 | End: 2023-07-08 | Stop reason: HOSPADM

## 2023-07-05 RX ORDER — LEVOTHYROXINE SODIUM 0.07 MG/1
75 TABLET ORAL DAILY
Status: DISCONTINUED | OUTPATIENT
Start: 2023-07-05 | End: 2023-07-08 | Stop reason: HOSPADM

## 2023-07-05 RX ORDER — GABAPENTIN 400 MG/1
400 CAPSULE ORAL EVERY 12 HOURS SCHEDULED
Status: DISCONTINUED | OUTPATIENT
Start: 2023-07-05 | End: 2023-07-08 | Stop reason: HOSPADM

## 2023-07-05 RX ORDER — ROSUVASTATIN CALCIUM 20 MG/1
20 TABLET, COATED ORAL DAILY
Status: DISCONTINUED | OUTPATIENT
Start: 2023-07-05 | End: 2023-07-08 | Stop reason: HOSPADM

## 2023-07-05 RX ORDER — KETOROLAC TROMETHAMINE 30 MG/ML
15 INJECTION, SOLUTION INTRAMUSCULAR; INTRAVENOUS ONCE
Status: COMPLETED | OUTPATIENT
Start: 2023-07-05 | End: 2023-07-05

## 2023-07-05 RX ORDER — DULOXETIN HYDROCHLORIDE 30 MG/1
30 CAPSULE, DELAYED RELEASE ORAL DAILY
Status: DISCONTINUED | OUTPATIENT
Start: 2023-07-05 | End: 2023-07-08 | Stop reason: HOSPADM

## 2023-07-05 RX ORDER — ZOLPIDEM TARTRATE 5 MG/1
5 TABLET ORAL NIGHTLY PRN
Status: DISCONTINUED | OUTPATIENT
Start: 2023-07-05 | End: 2023-07-08 | Stop reason: HOSPADM

## 2023-07-05 RX ORDER — POTASSIUM CHLORIDE 1.5 G/1.58G
20 POWDER, FOR SOLUTION ORAL
Status: DISCONTINUED | OUTPATIENT
Start: 2023-07-05 | End: 2023-07-06

## 2023-07-05 RX ORDER — ENOXAPARIN SODIUM 100 MG/ML
40 INJECTION SUBCUTANEOUS DAILY
Status: DISCONTINUED | OUTPATIENT
Start: 2023-07-05 | End: 2023-07-08 | Stop reason: HOSPADM

## 2023-07-05 RX ORDER — FENOFIBRATE 145 MG/1
145 TABLET, COATED ORAL DAILY
Status: DISCONTINUED | OUTPATIENT
Start: 2023-07-05 | End: 2023-07-08 | Stop reason: HOSPADM

## 2023-07-05 RX ORDER — AMOXICILLIN 250 MG
1 CAPSULE ORAL 2 TIMES DAILY
Status: DISCONTINUED | OUTPATIENT
Start: 2023-07-05 | End: 2023-07-08 | Stop reason: HOSPADM

## 2023-07-05 RX ORDER — CEFTRIAXONE 1 G/50ML
1 INJECTION, SOLUTION INTRAVENOUS ONCE
Status: COMPLETED | OUTPATIENT
Start: 2023-07-05 | End: 2023-07-05

## 2023-07-05 RX ORDER — TRAZODONE HYDROCHLORIDE 50 MG/1
100 TABLET ORAL NIGHTLY
Status: DISCONTINUED | OUTPATIENT
Start: 2023-07-05 | End: 2023-07-08 | Stop reason: HOSPADM

## 2023-07-05 RX ORDER — CETIRIZINE HYDROCHLORIDE 10 MG/1
10 TABLET ORAL DAILY
Status: DISCONTINUED | OUTPATIENT
Start: 2023-07-05 | End: 2023-07-08 | Stop reason: HOSPADM

## 2023-07-05 RX ORDER — IPRATROPIUM BROMIDE AND ALBUTEROL SULFATE 2.5; .5 MG/3ML; MG/3ML
3 SOLUTION RESPIRATORY (INHALATION)
Status: DISCONTINUED | OUTPATIENT
Start: 2023-07-05 | End: 2023-07-07

## 2023-07-05 RX ORDER — CEFTRIAXONE 1 G/50ML
1 INJECTION, SOLUTION INTRAVENOUS EVERY 24 HOURS
Status: COMPLETED | OUTPATIENT
Start: 2023-07-06 | End: 2023-07-08

## 2023-07-05 RX ORDER — METHYLPREDNISOLONE SODIUM SUCCINATE 40 MG/ML
40 INJECTION, POWDER, LYOPHILIZED, FOR SOLUTION INTRAMUSCULAR; INTRAVENOUS 2 TIMES DAILY
Status: DISCONTINUED | OUTPATIENT
Start: 2023-07-05 | End: 2023-07-06

## 2023-07-05 RX ADMIN — KETOROLAC TROMETHAMINE 15 MG: 30 INJECTION, SOLUTION INTRAMUSCULAR; INTRAVENOUS at 01:47

## 2023-07-05 RX ADMIN — IPRATROPIUM BROMIDE AND ALBUTEROL SULFATE 3 ML: .5; 3 SOLUTION RESPIRATORY (INHALATION) at 06:57

## 2023-07-05 RX ADMIN — GABAPENTIN 400 MG: 400 CAPSULE ORAL at 21:25

## 2023-07-05 RX ADMIN — CEFTRIAXONE 1 G: 1 INJECTION, SOLUTION INTRAVENOUS at 03:00

## 2023-07-05 RX ADMIN — IPRATROPIUM BROMIDE AND ALBUTEROL SULFATE 3 ML: .5; 3 SOLUTION RESPIRATORY (INHALATION) at 19:24

## 2023-07-05 RX ADMIN — POTASSIUM CHLORIDE 20 MEQ: 1.5 POWDER, FOR SOLUTION ORAL at 11:39

## 2023-07-05 RX ADMIN — FENOFIBRATE 145 MG: 145 TABLET, FILM COATED ORAL at 10:00

## 2023-07-05 RX ADMIN — IPRATROPIUM BROMIDE AND ALBUTEROL SULFATE 3 ML: .5; 3 SOLUTION RESPIRATORY (INHALATION) at 12:09

## 2023-07-05 RX ADMIN — METHYLPREDNISOLONE SODIUM SUCCINATE 40 MG: 40 INJECTION, POWDER, FOR SOLUTION INTRAMUSCULAR; INTRAVENOUS at 08:23

## 2023-07-05 RX ADMIN — SENNOSIDES AND DOCUSATE SODIUM 1 TABLET: 50; 8.6 TABLET ORAL at 21:24

## 2023-07-05 RX ADMIN — TRAZODONE HYDROCHLORIDE 100 MG: 50 TABLET ORAL at 20:30

## 2023-07-05 RX ADMIN — POTASSIUM CHLORIDE 20 MEQ: 1.5 POWDER, FOR SOLUTION ORAL at 17:06

## 2023-07-05 RX ADMIN — IOPAMIDOL 100 ML: 612 INJECTION, SOLUTION INTRAVENOUS at 00:00

## 2023-07-05 RX ADMIN — ZOLPIDEM TARTRATE 5 MG: 5 TABLET ORAL at 20:39

## 2023-07-05 RX ADMIN — CETIRIZINE HYDROCHLORIDE 10 MG: 10 TABLET, FILM COATED ORAL at 09:53

## 2023-07-05 RX ADMIN — LEVOTHYROXINE SODIUM 75 MCG: 75 TABLET ORAL at 09:53

## 2023-07-05 RX ADMIN — ROSUVASTATIN CALCIUM 20 MG: 20 TABLET, FILM COATED ORAL at 09:53

## 2023-07-05 RX ADMIN — SODIUM CHLORIDE 500 MG: 900 INJECTION, SOLUTION INTRAVENOUS at 04:04

## 2023-07-05 RX ADMIN — METHYLPREDNISOLONE SODIUM SUCCINATE 40 MG: 40 INJECTION, POWDER, FOR SOLUTION INTRAMUSCULAR; INTRAVENOUS at 20:30

## 2023-07-05 RX ADMIN — ENOXAPARIN SODIUM 40 MG: 100 INJECTION SUBCUTANEOUS at 09:53

## 2023-07-05 RX ADMIN — BUSPIRONE HYDROCHLORIDE 30 MG: 15 TABLET ORAL at 09:53

## 2023-07-05 RX ADMIN — PANTOPRAZOLE SODIUM 40 MG: 40 TABLET, DELAYED RELEASE ORAL at 09:53

## 2023-07-05 RX ADMIN — DULOXETINE 30 MG: 30 CAPSULE, DELAYED RELEASE ORAL at 09:53

## 2023-07-05 RX ADMIN — BUSPIRONE HYDROCHLORIDE 30 MG: 15 TABLET ORAL at 20:30

## 2023-07-05 NOTE — PLAN OF CARE
Goal Outcome Evaluation:  Plan of Care Reviewed With: patient        Progress: no change  Outcome Evaluation: Patient is a new admission to the floor. Vital signs stable and will continue to monitor. No overnight events to report. [place] consult for the morning.

## 2023-07-05 NOTE — PLAN OF CARE
Problem: Adult Inpatient Plan of Care  Goal: Absence of Hospital-Acquired Illness or Injury  Intervention: Identify and Manage Fall Risk  Recent Flowsheet Documentation  Taken 7/5/2023 1600 by Myriam Moffett RN  Safety Promotion/Fall Prevention:   activity supervised   assistive device/personal items within reach   fall prevention program maintained   gait belt   lighting adjusted   clutter free environment maintained   nonskid shoes/slippers when out of bed   room organization consistent   safety round/check completed  Taken 7/5/2023 1400 by Myriam Moffett RN  Safety Promotion/Fall Prevention:   activity supervised   assistive device/personal items within reach   clutter free environment maintained   fall prevention program maintained   lighting adjusted   gait belt   nonskid shoes/slippers when out of bed   room organization consistent   safety round/check completed  Taken 7/5/2023 1200 by Myriam Moffett RN  Safety Promotion/Fall Prevention:   activity supervised   assistive device/personal items within reach   clutter free environment maintained   fall prevention program maintained   gait belt   lighting adjusted   nonskid shoes/slippers when out of bed   room organization consistent   safety round/check completed  Intervention: Prevent Skin Injury  Recent Flowsheet Documentation  Taken 7/5/2023 1600 by Myriam Moffett RN  Body Position: dangle, side of bed  Taken 7/5/2023 1200 by Myriam Moffett RN  Body Position:   turned   right   sitting up in bed  Intervention: Prevent and Manage VTE (Venous Thromboembolism) Risk  Recent Flowsheet Documentation  Taken 7/5/2023 1600 by Myriam Moffett RN  Activity Management: activity encouraged  Taken 7/5/2023 1400 by Myriam Moffett RN  Activity Management: up ad obi  Taken 7/5/2023 1200 by Myriam Moffett RN  Activity Management: activity encouraged  Intervention: Prevent Infection  Recent Flowsheet Documentation  Taken 7/5/2023 1600 by Myriam Moffett RN  Infection Prevention:    environmental surveillance performed   single patient room provided  Taken 7/5/2023 1400 by Myriam Moffett RN  Infection Prevention:   environmental surveillance performed   single patient room provided  Taken 7/5/2023 1200 by Myriam Moffett RN  Infection Prevention:   environmental surveillance performed   single patient room provided  Goal: Optimal Comfort and Wellbeing  Intervention: Provide Person-Centered Care  Recent Flowsheet Documentation  Taken 7/5/2023 0800 by Myriam Moffett RN  Trust Relationship/Rapport:   care explained   thoughts/feelings acknowledged   reassurance provided   questions encouraged   questions answered     Problem: Fall Injury Risk  Goal: Absence of Fall and Fall-Related Injury  Intervention: Identify and Manage Contributors  Recent Flowsheet Documentation  Taken 7/5/2023 1600 by Myriam Moffett RN  Medication Review/Management: medications reviewed  Taken 7/5/2023 1400 by Myriam Moffett RN  Medication Review/Management: medications reviewed  Taken 7/5/2023 1200 by Myriam Moffett RN  Medication Review/Management: medications reviewed  Intervention: Promote Injury-Free Environment  Recent Flowsheet Documentation  Taken 7/5/2023 1600 by Myriam Moffett RN  Safety Promotion/Fall Prevention:   activity supervised   assistive device/personal items within reach   fall prevention program maintained   gait belt   lighting adjusted   clutter free environment maintained   nonskid shoes/slippers when out of bed   room organization consistent   safety round/check completed  Taken 7/5/2023 1400 by Myriam Moffett RN  Safety Promotion/Fall Prevention:   activity supervised   assistive device/personal items within reach   clutter free environment maintained   fall prevention program maintained   lighting adjusted   gait belt   nonskid shoes/slippers when out of bed   room organization consistent   safety round/check completed  Taken 7/5/2023 1200 by Myriam Moffett RN  Safety Promotion/Fall Prevention:   activity  supervised   assistive device/personal items within reach   clutter free environment maintained   fall prevention program maintained   gait belt   lighting adjusted   nonskid shoes/slippers when out of bed   room organization consistent   safety round/check completed   Goal Outcome Evaluation:  Plan of Care Reviewed With: patient        Progress: improving  Outcome Evaluation: Patient has sat up in chair some today. Ambulated around room and to bathroom a few times. Will continue to monitor.

## 2023-07-05 NOTE — H&P
[facility]   HISTORY AND PHYSICAL      Name:  Leann Antonio   Age:  67 y.o.  Sex:  female  :  1955  MRN:  [id number]   Visit Number:  [id number]  Admission Date:  2023  Date Of Service:  23  Primary Care Physician:  Ulises Coombs MD     Admitting diagnosis:      Acute respiratory failure with hypoxia    Depression    Hyperlipidemia    Hypothyroidism (acquired)    Bilateral pneumonia  Hypokalemia      History Of Presenting Illness:      67-year-old patient with past medical history of hypothyroidism, hyperlipidemia, depression, comes into the ER because of shortness of breath and chronic cough which has been ongoing for the last few weeks.  She was treated as outpatient with antibiotics and has not improved.  In the ER work-up done showed that she was hypoxic on room air saturation dropped to 85 at the same time CT scan scan was done and it showed and confirmed bilateral pneumonia along with underlying COPD and possible fibrotic changes which is new.  Patient denies having major fever and work-up done showed lactic acid is normal with procalcitonin being 0.12  Labs and the CT scan results have been reviewed as below  In the ER she was started on IV Rocephin and Zithromax and bronchodilators with steroids and is being further admitted for management of hypoxic respiratory failure with bilateral pneumonia    Review Of Systems:     The following systems were reviewed and negative;  constitution, eyes, ENT, respiratory, cardiovascular, gastrointestinal, genitourinary, musculoskeletal, neurological and behavioral/psych,  Skin except as above.     Past Medical History:    Past Medical History:   Diagnosis Date   • Depression    • GERD (gastroesophageal reflux disease)    • Hyperlipidemia    • Panic attacks    • Pleurisy        Past Surgical history:    Past Surgical History:   Procedure Laterality Date   • CHOLECYSTECTOMY     • HYSTERECTOMY         Social  History:    Social History     Socioeconomic History   • Marital status:    Tobacco Use   • Smoking status: Former     Packs/day: 0.50     Types: Cigarettes   • Smokeless tobacco: Never   Vaping Use   • Vaping Use: Every day   • Substances: Nicotine   Substance and Sexual Activity   • Alcohol use: No   • Drug use: No   • Sexual activity: Defer       Family History:    Family History   Problem Relation Age of Onset   • Cirrhosis Mother    • Cirrhosis Brother    • Breast cancer Sister    • Colon cancer Neg Hx          Allergies:      Penicillins    Home Medications:    Prior to Admission Medications     Prescriptions Last Dose Informant Patient Reported? Taking?    busPIRone (BUSPAR) 15 MG tablet 7/4/2023  Yes Yes    Take 2 tablets by mouth 2 (Two) Times a Day.    cetirizine (zyrTEC) 10 MG tablet 7/4/2023  Yes Yes    Take 1 tablet by mouth Daily.    DULoxetine (CYMBALTA) 30 MG capsule 7/4/2023  Yes Yes    Take 1 capsule by mouth Daily.    fenofibrate 160 MG tablet 7/4/2023  Yes Yes    TAKE 1 TABLET BY MOUTH DAILY    ferrous sulfate 325 (65 FE) MG tablet 7/4/2023  Yes Yes    TAKE 1 TABLET BY MOUTH TWICE DAILY    gabapentin (NEURONTIN) 400 MG capsule Past Week  Yes Yes    Take 1 capsule by mouth Every Night.    ibuprofen (ADVIL,MOTRIN) 600 MG tablet 7/4/2023  Yes Yes    Take 1 tablet by mouth Every 8 (Eight) Hours As Needed for Mild Pain.    levothyroxine (SYNTHROID, LEVOTHROID) 75 MCG tablet 7/4/2023  Yes Yes    Take 1 tablet by mouth Daily.    pantoprazole (PROTONIX) 40 MG EC tablet 7/4/2023  No Yes    Take 1 tablet in the am 30 minutes before breakfast.    QUEtiapine (SEROquel) 50 MG tablet 7/4/2023  Yes Yes    Take 1 tablet by mouth Every Night.    rosuvastatin (CRESTOR) 20 MG tablet 7/4/2023  Yes Yes    TAKE 1 TABLET BY MOUTH DAILY    Sennosides-Docusate Sodium (Senna Plus) 8.6-50 MG per capsule 7/4/2023  Yes Yes    Take 1 capsule by mouth Daily.    simethicone (MYLICON) 80 MG chewable tablet Unknown  Yes No     Chew 1 tablet Every 6 (Six) Hours As Needed for Flatulence.    tiZANidine (ZANAFLEX) 4 MG tablet 7/4/2023  Yes Yes    Take 2 tablets by mouth At Night As Needed for Muscle Spasms.    traZODone (DESYREL) 50 MG tablet 7/4/2023  Yes Yes    Take 2 tablets by mouth Every Night.    zolpidem (AMBIEN) 10 MG tablet 7/4/2023  Yes Yes    Take 1 tablet by mouth At Night As Needed for Sleep.                 Vital Signs:    Temp:  [97.7 °F (36.5 °C)-98 °F (36.7 °C)] 97.7 °F (36.5 °C)  Heart Rate:  [68-83] 83  Resp:  [16] 16  BP: (150-170)/(58-91) 150/70        07/04/23 2007 07/05/23 0314   Weight: 72.6 kg (160 lb) 68.9 kg (151 lb 14.4 oz)       Body mass index is 26.91 kg/m².    Physical Exam:      General Appearance:    Alert and cooperative,  And lying comfortably in bed   Head:    Atraumatic and normocephalic, without obvious abnormality.   Eyes:            PERRLA, conjunctivae and sclerae normal, no Icterus. No pallor. Extraocular movements are within normal limits.   Ears:    Ears appear intact with no abnormalities noted.   Throat:   No oral lesions, no thrush, oral mucosa moist.   Neck:   Supple, trachea midline, no thyromegaly, no carotid bruit, no lymphadenopathy   Lungs:    Breath sounds heard bilaterally equally.  Very fine crepitations on the basis       Heart:    Normal S1 and S2, no murmur, no gallop, no rub. No JVD   Abdomen:     Normal bowel sounds, no masses, no organomegaly. Soft   nontender, nondistended, no guarding, no rebound    tenderness   Extremities:   Moves all extremities well, no edema, no cyanosis, no          clubbing   Skin:   No  bruising or rash   Neurologic:   Cranial nerves 2 - 12 grossly intact, sensation intact, Motor power is normal and equal bilaterally.       EKG:      Sinus rhythm on the monitor    Labs:    Lab Results (last 24 hours)     Procedure Component Value Units Date/Time    Lactic Acid, Plasma [[id number]]  (Normal) Collected: 07/05/23 0215    Specimen: Blood Updated:  "07/05/23 0238     Lactate 1.0 mmol/L     Blood Culture With TYRON - Blood, Hand, Right [[id number]] Collected: 07/05/23 0217    Specimen: Blood from Hand, Right Updated: 07/05/23 0222    Blood Culture With TYRON - Blood, Arm, Right [[id number]] Collected: 07/05/23 0215    Specimen: Blood from Arm, Right Updated: 07/05/23 0222    Procalcitonin [[id number]]  (Normal) Collected: 07/04/23 2020    Specimen: Blood Updated: 07/05/23 0213     Procalcitonin 0.12 ng/mL     Narrative:      As a Marker for Sepsis (Non-Neonates):    1. <0.5 ng/mL represents a low risk of severe sepsis and/or septic shock.  2. >2 ng/mL represents a high risk of severe sepsis and/or septic shock.    As a Marker for Lower Respiratory Tract Infections that require antibiotic therapy:    PCT on Admission    Antibiotic Therapy       6-12 Hrs later    >0.5                Strongly Recommended  >0.25 - <0.5        Recommended   0.1 - 0.25          Discouraged              Remeasure/reassess PCT  <0.1                Strongly Discouraged     Remeasure/reassess PCT    As 28 day mortality risk marker: \"Change in Procalcitonin Result\" (>80% or <=80%) if Day 0 (or Day 1) and Day 4 values are available. Refer to http://www.[employer]fat[employer]-pct-calculator.com    Change in PCT <=80%  A decrease of PCT levels below or equal to 80% defines a positive change in PCT test result representing a higher risk for 28-day all-cause mortality of patients diagnosed with severe sepsis for septic shock.    Change in PCT >80%  A decrease of PCT levels of more than 80% defines a negative change in PCT result representing a lower risk for 28-day all-cause mortality of patients diagnosed with severe sepsis or septic shock.       Respiratory Panel PCR w/COVID-19(SARS-CoV-2) JEFFERY/LEONIDAS/LIS/PAD/COR/MAD/PARVEZ In-House, NP Swab in UTM/VTM, 3-4 HR TAT - Swab, Nasopharynx [[id number]]  (Normal) Collected: 07/04/23 2206    Specimen: Swab from Nasopharynx Updated: 07/04/23 5731     ADENOVIRUS, PCR Not Detected    "  Coronavirus 229E Not Detected     Coronavirus HKU1 Not Detected     Coronavirus NL63 Not Detected     Coronavirus OC43 Not Detected     COVID19 Not Detected     Human Metapneumovirus Not Detected     Human Rhinovirus/Enterovirus Not Detected     Influenza A PCR Not Detected     Influenza B PCR Not Detected     Parainfluenza Virus 1 Not Detected     Parainfluenza Virus 2 Not Detected     Parainfluenza Virus 3 Not Detected     Parainfluenza Virus 4 Not Detected     RSV, PCR Not Detected     Bordetella pertussis pcr Not Detected     Bordetella parapertussis PCR Not Detected     Chlamydophila pneumoniae PCR Not Detected     Mycoplasma pneumo by PCR Not Detected    Narrative:      In the setting of a positive respiratory panel with a viral infection PLUS a negative procalcitonin without other underlying concern for bacterial infection, consider observing off antibiotics or discontinuation of antibiotics and continue supportive care. If the respiratory panel is positive for atypical bacterial infection (Bordetella pertussis, Chlamydophila pneumoniae, or Mycoplasma pneumoniae), consider antibiotic de-escalation to target atypical bacterial infection.    High Sensitivity Troponin T 2Hr [[id number]]  (Abnormal) Collected: 07/04/23 2212    Specimen: Blood Updated: 07/04/23 2237     HS Troponin T 11 ng/L      Troponin T Delta -3 ng/L     Narrative:      High Sensitive Troponin T Reference Range:  <10.0 ng/L- Negative Female for AMI  <15.0 ng/L- Negative Male for AMI  >=10 - Abnormal Female indicating possible myocardial injury.  >=15 - Abnormal Male indicating possible myocardial injury.   Clinicians would have to utilize clinical acumen, EKG, Troponin, and serial changes to determine if it is an Acute Myocardial Infarction or myocardial injury due to an underlying chronic condition.         BNP [[id number]]  (Abnormal) Collected: 07/04/23 2020    Specimen: Blood Updated: 07/04/23 2148     proBNP 992.4 pg/mL     Narrative:       Among patients with dyspnea, NT-proBNP is highly sensitive for the detection of acute congestive heart failure. In addition NT-proBNP of <300 pg/ml effectively rules out acute congestive heart failure with 99% negative predictive value.    Results may be falsely decreased if patient taking Biotin.      Magnesium [[id number]]  (Normal) Collected: 07/04/23 2020    Specimen: Blood Updated: 07/04/23 2136     Magnesium 1.6 mg/dL     [place] Draw [[id number]] Collected: 07/04/23 2020    Specimen: Blood Updated: 07/04/23 2131    Narrative:      The following orders were created for panel order [place] Draw.  Procedure                               Abnormality         Status                     ---------                               -----------         ------                     Green Top (Gel)[[id number]]                                  Final result               Lavender Top[[id number]]                                     Final result               Gold Top - SST[[id number]]                                   Final result               Light Blue Top[[id number]]                                   Final result                 Please view results for these tests on the individual orders.    Gold Top - SST [[id number]] Collected: 07/04/23 2020    Specimen: Blood Updated: 07/04/23 2131     Extra Tube Hold for add-ons.     Comment: Auto resulted.       Light Blue Top [[id number]] Collected: 07/04/23 2020    Specimen: Blood Updated: 07/04/23 2131     Extra Tube Hold for add-ons.     Comment: Auto resulted       Green Top (Gel) [[id number]] Collected: 07/04/23 2020    Specimen: Blood Updated: 07/04/23 2131     Extra Tube Hold for add-ons.     Comment: Auto resulted.       Lavender Top [[id number]] Collected: 07/04/23 2020    Specimen: Blood Updated: 07/04/23 2131     Extra Tube hold for add-on     Comment: Auto resulted       High Sensitivity Troponin T [[id number]]  (Abnormal) Collected: 07/04/23 2020    Specimen: Blood Updated: 07/04/23  2049     HS Troponin T 14 ng/L     Narrative:      High Sensitive Troponin T Reference Range:  <10.0 ng/L- Negative Female for AMI  <15.0 ng/L- Negative Male for AMI  >=10 - Abnormal Female indicating possible myocardial injury.  >=15 - Abnormal Male indicating possible myocardial injury.   Clinicians would have to utilize clinical acumen, EKG, Troponin, and serial changes to determine if it is an Acute Myocardial Infarction or myocardial injury due to an underlying chronic condition.         Comprehensive Metabolic Panel [[id number]]  (Abnormal) Collected: 07/04/23 2020    Specimen: Blood Updated: 07/04/23 2048     Glucose 98 mg/dL      BUN 8 mg/dL      Creatinine 0.48 mg/dL      Sodium 135 mmol/L      Potassium 2.8 mmol/L      Chloride 98 mmol/L      CO2 21.5 mmol/L      Calcium 9.1 mg/dL      Total Protein 6.6 g/dL      Albumin 3.0 g/dL      ALT (SGPT) 12 U/L      AST (SGOT) 35 U/L      Alkaline Phosphatase 95 U/L      Total Bilirubin 0.6 mg/dL      Globulin 3.6 gm/dL      A/G Ratio 0.8 g/dL      BUN/Creatinine Ratio 16.7     Anion Gap 15.5 mmol/L      eGFR 104.6 mL/min/1.73     Narrative:      GFR Normal >60  Chronic Kidney Disease <60  Kidney Failure <15      CBC & Differential [[id number]]  (Abnormal) Collected: 07/04/23 2020    Specimen: Blood Updated: 07/04/23 2028    Narrative:      The following orders were created for panel order CBC & Differential.  Procedure                               Abnormality         Status                     ---------                               -----------         ------                     CBC Auto Differential[[id number]]        Abnormal            Final result                 Please view results for these tests on the individual orders.    CBC Auto Differential [[id number]]  (Abnormal) Collected: 07/04/23 2020    Specimen: Blood Updated: 07/04/23 2028     WBC 9.19 10*3/mm3      RBC 3.81 10*6/mm3      Hemoglobin 11.2 g/dL      Hematocrit 33.5 %      MCV 87.9 fL      MCH 29.4  pg      MCHC 33.4 g/dL      RDW 14.2 %      RDW-SD 45.6 fl      MPV 10.2 fL      Platelets 547 10*3/mm3      Neutrophil % 79.3 %      Lymphocyte % 12.8 %      Monocyte % 5.2 %      Eosinophil % 2.0 %      Basophil % 0.2 %      Immature Grans % 0.5 %      Neutrophils, Absolute 7.28 10*3/mm3      Lymphocytes, Absolute 1.18 10*3/mm3      Monocytes, Absolute 0.48 10*3/mm3      Eosinophils, Absolute 0.18 10*3/mm3      Basophils, Absolute 0.02 10*3/mm3      Immature Grans, Absolute 0.05 10*3/mm3      nRBC 0.0 /100 WBC           Radiology:    Imaging Results (Last 72 Hours)     Procedure Component Value Units Date/Time    XR Chest 1 View [[id number]] Collected: 07/05/23 0742     Updated: 07/05/23 0746    Narrative:      PROCEDURE: XR CHEST 1 VW-     INDICATION:  Chest Pain Triage Protocol     FINDINGS:  A portable view of the chest was obtained.  Comparison is  made to a prior exam dated 06/12/2023.   Heart size is normal. There are  new bilateral upper lobe opacities, left greater than right concerning  for pneumonia. A component of cavitation is not excluded on the left.  There are small bilateral pleural effusions. No pneumothorax.       Impression:      New bilateral upper lobe opacities concerning for  pneumonia. Cavitation is not excluded on the left. Recommend  radiographic follow-up to resolution.     This report was signed and finalized on 7/5/2023 7:44 AM by Christa Cruz MD.    CT Angiogram Chest Pulmonary Embolism [[id number]] Collected: 07/05/23 0143     Updated: 07/05/23 0145    Narrative:      FINAL REPORT    TECHNIQUE:  null    CLINICAL HISTORY:  Hypoxia at rest, abnormal CXR    COMPARISON:  null    FINDINGS:  CTA chest    Comparison: 06/12/2023.    Findings:    Full study including information received at 12:40 a.m. CDT 07/05/2023.    2.4 cm right and 2.2 cm left main pulmonary artery mild dilatation unchanged may be related to pulmonary arterial hypertension. No pulmonary embolism seen. Thoracic aortic  atherosclerosis with no aneurysm or dissection. Coronary artery atherosclerosis.   Old calcific granulomatous disease. Mild-to-moderate mediastinal and mild left-greater-than-right hilar lymphadenopathy increased. Small bilateral pleural effusions new compared to prior study. Centrilobular emphysema. Increased cystic changes at apices.   Other subpleural small cystic changes including mid lower lungs as previous. This probably represent superimposed fibrosis. Ill-defined consolidation and groundglass attenuation bilaterally particularly upper lobes not seen on prior study. Overall   findings suggest atypical pneumonia. Slight bilateral basilar dependent densities probably atelectasis. Clinical correlation and follow-up to resolution recommended. No pneumothorax. Mild apparent thickening of esophageal wall particularly upper mid may   be mildly increased however collapsed nonspecific could be artifactual but can not exclude mild esophagitis. Cholecystectomy. Heterogeneity of spleen probably arterial phase related artifact. If clinically indicated, multiphasic CT abdomen follow-up   recommended. Slight thickening left adrenal gland unchanged.      Impression:      Impression:    1. New bilateral ill-defined consolidation and groundglass attenuation deleted particularly upper lobes, small pleural effusions, and lymphadenopathy probably related to pneumonia. Clinical correlation and follow-up to resolution recommended.    2. Underlying centrilobular emphysema and fibrosis.    3. No PE seen.    Authenticated and Electronically Signed by Madhu Kramer MD on  07/05/2023 01:43:20 AM          Assessment:    Assessment & Plan       Acute respiratory failure with hypoxia    Depression    Hyperlipidemia    Hypothyroidism (acquired)    Bilateral pneumonia        Plan:     1.  Acute hypoxic respiratory failure-this seems to be secondary to pneumonia as well as possible underlying interstitial lung disease.  Will consult to   Sia to for further expert evaluation and recommendations and see if bronchoscopy needed    2.  Bilateral pneumonia-we will cover her with Rocephin and Zithromax at present and follow-up    3.  COPD exacerbation-bronchodilators with IV steroids to be given for 48 hours    4.  Hypokalemia potassium is 2.8 and will replace as per protocol    We will continue her other medications for her medical problems and goals of treatment and plan of care has been addressed with the patient in details    Ulises Coombs MD  07/05/23  09:33 EDT    Please note that portions of this note were completed with a voice recognition program.

## 2023-07-05 NOTE — CASE MANAGEMENT/SOCIAL WORK
Discharge Planning Assessment  [facility]     Patient Name: Leann Antonio  MRN: [id number]  Today's Date: 7/5/2023    Admit Date: 7/4/2023    Plan: DCP   Discharge Needs Assessment       Row Name 07/05/23 1257       Living Environment    People in Home alone    Current Living Arrangements apartment    Potentially Unsafe Housing Conditions none    Primary Care Provided by self    Provides Primary Care For no one    Family Caregiver if Needed child(marielle), adult;friend(s)    Quality of Family Relationships involved    Able to Return to Prior Arrangements yes       Resource/Environmental Concerns    Resource/Environmental Concerns none    Transportation Concerns none;no car       Transition Planning    Patient/Family Anticipates Transition to home    Patient/Family Anticipated Services at Transition none    Transportation Anticipated family or friend will provide       Discharge Needs Assessment    Readmission Within the Last 30 Days no previous admission in last 30 days    Equipment Currently Used at Home none    Concerns to be Addressed discharge planning    Anticipated Changes Related to Illness none    Equipment Needed After Discharge walker, rolling    Current Discharge Risk lives alone                   Discharge Plan       Row Name 07/05/23 1258       Plan    Plan DCP    Patient/Family in Agreement with Plan yes    Plan Comments SW met with pt at bedside for discharge planning. Pt lives in [place] alone in an apartment. Pt is independent with ADL's, but does not drive and has family/friends to help as needed. Pt does not use any DME but needs a RW at d/c. Pt is not current with any OP/HH services but denied the need. Pt does not use home O2 at baseline. Pt confirmed PCP. Pt uses [facility] for her pharmacy. Pt would like meds to beds. Goal is home with family or friend to transport. SW/CM will continue to follow for dc needs.    Final Discharge Disposition Code 01 - home or self-care                   Continued Care and Services - Admitted Since 7/4/2023    Coordination has not been started for this encounter.          Demographic Summary       Row Name 07/05/23 1256       General Information    Admission Type inpatient    Arrived From home    Required Notices Provided Important Message from Medicare    Referral Source admission list    Reason for Consult discharge planning                   Functional Status       Row Name 07/05/23 1256       Functional Status, IADL    Medications independent    Meal Preparation independent    Housekeeping independent    Laundry independent    Shopping assistive person       Mental Status Summary    Recent Changes in Mental Status/Cognitive Functioning unable to assess       Employment/    Employment Status unemployed                   Psychosocial       Row Name 07/05/23 1257       Developmental Stage (Eriksson's)    Developmental Stage Stage 8 (66 years-death/Late Adulthood) Integrity vs. Despair                   Abuse/Neglect    No documentation.                  Legal    No documentation.                  Substance Abuse    No documentation.                  Patient Forms    No documentation.                     DELANO Lynch

## 2023-07-05 NOTE — ED PROVIDER NOTES
Subjective:  History of Present Illness:    Patient is a 67-year-old female here today for cough and shortness of breath.  She states that she was seen here approximately 1 month ago for similar symptoms and was treated with steroids.  She was then evaluated by her primary provider who treated her with antibiotics and she has completed this course.  She reports that she is continuing to have symptoms and they seem to be progressing.  She feels more short of breath and unable to take a good breath.  She does vape, but states that she has not been able to do this for approximately 1 week.  She notes pain with taking a deep breath and feeling very fatigued.  No fever that she is aware of, minimally productive cough.  Is supposed to be seen by her primary provider tomorrow, but states that her breathing felt like it was worsening today and she was afraid to wait.      Nurses Notes reviewed and agree, including vitals, allergies, social history and prior medical history.     REVIEW OF SYSTEMS: All systems reviewed and not pertinent unless noted.  Review of Systems    Past Medical History:   Diagnosis Date    Depression     GERD (gastroesophageal reflux disease)     Hyperlipidemia     Panic attacks     Pleurisy        Allergies:    Penicillins      Past Surgical History:   Procedure Laterality Date    CHOLECYSTECTOMY      HYSTERECTOMY           Social History     Socioeconomic History    Marital status:    Tobacco Use    Smoking status: Former     Packs/day: 0.50     Types: Cigarettes    Smokeless tobacco: Never   Vaping Use    Vaping Use: Every day    Substances: Nicotine   Substance and Sexual Activity    Alcohol use: No    Drug use: No    Sexual activity: Defer         Family History   Problem Relation Age of Onset    Cirrhosis Mother     Cirrhosis Brother     Breast cancer Sister     Colon cancer Neg Hx        Objective  Physical Exam:  /77   Pulse 68   Temp 98 °F (36.7 °C) (Oral)   Resp 16   Ht 160  "cm (63\")   Wt 72.6 kg (160 lb)   SpO2 94%   BMI 28.34 kg/m²      Physical Exam  Vitals and nursing note reviewed.   Constitutional:       Appearance: Normal appearance.   HENT:      Head: Normocephalic and atraumatic.      Right Ear: Tympanic membrane, ear canal and external ear normal.      Left Ear: Tympanic membrane, ear canal and external ear normal.      Nose: Nose normal.      Mouth/Throat:      Mouth: Mucous membranes are moist.      Pharynx: Oropharynx is clear.   Eyes:      Extraocular Movements: Extraocular movements intact.      Conjunctiva/sclera: Conjunctivae normal.      Pupils: Pupils are equal, round, and reactive to light.   Neck:      Vascular: No carotid bruit.   Cardiovascular:      Rate and Rhythm: Normal rate and regular rhythm.      Pulses: Normal pulses.      Heart sounds: Normal heart sounds.   Pulmonary:      Effort: Pulmonary effort is normal.      Breath sounds: Decreased air movement present.   Abdominal:      General: Abdomen is flat. Bowel sounds are normal. There is no distension.      Palpations: Abdomen is soft.      Tenderness: There is no abdominal tenderness.   Musculoskeletal:      Cervical back: Neck supple. No tenderness.      Right lower leg: No edema.      Left lower leg: No edema.   Lymphadenopathy:      Cervical: No cervical adenopathy.   Skin:     General: Skin is warm and dry.      Capillary Refill: Capillary refill takes less than 2 seconds.   Neurological:      General: No focal deficit present.      Mental Status: She is alert and oriented to person, place, and time.   Psychiatric:         Mood and Affect: Mood normal.         Behavior: Behavior normal.       Procedures    ED Course:    ED Course as of 07/05/23 0228 Tue Jul 04, 2023 2029 EKG interpreted by me, normal sinus rhythm no concerning ST changes noted, rate of 73, T wave inversion in lead III, aVF, V1 V2 [JE]      ED Course User Index  [JE] Abdiaziz Vazquez MD       Lab Results (last 24 hours)  "      Procedure Component Value Units Date/Time    CBC & Differential [[id number]]  (Abnormal) Collected: 07/04/23 2020    Specimen: Blood Updated: 07/04/23 2028    Narrative:      The following orders were created for panel order CBC & Differential.  Procedure                               Abnormality         Status                     ---------                               -----------         ------                     CBC Auto Differential[[id number]]        Abnormal            Final result                 Please view results for these tests on the individual orders.    Comprehensive Metabolic Panel [[id number]]  (Abnormal) Collected: 07/04/23 2020    Specimen: Blood Updated: 07/04/23 2048     Glucose 98 mg/dL      BUN 8 mg/dL      Creatinine 0.48 mg/dL      Sodium 135 mmol/L      Potassium 2.8 mmol/L      Chloride 98 mmol/L      CO2 21.5 mmol/L      Calcium 9.1 mg/dL      Total Protein 6.6 g/dL      Albumin 3.0 g/dL      ALT (SGPT) 12 U/L      AST (SGOT) 35 U/L      Alkaline Phosphatase 95 U/L      Total Bilirubin 0.6 mg/dL      Globulin 3.6 gm/dL      A/G Ratio 0.8 g/dL      BUN/Creatinine Ratio 16.7     Anion Gap 15.5 mmol/L      eGFR 104.6 mL/min/1.73     Narrative:      GFR Normal >60  Chronic Kidney Disease <60  Kidney Failure <15      High Sensitivity Troponin T [[id number]]  (Abnormal) Collected: 07/04/23 2020    Specimen: Blood Updated: 07/04/23 2049     HS Troponin T 14 ng/L     Narrative:      High Sensitive Troponin T Reference Range:  <10.0 ng/L- Negative Female for AMI  <15.0 ng/L- Negative Male for AMI  >=10 - Abnormal Female indicating possible myocardial injury.  >=15 - Abnormal Male indicating possible myocardial injury.   Clinicians would have to utilize clinical acumen, EKG, Troponin, and serial changes to determine if it is an Acute Myocardial Infarction or myocardial injury due to an underlying chronic condition.         CBC Auto Differential [[id number]]  (Abnormal) Collected: 07/04/23 2020     Specimen: Blood Updated: 07/04/23 2028     WBC 9.19 10*3/mm3      RBC 3.81 10*6/mm3      Hemoglobin 11.2 g/dL      Hematocrit 33.5 %      MCV 87.9 fL      MCH 29.4 pg      MCHC 33.4 g/dL      RDW 14.2 %      RDW-SD 45.6 fl      MPV 10.2 fL      Platelets 547 10*3/mm3      Neutrophil % 79.3 %      Lymphocyte % 12.8 %      Monocyte % 5.2 %      Eosinophil % 2.0 %      Basophil % 0.2 %      Immature Grans % 0.5 %      Neutrophils, Absolute 7.28 10*3/mm3      Lymphocytes, Absolute 1.18 10*3/mm3      Monocytes, Absolute 0.48 10*3/mm3      Eosinophils, Absolute 0.18 10*3/mm3      Basophils, Absolute 0.02 10*3/mm3      Immature Grans, Absolute 0.05 10*3/mm3      nRBC 0.0 /100 WBC     BNP [[id number]]  (Abnormal) Collected: 07/04/23 2020    Specimen: Blood Updated: 07/04/23 2148     proBNP 992.4 pg/mL     Narrative:      Among patients with dyspnea, NT-proBNP is highly sensitive for the detection of acute congestive heart failure. In addition NT-proBNP of <300 pg/ml effectively rules out acute congestive heart failure with 99% negative predictive value.    Results may be falsely decreased if patient taking Biotin.      Magnesium [[id number]]  (Normal) Collected: 07/04/23 2020    Specimen: Blood Updated: 07/04/23 2136     Magnesium 1.6 mg/dL     Procalcitonin [[id number]]  (Normal) Collected: 07/04/23 2020    Specimen: Blood Updated: 07/05/23 0213     Procalcitonin 0.12 ng/mL     Narrative:      As a Marker for Sepsis (Non-Neonates):    1. <0.5 ng/mL represents a low risk of severe sepsis and/or septic shock.  2. >2 ng/mL represents a high risk of severe sepsis and/or septic shock.    As a Marker for Lower Respiratory Tract Infections that require antibiotic therapy:    PCT on Admission    Antibiotic Therapy       6-12 Hrs later    >0.5                Strongly Recommended  >0.25 - <0.5        Recommended   0.1 - 0.25          Discouraged              Remeasure/reassess PCT  <0.1                Strongly Discouraged      "Remeasure/reassess PCT    As 28 day mortality risk marker: \"Change in Procalcitonin Result\" (>80% or <=80%) if Day 0 (or Day 1) and Day 4 values are available. Refer to http://[email or web address]-pct-calculator.com    Change in PCT <=80%  A decrease of PCT levels below or equal to 80% defines a positive change in PCT test result representing a higher risk for 28-day all-cause mortality of patients diagnosed with severe sepsis for septic shock.    Change in PCT >80%  A decrease of PCT levels of more than 80% defines a negative change in PCT result representing a lower risk for 28-day all-cause mortality of patients diagnosed with severe sepsis or septic shock.       Respiratory Panel PCR w/COVID-19(SARS-CoV-2) JEFFERY/LEONIDAS/LIS/PAD/COR/MAD/PARVEZ In-House, NP Swab in UTM/VTM, 3-4 HR TAT - Swab, Nasopharynx [[id number]]  (Normal) Collected: 07/04/23 2206    Specimen: Swab from Nasopharynx Updated: 07/04/23 2258     ADENOVIRUS, PCR Not Detected     Coronavirus 229E Not Detected     Coronavirus HKU1 Not Detected     Coronavirus NL63 Not Detected     Coronavirus OC43 Not Detected     COVID19 Not Detected     Human Metapneumovirus Not Detected     Human Rhinovirus/Enterovirus Not Detected     Influenza A PCR Not Detected     Influenza B PCR Not Detected     Parainfluenza Virus 1 Not Detected     Parainfluenza Virus 2 Not Detected     Parainfluenza Virus 3 Not Detected     Parainfluenza Virus 4 Not Detected     RSV, PCR Not Detected     Bordetella pertussis pcr Not Detected     Bordetella parapertussis PCR Not Detected     Chlamydophila pneumoniae PCR Not Detected     Mycoplasma pneumo by PCR Not Detected    Narrative:      In the setting of a positive respiratory panel with a viral infection PLUS a negative procalcitonin without other underlying concern for bacterial infection, consider observing off antibiotics or discontinuation of antibiotics and continue supportive care. If the respiratory panel is positive for atypical bacterial " infection (Bordetella pertussis, Chlamydophila pneumoniae, or Mycoplasma pneumoniae), consider antibiotic de-escalation to target atypical bacterial infection.    High Sensitivity Troponin T 2Hr [[id number]]  (Abnormal) Collected: 07/04/23 2212    Specimen: Blood Updated: 07/04/23 2237     HS Troponin T 11 ng/L      Troponin T Delta -3 ng/L     Narrative:      High Sensitive Troponin T Reference Range:  <10.0 ng/L- Negative Female for AMI  <15.0 ng/L- Negative Male for AMI  >=10 - Abnormal Female indicating possible myocardial injury.  >=15 - Abnormal Male indicating possible myocardial injury.   Clinicians would have to utilize clinical acumen, EKG, Troponin, and serial changes to determine if it is an Acute Myocardial Infarction or myocardial injury due to an underlying chronic condition.         Lactic Acid, Plasma [[id number]] Collected: 07/05/23 0215    Specimen: Blood Updated: 07/05/23 0223    Blood Culture With TYRON - Blood, Arm, Right [[id number]] Collected: 07/05/23 0215    Specimen: Blood from Arm, Right Updated: 07/05/23 0222    Blood Culture With TYRON - Blood, Hand, Right [[id number]] Collected: 07/05/23 0217    Specimen: Blood from Hand, Right Updated: 07/05/23 0222             CT Angiogram Chest Pulmonary Embolism    Result Date: 7/5/2023  FINAL REPORT TECHNIQUE: null CLINICAL HISTORY: Hypoxia at rest, abnormal CXR COMPARISON: null FINDINGS: CTA chest Comparison: 06/12/2023. Findings: Full study including information received at 12:40 a.m. CDT 07/05/2023. 2.4 cm right and 2.2 cm left main pulmonary artery mild dilatation unchanged may be related to pulmonary arterial hypertension. No pulmonary embolism seen. Thoracic aortic atherosclerosis with no aneurysm or dissection. Coronary artery atherosclerosis. Old calcific granulomatous disease. Mild-to-moderate mediastinal and mild left-greater-than-right hilar lymphadenopathy increased. Small bilateral pleural effusions new compared to prior study. Centrilobular  emphysema. Increased cystic changes at apices.  Other subpleural small cystic changes including mid lower lungs as previous. This probably represent superimposed fibrosis. Ill-defined consolidation and groundglass attenuation bilaterally particularly upper lobes not seen on prior study. Overall findings suggest atypical pneumonia. Slight bilateral basilar dependent densities probably atelectasis. Clinical correlation and follow-up to resolution recommended. No pneumothorax. Mild apparent thickening of esophageal wall particularly upper mid may be mildly increased however collapsed nonspecific could be artifactual but can not exclude mild esophagitis. Cholecystectomy. Heterogeneity of spleen probably arterial phase related artifact. If clinically indicated, multiphasic CT abdomen follow-up recommended. Slight thickening left adrenal gland unchanged.     Impression: Impression: 1. New bilateral ill-defined consolidation and groundglass attenuation deleted particularly upper lobes, small pleural effusions, and lymphadenopathy probably related to pneumonia. Clinical correlation and follow-up to resolution recommended. 2. Underlying centrilobular emphysema and fibrosis. 3. No PE seen. Authenticated and Electronically Signed by Madhu Kramer MD on 07/05/2023 01:43:20 AM        MDM     Amount and/or Complexity of Data Reviewed  Clinical lab tests: reviewed  Tests in the radiology section of CPT®: reviewed  Tests in the medicine section of CPT®: reviewed        Initial impression of presenting illness: Patient is a 67-year-old female here today for shortness of breath, increased work of breathing, and cough.    DDX: includes but is not limited to: Pneumonia, COPD exacerbation, acute bronchitis, viral illness, among others    Patient arrives hemodynamically stable with vitals interpreted by myself.  SPO2 of 94% on room air    Pertinent features from physical exam: Diminished lung sounds heard throughout.    Initial  diagnostic plan: CBC, CMP, troponin, BNP, EKG, chest x-ray    Results from initial plan were reviewed and interpreted by me revealing initial serum potassium of 2.8.  White blood count is 9.19, and BNP is 992.  Her initial troponin is 14.  Abnormal appearing chest x-ray in comparison to previous.    Diagnostic information from other sources: Medical record    Interventions / Re-evaluation: Patient was given aspirin per chest pain protocol as well as an albuterol inhaler, Zofran, and added orders including magnesium, viral panel, lactic acid, and procalcitonin.    Results/clinical rationale were discussed with Dr. Vazquez who reviewed labs and chest x-ray.  Recommended obtaining CT chest for further evaluation as well as administering Solu-Medrol and magnesium.  Antibiotics added as well as Toradol for pain with breathing.  Patient did require supplemental oxygen as she had a decrease in her SPO2 to 85% on room air while sitting in the stretcher.  She quickly improved with the supplemental oxygen.    Consultations/Discussion of results with other physicians: I spoke with Dr. Coombs for admission.  Her CT scan shows discern for pneumonia as well as emphysema and fibrotic changes.  He accepts the patient for admission for full admit with telemetry.  Requested a consult be placed for pulmonology.    Disposition plan: Patient admitted to the hospital for further care and management.  -----        Final diagnoses:   Acute respiratory failure with hypoxia          Juve Tan, APRN  07/05/23 0229

## 2023-07-05 NOTE — PAYOR COMM NOTE
"TO:BC  FROM:JIE OLIVER, RN PHONE [phone]  IN CLINICALS REF# [id number]    David Antonio (67 y.o. Female)       Date of Birth   1955    Social Security Number       Address   [address] [place] [address] [address] [facility] [address]    Home Phone   [phone]    MRN   [id number]       Evangelical   Jainism    Marital Status                               Admission Date   7/4/23    Admission Type   Emergency    Admitting Provider   Ulises Coombs MD    Attending Provider   Ulises Coombs MD    Department, Room/Bed   [facility] TELEMETRY 3, 306/1       Discharge Date       Discharge Disposition       Discharge Destination                                 Attending Provider: Ulises Coombs MD    Allergies: Penicillins    Isolation: None   Infection: None   Code Status: Prior    Ht: 160 cm (62.99\")   Wt: 68.9 kg (151 lb 14.4 oz)    Admission Cmt: None   Principal Problem: Acute respiratory failure with hypoxia [J96.01]                   Active Insurance as of 7/4/2023       Primary Coverage       Payor Plan Insurance Group Employer/Plan Group    ANTHEM MEDICARE REPLACEMENT ANTHEM MEDICARE ADVANTAGE [id number]       Payor Plan Address Payor Plan Phone Number Payor Plan Fax Number Effective Dates    [address] [phone]  1/1/2021 - None Entered    [facility] [address]         Subscriber Name Subscriber Birth Date Member ID       DAVID ANTONIO 1955 [id number]               Secondary Coverage       Payor Plan Insurance Group Employer/Plan Group    [place]        Payor Plan Address Payor Plan Phone Number Payor Plan Fax Number Effective Dates    [address]   1/1/2014 - None Entered    [facility] [address]         Subscriber Name Subscriber Birth Date Member ID       DAVID ANTONIO 1955 [id number]                     Emergency Contacts        (Rel.) Home Phone Work Phone Mobile Phone "    Zabrina Lambert (Daughter) [phone] -- --              History & Physical    No notes of this type exist for this encounter.          Emergency Department Notes        Eri Burrell RN at 07/05/23 0210          Lab at bedside to obtain blood cultures.     Electronically signed by Eri Burrell RN at 07/05/23 0211       Juve Tan, SORIN at 07/04/23 2032       Attestation signed by Abdiaziz Vazquez MD at 07/05/23 0414        NON FACE TO FACE: This visit was performed by BOTH a physician and an APC. I performed all aspects of the MDM as documented.  Abdiaziz Vazquez MD 7/5/2023 04:14 EDT                         Subjective:  History of Present Illness:    Patient is a 67-year-old female here today for cough and shortness of breath.  She states that she was seen here approximately 1 month ago for similar symptoms and was treated with steroids.  She was then evaluated by her primary provider who treated her with antibiotics and she has completed this course.  She reports that she is continuing to have symptoms and they seem to be progressing.  She feels more short of breath and unable to take a good breath.  She does vape, but states that she has not been able to do this for approximately 1 week.  She notes pain with taking a deep breath and feeling very fatigued.  No fever that she is aware of, minimally productive cough.  Is supposed to be seen by her primary provider tomorrow, but states that her breathing felt like it was worsening today and she was afraid to wait.      Nurses Notes reviewed and agree, including vitals, allergies, social history and prior medical history.     REVIEW OF SYSTEMS: All systems reviewed and not pertinent unless noted.  Review of Systems    Past Medical History:   Diagnosis Date    Depression     GERD (gastroesophageal reflux disease)     Hyperlipidemia     Panic attacks     Pleurisy        Allergies:    Penicillins      Past Surgical History:   Procedure  "Laterality Date    CHOLECYSTECTOMY      HYSTERECTOMY           Social History     Socioeconomic History    Marital status:    Tobacco Use    Smoking status: Former     Packs/day: 0.50     Types: Cigarettes    Smokeless tobacco: Never   Vaping Use    Vaping Use: Every day    Substances: Nicotine   Substance and Sexual Activity    Alcohol use: No    Drug use: No    Sexual activity: Defer         Family History   Problem Relation Age of Onset    Cirrhosis Mother     Cirrhosis Brother     Breast cancer Sister     Colon cancer Neg Hx        Objective  Physical Exam:  /77   Pulse 68   Temp 98 °F (36.7 °C) (Oral)   Resp 16   Ht 160 cm (63\")   Wt 72.6 kg (160 lb)   SpO2 94%   BMI 28.34 kg/m²      Physical Exam  Vitals and nursing note reviewed.   Constitutional:       Appearance: Normal appearance.   HENT:      Head: Normocephalic and atraumatic.      Right Ear: Tympanic membrane, ear canal and external ear normal.      Left Ear: Tympanic membrane, ear canal and external ear normal.      Nose: Nose normal.      Mouth/Throat:      Mouth: Mucous membranes are moist.      Pharynx: Oropharynx is clear.   Eyes:      Extraocular Movements: Extraocular movements intact.      Conjunctiva/sclera: Conjunctivae normal.      Pupils: Pupils are equal, round, and reactive to light.   Neck:      Vascular: No carotid bruit.   Cardiovascular:      Rate and Rhythm: Normal rate and regular rhythm.      Pulses: Normal pulses.      Heart sounds: Normal heart sounds.   Pulmonary:      Effort: Pulmonary effort is normal.      Breath sounds: Decreased air movement present.   Abdominal:      General: Abdomen is flat. Bowel sounds are normal. There is no distension.      Palpations: Abdomen is soft.      Tenderness: There is no abdominal tenderness.   Musculoskeletal:      Cervical back: Neck supple. No tenderness.      Right lower leg: No edema.      Left lower leg: No edema.   Lymphadenopathy:      Cervical: No cervical " adenopathy.   Skin:     General: Skin is warm and dry.      Capillary Refill: Capillary refill takes less than 2 seconds.   Neurological:      General: No focal deficit present.      Mental Status: She is alert and oriented to person, place, and time.   Psychiatric:         Mood and Affect: Mood normal.         Behavior: Behavior normal.       Procedures    ED Course:    ED Course as of 07/05/23 0228 Tue Jul 04, 2023 2029 EKG interpreted by me, normal sinus rhythm no concerning ST changes noted, rate of 73, T wave inversion in lead III, aVF, V1 V2 [JE]      ED Course User Index  [JE] Abdiaziz Vazquez MD       Lab Results (last 24 hours)       Procedure Component Value Units Date/Time    CBC & Differential [[id number]]  (Abnormal) Collected: 07/04/23 2020    Specimen: Blood Updated: 07/04/23 2028    Narrative:      The following orders were created for panel order CBC & Differential.  Procedure                               Abnormality         Status                     ---------                               -----------         ------                     CBC Auto Differential[[id number]]        Abnormal            Final result                 Please view results for these tests on the individual orders.    Comprehensive Metabolic Panel [[id number]]  (Abnormal) Collected: 07/04/23 2020    Specimen: Blood Updated: 07/04/23 2048     Glucose 98 mg/dL      BUN 8 mg/dL      Creatinine 0.48 mg/dL      Sodium 135 mmol/L      Potassium 2.8 mmol/L      Chloride 98 mmol/L      CO2 21.5 mmol/L      Calcium 9.1 mg/dL      Total Protein 6.6 g/dL      Albumin 3.0 g/dL      ALT (SGPT) 12 U/L      AST (SGOT) 35 U/L      Alkaline Phosphatase 95 U/L      Total Bilirubin 0.6 mg/dL      Globulin 3.6 gm/dL      A/G Ratio 0.8 g/dL      BUN/Creatinine Ratio 16.7     Anion Gap 15.5 mmol/L      eGFR 104.6 mL/min/1.73     Narrative:      GFR Normal >60  Chronic Kidney Disease <60  Kidney Failure <15      High Sensitivity Troponin T  [[id number]]  (Abnormal) Collected: 07/04/23 2020    Specimen: Blood Updated: 07/04/23 2049     HS Troponin T 14 ng/L     Narrative:      High Sensitive Troponin T Reference Range:  <10.0 ng/L- Negative Female for AMI  <15.0 ng/L- Negative Male for AMI  >=10 - Abnormal Female indicating possible myocardial injury.  >=15 - Abnormal Male indicating possible myocardial injury.   Clinicians would have to utilize clinical acumen, EKG, Troponin, and serial changes to determine if it is an Acute Myocardial Infarction or myocardial injury due to an underlying chronic condition.         CBC Auto Differential [[id number]]  (Abnormal) Collected: 07/04/23 2020    Specimen: Blood Updated: 07/04/23 2028     WBC 9.19 10*3/mm3      RBC 3.81 10*6/mm3      Hemoglobin 11.2 g/dL      Hematocrit 33.5 %      MCV 87.9 fL      MCH 29.4 pg      MCHC 33.4 g/dL      RDW 14.2 %      RDW-SD 45.6 fl      MPV 10.2 fL      Platelets 547 10*3/mm3      Neutrophil % 79.3 %      Lymphocyte % 12.8 %      Monocyte % 5.2 %      Eosinophil % 2.0 %      Basophil % 0.2 %      Immature Grans % 0.5 %      Neutrophils, Absolute 7.28 10*3/mm3      Lymphocytes, Absolute 1.18 10*3/mm3      Monocytes, Absolute 0.48 10*3/mm3      Eosinophils, Absolute 0.18 10*3/mm3      Basophils, Absolute 0.02 10*3/mm3      Immature Grans, Absolute 0.05 10*3/mm3      nRBC 0.0 /100 WBC     BNP [[id number]]  (Abnormal) Collected: 07/04/23 2020    Specimen: Blood Updated: 07/04/23 2148     proBNP 992.4 pg/mL     Narrative:      Among patients with dyspnea, NT-proBNP is highly sensitive for the detection of acute congestive heart failure. In addition NT-proBNP of <300 pg/ml effectively rules out acute congestive heart failure with 99% negative predictive value.    Results may be falsely decreased if patient taking Biotin.      Magnesium [[id number]]  (Normal) Collected: 07/04/23 2020    Specimen: Blood Updated: 07/04/23 2136     Magnesium 1.6 mg/dL     Procalcitonin [[id number]]  (Normal)  "Collected: 07/04/23 2020    Specimen: Blood Updated: 07/05/23 0213     Procalcitonin 0.12 ng/mL     Narrative:      As a Marker for Sepsis (Non-Neonates):    1. <0.5 ng/mL represents a low risk of severe sepsis and/or septic shock.  2. >2 ng/mL represents a high risk of severe sepsis and/or septic shock.    As a Marker for Lower Respiratory Tract Infections that require antibiotic therapy:    PCT on Admission    Antibiotic Therapy       6-12 Hrs later    >0.5                Strongly Recommended  >0.25 - <0.5        Recommended   0.1 - 0.25          Discouraged              Remeasure/reassess PCT  <0.1                Strongly Discouraged     Remeasure/reassess PCT    As 28 day mortality risk marker: \"Change in Procalcitonin Result\" (>80% or <=80%) if Day 0 (or Day 1) and Day 4 values are available. Refer to http://www.[employer]s-pct-calculator.com    Change in PCT <=80%  A decrease of PCT levels below or equal to 80% defines a positive change in PCT test result representing a higher risk for 28-day all-cause mortality of patients diagnosed with severe sepsis for septic shock.    Change in PCT >80%  A decrease of PCT levels of more than 80% defines a negative change in PCT result representing a lower risk for 28-day all-cause mortality of patients diagnosed with severe sepsis or septic shock.       Respiratory Panel PCR w/COVID-19(SARS-CoV-2) JEFFERY/LEONIDAS/LIS/PAD/COR/MAD/PARVEZ In-House, NP Swab in UTM/VTM, 3-4 HR TAT - Swab, Nasopharynx [[id number]]  (Normal) Collected: 07/04/23 2206    Specimen: Swab from Nasopharynx Updated: 07/04/23 2258     ADENOVIRUS, PCR Not Detected     Coronavirus 229E Not Detected     Coronavirus HKU1 Not Detected     Coronavirus NL63 Not Detected     Coronavirus OC43 Not Detected     COVID19 Not Detected     Human Metapneumovirus Not Detected     Human Rhinovirus/Enterovirus Not Detected     Influenza A PCR Not Detected     Influenza B PCR Not Detected     Parainfluenza Virus 1 Not Detected     " Parainfluenza Virus 2 Not Detected     Parainfluenza Virus 3 Not Detected     Parainfluenza Virus 4 Not Detected     RSV, PCR Not Detected     Bordetella pertussis pcr Not Detected     Bordetella parapertussis PCR Not Detected     Chlamydophila pneumoniae PCR Not Detected     Mycoplasma pneumo by PCR Not Detected    Narrative:      In the setting of a positive respiratory panel with a viral infection PLUS a negative procalcitonin without other underlying concern for bacterial infection, consider observing off antibiotics or discontinuation of antibiotics and continue supportive care. If the respiratory panel is positive for atypical bacterial infection (Bordetella pertussis, Chlamydophila pneumoniae, or Mycoplasma pneumoniae), consider antibiotic de-escalation to target atypical bacterial infection.    High Sensitivity Troponin T 2Hr [[id number]]  (Abnormal) Collected: 07/04/23 2212    Specimen: Blood Updated: 07/04/23 2237     HS Troponin T 11 ng/L      Troponin T Delta -3 ng/L     Narrative:      High Sensitive Troponin T Reference Range:  <10.0 ng/L- Negative Female for AMI  <15.0 ng/L- Negative Male for AMI  >=10 - Abnormal Female indicating possible myocardial injury.  >=15 - Abnormal Male indicating possible myocardial injury.   Clinicians would have to utilize clinical acumen, EKG, Troponin, and serial changes to determine if it is an Acute Myocardial Infarction or myocardial injury due to an underlying chronic condition.         Lactic Acid, Plasma [[id number]] Collected: 07/05/23 0215    Specimen: Blood Updated: 07/05/23 0223    Blood Culture With TYRON - Blood, Arm, Right [[id number]] Collected: 07/05/23 0215    Specimen: Blood from Arm, Right Updated: 07/05/23 0222    Blood Culture With TYRON - Blood, Hand, Right [[id number]] Collected: 07/05/23 0217    Specimen: Blood from Hand, Right Updated: 07/05/23 0222             CT Angiogram Chest Pulmonary Embolism    Result Date: 7/5/2023  FINAL REPORT TECHNIQUE: null  CLINICAL HISTORY: Hypoxia at rest, abnormal CXR COMPARISON: null FINDINGS: CTA chest Comparison: 06/12/2023. Findings: Full study including information received at 12:40 a.m. CDT 07/05/2023. 2.4 cm right and 2.2 cm left main pulmonary artery mild dilatation unchanged may be related to pulmonary arterial hypertension. No pulmonary embolism seen. Thoracic aortic atherosclerosis with no aneurysm or dissection. Coronary artery atherosclerosis. Old calcific granulomatous disease. Mild-to-moderate mediastinal and mild left-greater-than-right hilar lymphadenopathy increased. Small bilateral pleural effusions new compared to prior study. Centrilobular emphysema. Increased cystic changes at apices.  Other subpleural small cystic changes including mid lower lungs as previous. This probably represent superimposed fibrosis. Ill-defined consolidation and groundglass attenuation bilaterally particularly upper lobes not seen on prior study. Overall findings suggest atypical pneumonia. Slight bilateral basilar dependent densities probably atelectasis. Clinical correlation and follow-up to resolution recommended. No pneumothorax. Mild apparent thickening of esophageal wall particularly upper mid may be mildly increased however collapsed nonspecific could be artifactual but can not exclude mild esophagitis. Cholecystectomy. Heterogeneity of spleen probably arterial phase related artifact. If clinically indicated, multiphasic CT abdomen follow-up recommended. Slight thickening left adrenal gland unchanged.     Impression: Impression: 1. New bilateral ill-defined consolidation and groundglass attenuation deleted particularly upper lobes, small pleural effusions, and lymphadenopathy probably related to pneumonia. Clinical correlation and follow-up to resolution recommended. 2. Underlying centrilobular emphysema and fibrosis. 3. No PE seen. Authenticated and Electronically Signed by Madhu Kramer MD on 07/05/2023 01:43:20 AM         MDM     Amount and/or Complexity of Data Reviewed  Clinical lab tests: reviewed  Tests in the radiology section of CPT®: reviewed  Tests in the medicine section of CPT®: reviewed        Initial impression of presenting illness: Patient is a 67-year-old female here today for shortness of breath, increased work of breathing, and cough.    DDX: includes but is not limited to: Pneumonia, COPD exacerbation, acute bronchitis, viral illness, among others    Patient arrives hemodynamically stable with vitals interpreted by myself.  SPO2 of 94% on room air    Pertinent features from physical exam: Diminished lung sounds heard throughout.    Initial diagnostic plan: CBC, CMP, troponin, BNP, EKG, chest x-ray    Results from initial plan were reviewed and interpreted by me revealing initial serum potassium of 2.8.  White blood count is 9.19, and BNP is 992.  Her initial troponin is 14.  Abnormal appearing chest x-ray in comparison to previous.    Diagnostic information from other sources: Medical record    Interventions / Re-evaluation: Patient was given aspirin per chest pain protocol as well as an albuterol inhaler, Zofran, and added orders including magnesium, viral panel, lactic acid, and procalcitonin.    Results/clinical rationale were discussed with Dr. Vazquez who reviewed labs and chest x-ray.  Recommended obtaining CT chest for further evaluation as well as administering Solu-Medrol and magnesium.  Antibiotics added as well as Toradol for pain with breathing.  Patient did require supplemental oxygen as she had a decrease in her SPO2 to 85% on room air while sitting in the stretcher.  She quickly improved with the supplemental oxygen.    Consultations/Discussion of results with other physicians: I spoke with Dr. Coombs for admission.  Her CT scan shows discern for pneumonia as well as emphysema and fibrotic changes.  He accepts the patient for admission for full admit with telemetry.  Requested a consult be placed for  pulmonology.    Disposition plan: Patient admitted to the hospital for further care and management.  -----        Final diagnoses:   Acute respiratory failure with hypoxia          Juve Tan APRN  07/05/23 0229      Electronically signed by Abdiaziz Vazquez MD at 07/05/23 0414       Vital Signs (last day)       Date/Time Temp Temp src Pulse Resp BP Patient Position SpO2    07/05/23 0700 97.7 (36.5) Oral 83 16 150/70 Sitting 95    07/05/23 0657 -- -- -- 16 -- -- --    07/05/23 0314 98 (36.7) Oral 76 16 158/71 Lying --    07/05/23 0230 -- -- 73 -- 150/88 -- 97    07/05/23 0200 -- -- 68 -- 157/77 -- 94    07/05/23 0130 -- -- 69 -- 161/91 -- 96    07/05/23 0030 -- -- 71 16 151/73 -- 94    07/05/23 0000 -- -- 80 -- 156/58 -- 94    07/04/23 2330 -- -- 73 16 159/69 -- 92    07/04/23 2255 -- -- 73 -- -- -- 92    07/04/23 2254 -- -- 75 -- -- -- 85    07/04/23 22:07:58 -- -- 75 16 168/70 -- 92    07/04/23 2130 -- -- 69 -- 152/71 -- --    07/04/23 2100 -- -- 70 16 159/72 -- 93    07/04/23 2045 -- -- 71 -- -- -- 91    07/04/23 2030 -- -- 70 16 151/83 -- 94    07/04/23 2007 98 (36.7) Oral 82 16 170/76 Sitting 94          Facility-Administered Medications as of 7/5/2023   Medication Dose Route Frequency Provider Last Rate Last Admin    [COMPLETED] albuterol sulfate HFA (PROVENTIL HFA;VENTOLIN HFA;PROAIR HFA) inhaler 6 puff  6 puff Inhalation Once Juve Tan APRN   6 puff at 07/04/23 2211    [COMPLETED] aspirin tablet 325 mg  325 mg Oral Once Benito Bar DO   325 mg at 07/04/23 2041    [COMPLETED] azithromycin (ZITHROMAX) 500 mg 0.9% NaCl (Add-vantage) 250 mL  500 mg Intravenous Once DominickJuve ly APRN   500 mg at 07/05/23 0404    [COMPLETED] cefTRIAXone (ROCEPHIN) IVPB 1 g/50ml dextrose (premix)  1 g Intravenous Once DominickJuve ly APRN 100 mL/hr at 07/05/23 0300 1 g at 07/05/23 0300    [COMPLETED] iopamidol (ISOVUE-300) 61 % injection 100 mL  100 mL Intravenous Once in imaging [place], Abdiaziz Daniel MD   100  mL at 07/05/23 0000    ipratropium-albuterol (DUO-NEB) nebulizer solution 3 mL  3 mL Nebulization Q6H - RT Ulises Coombs MD   3 mL at 07/05/23 0657    [COMPLETED] ketorolac (TORADOL) injection 15 mg  15 mg Intravenous Once [place], Juve L, APRN   15 mg at 07/05/23 0147    [COMPLETED] magnesium sulfate 2g/50 mL (PREMIX) infusion  2 g Intravenous Once [place], Juve L, APRN   Stopped at 07/05/23 0000    [COMPLETED] methylPREDNISolone sodium succinate (SOLU-Medrol) injection 125 mg  125 mg Intravenous Once Dominick, Juve L, APRN   125 mg at 07/04/23 2314    methylPREDNISolone sodium succinate (SOLU-Medrol) injection 40 mg  40 mg Intravenous BID Ulises Coombs MD   40 mg at 07/05/23 0823    [COMPLETED] ondansetron (ZOFRAN) injection 4 mg  4 mg Intravenous Once Dominick, Juve L, APRN   4 mg at 07/04/23 2228    [COMPLETED] potassium chloride (MICRO-K) CR capsule 40 mEq  40 mEq Oral Once Dominick, Juve L, APRN   40 mEq at 07/04/23 2318    sodium chloride 0.9 % flush 10 mL  10 mL Intravenous PRN Benito Bar DO         Lab Results (last 24 hours)       Procedure Component Value Units Date/Time    Lactic Acid, Plasma [[id number]]  (Normal) Collected: 07/05/23 0215    Specimen: Blood Updated: 07/05/23 0238     Lactate 1.0 mmol/L     Blood Culture With TYRON - Blood, Hand, Right [[id number]] Collected: 07/05/23 0217    Specimen: Blood from Hand, Right Updated: 07/05/23 0222    Blood Culture With TYRON - Blood, Arm, Right [[id number]] Collected: 07/05/23 0215    Specimen: Blood from Arm, Right Updated: 07/05/23 0222    Procalcitonin [[id number]]  (Normal) Collected: 07/04/23 2020    Specimen: Blood Updated: 07/05/23 0213     Procalcitonin 0.12 ng/mL     Narrative:      As a Marker for Sepsis (Non-Neonates):    1. <0.5 ng/mL represents a low risk of severe sepsis and/or septic shock.  2. >2 ng/mL represents a high risk of severe sepsis and/or septic shock.    As a Marker for Lower Respiratory Tract Infections that require antibiotic  "therapy:    PCT on Admission    Antibiotic Therapy       6-12 Hrs later    >0.5                Strongly Recommended  >0.25 - <0.5        Recommended   0.1 - 0.25          Discouraged              Remeasure/reassess PCT  <0.1                Strongly Discouraged     Remeasure/reassess PCT    As 28 day mortality risk marker: \"Change in Procalcitonin Result\" (>80% or <=80%) if Day 0 (or Day 1) and Day 4 values are available. Refer to http://www.[facility]-pct-calculator.com    Change in PCT <=80%  A decrease of PCT levels below or equal to 80% defines a positive change in PCT test result representing a higher risk for 28-day all-cause mortality of patients diagnosed with severe sepsis for septic shock.    Change in PCT >80%  A decrease of PCT levels of more than 80% defines a negative change in PCT result representing a lower risk for 28-day all-cause mortality of patients diagnosed with severe sepsis or septic shock.       Respiratory Panel PCR w/COVID-19(SARS-CoV-2) JEFFERY/LEONIDAS/LIS/PAD/COR/MAD/PARVEZ In-House, NP Swab in UTM/VTM, 3-4 HR TAT - Swab, Nasopharynx [[id number]]  (Normal) Collected: 07/04/23 2206    Specimen: Swab from Nasopharynx Updated: 07/04/23 2258     ADENOVIRUS, PCR Not Detected     Coronavirus 229E Not Detected     Coronavirus HKU1 Not Detected     Coronavirus NL63 Not Detected     Coronavirus OC43 Not Detected     COVID19 Not Detected     Human Metapneumovirus Not Detected     Human Rhinovirus/Enterovirus Not Detected     Influenza A PCR Not Detected     Influenza B PCR Not Detected     Parainfluenza Virus 1 Not Detected     Parainfluenza Virus 2 Not Detected     Parainfluenza Virus 3 Not Detected     Parainfluenza Virus 4 Not Detected     RSV, PCR Not Detected     Bordetella pertussis pcr Not Detected     Bordetella parapertussis PCR Not Detected     Chlamydophila pneumoniae PCR Not Detected     Mycoplasma pneumo by PCR Not Detected    Narrative:      In the setting of a positive respiratory panel with a " viral infection PLUS a negative procalcitonin without other underlying concern for bacterial infection, consider observing off antibiotics or discontinuation of antibiotics and continue supportive care. If the respiratory panel is positive for atypical bacterial infection (Bordetella pertussis, Chlamydophila pneumoniae, or Mycoplasma pneumoniae), consider antibiotic de-escalation to target atypical bacterial infection.    High Sensitivity Troponin T 2Hr [[id number]]  (Abnormal) Collected: 07/04/23 2212    Specimen: Blood Updated: 07/04/23 2237     HS Troponin T 11 ng/L      Troponin T Delta -3 ng/L     Narrative:      High Sensitive Troponin T Reference Range:  <10.0 ng/L- Negative Female for AMI  <15.0 ng/L- Negative Male for AMI  >=10 - Abnormal Female indicating possible myocardial injury.  >=15 - Abnormal Male indicating possible myocardial injury.   Clinicians would have to utilize clinical acumen, EKG, Troponin, and serial changes to determine if it is an Acute Myocardial Infarction or myocardial injury due to an underlying chronic condition.         BNP [[id number]]  (Abnormal) Collected: 07/04/23 2020    Specimen: Blood Updated: 07/04/23 2148     proBNP 992.4 pg/mL     Narrative:      Among patients with dyspnea, NT-proBNP is highly sensitive for the detection of acute congestive heart failure. In addition NT-proBNP of <300 pg/ml effectively rules out acute congestive heart failure with 99% negative predictive value.    Results may be falsely decreased if patient taking Biotin.      Magnesium [[id number]]  (Normal) Collected: 07/04/23 2020    Specimen: Blood Updated: 07/04/23 2136     Magnesium 1.6 mg/dL     [place] Draw [[id number]] Collected: 07/04/23 2020    Specimen: Blood Updated: 07/04/23 2131    Narrative:      The following orders were created for panel order [place] [facility].  Procedure                               Abnormality         Status                     ---------                                -----------         ------                     Green Top (Gel)[[id number]]                                  Final result               Lavender Top[[id number]]                                     Final result               Gold Top - SST[[id number]]                                   Final result               Light Blue Top[[id number]]                                   Final result                 Please view results for these tests on the individual orders.    Gold Top - SST [[id number]] Collected: 07/04/23 2020    Specimen: Blood Updated: 07/04/23 2131     Extra Tube Hold for add-ons.     Comment: Auto resulted.       Light Blue Top [[id number]] Collected: 07/04/23 2020    Specimen: Blood Updated: 07/04/23 2131     Extra Tube Hold for add-ons.     Comment: Auto resulted       Green Top (Gel) [[id number]] Collected: 07/04/23 2020    Specimen: Blood Updated: 07/04/23 2131     Extra Tube Hold for add-ons.     Comment: Auto resulted.       Lavender Top [[id number]] Collected: 07/04/23 2020    Specimen: Blood Updated: 07/04/23 2131     Extra Tube hold for add-on     Comment: Auto resulted       High Sensitivity Troponin T [[id number]]  (Abnormal) Collected: 07/04/23 2020    Specimen: Blood Updated: 07/04/23 2049     HS Troponin T 14 ng/L     Narrative:      High Sensitive Troponin T Reference Range:  <10.0 ng/L- Negative Female for AMI  <15.0 ng/L- Negative Male for AMI  >=10 - Abnormal Female indicating possible myocardial injury.  >=15 - Abnormal Male indicating possible myocardial injury.   Clinicians would have to utilize clinical acumen, EKG, Troponin, and serial changes to determine if it is an Acute Myocardial Infarction or myocardial injury due to an underlying chronic condition.         Comprehensive Metabolic Panel [[id number]]  (Abnormal) Collected: 07/04/23 2020    Specimen: Blood Updated: 07/04/23 2048     Glucose 98 mg/dL      BUN 8 mg/dL      Creatinine 0.48 mg/dL      Sodium 135 mmol/L      Potassium 2.8 mmol/L       Chloride 98 mmol/L      CO2 21.5 mmol/L      Calcium 9.1 mg/dL      Total Protein 6.6 g/dL      Albumin 3.0 g/dL      ALT (SGPT) 12 U/L      AST (SGOT) 35 U/L      Alkaline Phosphatase 95 U/L      Total Bilirubin 0.6 mg/dL      Globulin 3.6 gm/dL      A/G Ratio 0.8 g/dL      BUN/Creatinine Ratio 16.7     Anion Gap 15.5 mmol/L      eGFR 104.6 mL/min/1.73     Narrative:      GFR Normal >60  Chronic Kidney Disease <60  Kidney Failure <15      CBC & Differential [[id number]]  (Abnormal) Collected: 07/04/23 2020    Specimen: Blood Updated: 07/04/23 2028    Narrative:      The following orders were created for panel order CBC & Differential.  Procedure                               Abnormality         Status                     ---------                               -----------         ------                     CBC Auto Differential[[id number]]        Abnormal            Final result                 Please view results for these tests on the individual orders.    CBC Auto Differential [[id number]]  (Abnormal) Collected: 07/04/23 2020    Specimen: Blood Updated: 07/04/23 2028     WBC 9.19 10*3/mm3      RBC 3.81 10*6/mm3      Hemoglobin 11.2 g/dL      Hematocrit 33.5 %      MCV 87.9 fL      MCH 29.4 pg      MCHC 33.4 g/dL      RDW 14.2 %      RDW-SD 45.6 fl      MPV 10.2 fL      Platelets 547 10*3/mm3      Neutrophil % 79.3 %      Lymphocyte % 12.8 %      Monocyte % 5.2 %      Eosinophil % 2.0 %      Basophil % 0.2 %      Immature Grans % 0.5 %      Neutrophils, Absolute 7.28 10*3/mm3      Lymphocytes, Absolute 1.18 10*3/mm3      Monocytes, Absolute 0.48 10*3/mm3      Eosinophils, Absolute 0.18 10*3/mm3      Basophils, Absolute 0.02 10*3/mm3      Immature Grans, Absolute 0.05 10*3/mm3      nRBC 0.0 /100 WBC           Imaging Results (Last 24 Hours)       Procedure Component Value Units Date/Time    XR Chest 1 View [[id number]] Collected: 07/05/23 0742     Updated: 07/05/23 0746    Narrative:      PROCEDURE: XR CHEST 1  VW-     INDICATION:  Chest Pain Triage Protocol     FINDINGS:  A portable view of the chest was obtained.  Comparison is  made to a prior exam dated 06/12/2023.   Heart size is normal. There are  new bilateral upper lobe opacities, left greater than right concerning  for pneumonia. A component of cavitation is not excluded on the left.  There are small bilateral pleural effusions. No pneumothorax.       Impression:      New bilateral upper lobe opacities concerning for  pneumonia. Cavitation is not excluded on the left. Recommend  radiographic follow-up to resolution.     This report was signed and finalized on 7/5/2023 7:44 AM by Christa Cruz MD.    CT Angiogram Chest Pulmonary Embolism [[id number]] Collected: 07/05/23 0143     Updated: 07/05/23 0145    Narrative:      FINAL REPORT    TECHNIQUE:  null    CLINICAL HISTORY:  Hypoxia at rest, abnormal CXR    COMPARISON:  null    FINDINGS:  CTA chest    Comparison: 06/12/2023.    Findings:    Full study including information received at 12:40 a.m. CDT 07/05/2023.    2.4 cm right and 2.2 cm left main pulmonary artery mild dilatation unchanged may be related to pulmonary arterial hypertension. No pulmonary embolism seen. Thoracic aortic atherosclerosis with no aneurysm or dissection. Coronary artery atherosclerosis.   Old calcific granulomatous disease. Mild-to-moderate mediastinal and mild left-greater-than-right hilar lymphadenopathy increased. Small bilateral pleural effusions new compared to prior study. Centrilobular emphysema. Increased cystic changes at apices.   Other subpleural small cystic changes including mid lower lungs as previous. This probably represent superimposed fibrosis. Ill-defined consolidation and groundglass attenuation bilaterally particularly upper lobes not seen on prior study. Overall   findings suggest atypical pneumonia. Slight bilateral basilar dependent densities probably atelectasis. Clinical correlation and follow-up to resolution  recommended. No pneumothorax. Mild apparent thickening of esophageal wall particularly upper mid may   be mildly increased however collapsed nonspecific could be artifactual but can not exclude mild esophagitis. Cholecystectomy. Heterogeneity of spleen probably arterial phase related artifact. If clinically indicated, multiphasic CT abdomen follow-up   recommended. Slight thickening left adrenal gland unchanged.      Impression:      Impression:    1. New bilateral ill-defined consolidation and groundglass attenuation deleted particularly upper lobes, small pleural effusions, and lymphadenopathy probably related to pneumonia. Clinical correlation and follow-up to resolution recommended.    2. Underlying centrilobular emphysema and fibrosis.    3. No PE seen.    Authenticated and Electronically Signed by Madhu Kramer MD on  07/05/2023 01:43:20 AM          Physician Progress Notes (last 24 hours)  Notes from 07/04/23 0843 through 07/05/23 0843   No notes of this type exist for this encounter.       Consult Notes (last 24 hours)  Notes from 07/04/23 0843 through 07/05/23 0843   No notes of this type exist for this encounter.

## 2023-07-06 LAB
ALBUMIN SERPL-MCNC: 2.6 G/DL (ref 3.5–5.2)
ALBUMIN/GLOB SERPL: 0.9 G/DL
ALP SERPL-CCNC: 73 U/L (ref 39–117)
ALT SERPL W P-5'-P-CCNC: 9 U/L (ref 1–33)
ANION GAP SERPL CALCULATED.3IONS-SCNC: 9.8 MMOL/L (ref 5–15)
AST SERPL-CCNC: 18 U/L (ref 1–32)
BILIRUB SERPL-MCNC: 0.3 MG/DL (ref 0–1.2)
BUN SERPL-MCNC: 10 MG/DL (ref 8–23)
BUN/CREAT SERPL: 23.3 (ref 7–25)
CALCIUM SPEC-SCNC: 8.3 MG/DL (ref 8.6–10.5)
CHLORIDE SERPL-SCNC: 109 MMOL/L (ref 98–107)
CO2 SERPL-SCNC: 22.2 MMOL/L (ref 22–29)
CREAT SERPL-MCNC: 0.43 MG/DL (ref 0.57–1)
DEPRECATED RDW RBC AUTO: 49 FL (ref 37–54)
EGFRCR SERPLBLD CKD-EPI 2021: 107.4 ML/MIN/1.73
ERYTHROCYTE [DISTWIDTH] IN BLOOD BY AUTOMATED COUNT: 14.5 % (ref 12.3–15.4)
GLOBULIN UR ELPH-MCNC: 3 GM/DL
GLUCOSE SERPL-MCNC: 94 MG/DL (ref 65–99)
HCT VFR BLD AUTO: 29.9 % (ref 34–46.6)
HGB BLD-MCNC: 9.5 G/DL (ref 12–15.9)
MCH RBC QN AUTO: 29.4 PG (ref 26.6–33)
MCHC RBC AUTO-ENTMCNC: 31.8 G/DL (ref 31.5–35.7)
MCV RBC AUTO: 92.6 FL (ref 79–97)
PLATELET # BLD AUTO: 496 10*3/MM3 (ref 140–450)
PMV BLD AUTO: 10.3 FL (ref 6–12)
POTASSIUM SERPL-SCNC: 4.1 MMOL/L (ref 3.5–5.2)
PROT SERPL-MCNC: 5.6 G/DL (ref 6–8.5)
RBC # BLD AUTO: 3.23 10*6/MM3 (ref 3.77–5.28)
SODIUM SERPL-SCNC: 141 MMOL/L (ref 136–145)
WBC NRBC COR # BLD: 7.35 10*3/MM3 (ref 3.4–10.8)

## 2023-07-06 PROCEDURE — 94799 UNLISTED PULMONARY SVC/PX: CPT

## 2023-07-06 PROCEDURE — 25010000002 METHYLPREDNISOLONE PER 40 MG: Performed by: INTERNAL MEDICINE

## 2023-07-06 PROCEDURE — 80053 COMPREHEN METABOLIC PANEL: CPT | Performed by: INTERNAL MEDICINE

## 2023-07-06 PROCEDURE — 99222 1ST HOSP IP/OBS MODERATE 55: CPT | Performed by: INTERNAL MEDICINE

## 2023-07-06 PROCEDURE — 94761 N-INVAS EAR/PLS OXIMETRY MLT: CPT

## 2023-07-06 PROCEDURE — 94664 DEMO&/EVAL PT USE INHALER: CPT

## 2023-07-06 PROCEDURE — 25010000002 CEFTRIAXONE SODIUM-DEXTROSE 1-3.74 GM-%(50ML) RECONSTITUTED SOLUTION: Performed by: INTERNAL MEDICINE

## 2023-07-06 PROCEDURE — 25010000002 AZITHROMYCIN PER 500 MG: Performed by: INTERNAL MEDICINE

## 2023-07-06 PROCEDURE — 25010000002 ENOXAPARIN PER 10 MG: Performed by: INTERNAL MEDICINE

## 2023-07-06 PROCEDURE — 85027 COMPLETE CBC AUTOMATED: CPT | Performed by: INTERNAL MEDICINE

## 2023-07-06 RX ORDER — POTASSIUM CHLORIDE 1.5 G/1.58G
20 POWDER, FOR SOLUTION ORAL 2 TIMES DAILY
Status: COMPLETED | OUTPATIENT
Start: 2023-07-06 | End: 2023-07-07

## 2023-07-06 RX ORDER — METHYLPREDNISOLONE SODIUM SUCCINATE 40 MG/ML
20 INJECTION, POWDER, LYOPHILIZED, FOR SOLUTION INTRAMUSCULAR; INTRAVENOUS 2 TIMES DAILY
Status: DISCONTINUED | OUTPATIENT
Start: 2023-07-06 | End: 2023-07-07

## 2023-07-06 RX ADMIN — ZOLPIDEM TARTRATE 5 MG: 5 TABLET ORAL at 21:52

## 2023-07-06 RX ADMIN — IPRATROPIUM BROMIDE AND ALBUTEROL SULFATE 3 ML: .5; 3 SOLUTION RESPIRATORY (INHALATION) at 18:53

## 2023-07-06 RX ADMIN — BUSPIRONE HYDROCHLORIDE 30 MG: 15 TABLET ORAL at 21:52

## 2023-07-06 RX ADMIN — IPRATROPIUM BROMIDE AND ALBUTEROL SULFATE 3 ML: .5; 3 SOLUTION RESPIRATORY (INHALATION) at 07:15

## 2023-07-06 RX ADMIN — PANTOPRAZOLE SODIUM 40 MG: 40 TABLET, DELAYED RELEASE ORAL at 05:13

## 2023-07-06 RX ADMIN — IPRATROPIUM BROMIDE AND ALBUTEROL SULFATE 3 ML: .5; 3 SOLUTION RESPIRATORY (INHALATION) at 13:11

## 2023-07-06 RX ADMIN — FENOFIBRATE 145 MG: 145 TABLET, FILM COATED ORAL at 08:26

## 2023-07-06 RX ADMIN — METHYLPREDNISOLONE SODIUM SUCCINATE 40 MG: 40 INJECTION, POWDER, FOR SOLUTION INTRAMUSCULAR; INTRAVENOUS at 08:26

## 2023-07-06 RX ADMIN — SENNOSIDES AND DOCUSATE SODIUM 1 TABLET: 50; 8.6 TABLET ORAL at 08:28

## 2023-07-06 RX ADMIN — CETIRIZINE HYDROCHLORIDE 10 MG: 10 TABLET, FILM COATED ORAL at 08:27

## 2023-07-06 RX ADMIN — SENNOSIDES AND DOCUSATE SODIUM 1 TABLET: 50; 8.6 TABLET ORAL at 21:52

## 2023-07-06 RX ADMIN — DULOXETINE 30 MG: 30 CAPSULE, DELAYED RELEASE ORAL at 08:27

## 2023-07-06 RX ADMIN — BUSPIRONE HYDROCHLORIDE 30 MG: 15 TABLET ORAL at 08:26

## 2023-07-06 RX ADMIN — ENOXAPARIN SODIUM 40 MG: 100 INJECTION SUBCUTANEOUS at 08:26

## 2023-07-06 RX ADMIN — POTASSIUM CHLORIDE 20 MEQ: 1.5 POWDER, FOR SOLUTION ORAL at 08:27

## 2023-07-06 RX ADMIN — POTASSIUM CHLORIDE 20 MEQ: 1.5 POWDER, FOR SOLUTION ORAL at 21:52

## 2023-07-06 RX ADMIN — LEVOTHYROXINE SODIUM 75 MCG: 75 TABLET ORAL at 05:13

## 2023-07-06 RX ADMIN — CEFTRIAXONE 1 G: 1 INJECTION, SOLUTION INTRAVENOUS at 02:05

## 2023-07-06 RX ADMIN — Medication 10 ML: at 21:56

## 2023-07-06 RX ADMIN — SODIUM CHLORIDE 500 MG: 900 INJECTION, SOLUTION INTRAVENOUS at 04:00

## 2023-07-06 RX ADMIN — METHYLPREDNISOLONE SODIUM SUCCINATE 20 MG: 40 INJECTION, POWDER, FOR SOLUTION INTRAMUSCULAR; INTRAVENOUS at 21:52

## 2023-07-06 RX ADMIN — GABAPENTIN 400 MG: 400 CAPSULE ORAL at 21:52

## 2023-07-06 RX ADMIN — ROSUVASTATIN CALCIUM 20 MG: 20 TABLET, FILM COATED ORAL at 08:26

## 2023-07-06 RX ADMIN — TRAZODONE HYDROCHLORIDE 100 MG: 50 TABLET ORAL at 21:52

## 2023-07-06 NOTE — PLAN OF CARE
Goal Outcome Evaluation:  Plan of Care Reviewed With: patient           Outcome Evaluation: PT continues to desaturate with activity, Attempting to titrate O2. Pt independant with ADL's this shift. Possible DC tomorrow

## 2023-07-06 NOTE — PROGRESS NOTES
[facility]  INTERNAL MEDICINE PROGRESS NOTE    Name:  Leann Antonio   Age:  67 y.o.  Sex:  female  :  1955  MRN:  [id number]   Visit Number:  [id number]  Admission Date:  2023  Date Of Service:  23  Primary Care Physician:  Ulises Coombs MD     LOS: 1 day :  Patient Care Team:  Ulises Coombs MD as PCP - General (Internal Medicine):      Subjective / Interval History:     67-year-old patient with been admitted because of hypoxic respiratory failure, bilateral pneumonia as well as severe hypokalemia  She was started on IV steroids, antibiotics, and bronchodilators.  Her potassium has been replaced and she is feeling a lot better.  Today she states that she has been able to sleep well has some energy and able to stand as she was extremely weak yesterday upon admission  Patient's saturations are stable and vitals are stable as below  Labs have been reviewed and shows improvement      Vital Signs:    Temp:  [97.7 °F (36.5 °C)-98.2 °F (36.8 °C)] 97.9 °F (36.6 °C)  Heart Rate:  [73-90] 73  Resp:  [16-22] 20  BP: (137-163)/(67-85) 147/71    Intake and output:    I/O last 3 completed shifts:  In: 1330 [P.O.:1280; I.V.:50]  Out: -   I/O this shift:  In: 240 [P.O.:240]  Out: -     Physical Examination:    General Appearance:    Alert and cooperative, not in any acute distress.   Head:    Atraumatic and normocephalic, without obvious abnormality.   Eyes:            PERRLA,  No pallor. Extraocular movements are within normal limits.   Neck:   Supple,  No lymph glands, no bruit   Lungs:     Chest shape is normal. Breath sounds heard bilaterally equally.  No crackles or wheezing.     Heart:    Normal S1 and S2, no murmur,  No JVD   Abdomen:     Normal bowel sounds, no masses, no organomegaly. Soft     nontender, no guarding, no rebound tenderness   Extremities:   Moves all extremities well, no edema, no cyanosis,    Skin:   No  bruising or rash.   Neurologic:   Grossly nonfocal and  moves all extremities.      Laboratory results:  Results from last 7 days   Lab Units 07/06/23 0559 07/04/23 2020   SODIUM mmol/L 141 135*   POTASSIUM mmol/L 4.1 2.8*   CHLORIDE mmol/L 109* 98   CO2 mmol/L 22.2 21.5*   BUN mg/dL 10 8   CREATININE mg/dL 0.43* 0.48*   CALCIUM mg/dL 8.3* 9.1   BILIRUBIN mg/dL 0.3 0.6   ALK PHOS U/L 73 95   ALT (SGPT) U/L 9 12   AST (SGOT) U/L 18 35*   GLUCOSE mg/dL 94 98     Results from last 7 days   Lab Units 07/06/23 0559 07/04/23 2020   WBC 10*3/mm3 7.35 9.19   HEMOGLOBIN g/dL 9.5* 11.2*   HEMATOCRIT % 29.9* 33.5*   PLATELETS 10*3/mm3 496* 547*         Results from last 7 days   Lab Units 07/04/23 2212 07/04/23 2020   HSTROP T ng/L 11* 14*     Results from last 7 days   Lab Units 07/05/23  0217 07/05/23 0215   BLOODCX  No growth at 24 hours No growth at 24 hours       Radiology results:    Imaging Results (Last 24 Hours)     ** No results found for the last 24 hours. **          I have reviewed the patient's radiology reports.    Medication Review:     I have reviewed the patients active and prn medications.     Assessment:      Acute respiratory failure with hypoxia    Depression    Hyperlipidemia    Hypothyroidism (acquired)    Bilateral pneumonia          Plan:    1.  Acute hypoxic respiratory failure-this seems to be secondary to pneumonia as well as possible underlying interstitial lung disease.    She is clinically improving and continue with the current management     2.  Bilateral pneumonia-we will cover her with Rocephin and Zithromax at present and follow-up     3.  COPD exacerbation-bronchodilators with IV steroids to be given for 48 hours.  Will taper down the dose today     4.  Hypokalemia -it has been replaced and in the normal limits now    Encourage ambulation and will try to taper oxygen down and if stable she will be discharged in 24 hours    Ulises Coombs MD  07/06/23  09:35 EDT      Please note that portions of this note were completed with a voice  recognition program.

## 2023-07-06 NOTE — CONSULTS
Date of consultation:   July 6, 2023    Requested by:   Dr. Coombs     PCP: Ulises Coombs MD    Reason:  Pneumonia    History of Present Illness:Leann Antonio is a 67 y.o. female past medical history of hypothyroidism, depression presented to the emergency room late moderate shortness of breath with cough for the last few weeks.  She failed outpatient antibiotic treatment, patient does not recall which antibiotic she had.  In the ER patient was found to be hypoxic with a room air sat of 85% which improved with supplemental oxygen.  CT scan was done which showed no pulmonary emboli, but dilated pulmonary arteries.  It did show old calcified granulomatous disease with some mediastinal lymphadenopathy with some mild increase in size.  She also has centrilobular emphysema changes with apical cystic changes and areas of possible fibrosis.  There are some groundglass opacities as well as ill-defined consolidation suggestive of atypical pneumonia.  However these changes are mostly present as well on her CT scan 3 weeks ago.  Her lab work revealed a normal white count and a negative respiratory viral panel.  She was admitted and started on Rocephin and azithromycin as well as steroids and nebulizer treatments.  Overall feeling much better since admission.  She is asking when she can go home.    Of note, patient has never been diagnosed with COPD or emphysema deviously.  She states she did smoke a pack a day for 30 years and then vape for about 10 years.  She is very active involved 3 days/week and walks everywhere including the bank and the grocery store.  When she is at her baseline she denies any significant shortness of breath.    The patient was admitted to the hospital and pulmonary consultation was requested for further recommendations.     Review of System:  All other review of systems negative except indicated in HPI.       Past Medical History:  Past Medical History:   Diagnosis Date   • Depression    • GERD  "(gastroesophageal reflux disease)    • Hyperlipidemia    • Panic attacks    • Pleurisy          Past Surgical History:  Past Surgical History:   Procedure Laterality Date   • CHOLECYSTECTOMY     • HYSTERECTOMY           Family History:  Family History   Problem Relation Age of Onset   • Cirrhosis Mother    • Cirrhosis Brother    • Breast cancer Sister    • Colon cancer Neg Hx          Social History:  Social History     Socioeconomic History   • Marital status:    Tobacco Use   • Smoking status: Former     Packs/day: 0.50     Types: Cigarettes   • Smokeless tobacco: Never   Vaping Use   • Vaping Use: Every day   • Substances: Nicotine   Substance and Sexual Activity   • Alcohol use: No   • Drug use: No   • Sexual activity: Defer         Physical Exam:  /71 (BP Location: Left arm, Patient Position: Lying)   Pulse 73   Temp 97.9 °F (36.6 °C) (Oral)   Resp 20   Ht 160 cm (62.99\")   Wt 67.7 kg (149 lb 4 oz)   SpO2 90%   BMI 26.45 kg/m²    2 L nasal cannula.      Constitutional:Vital signs reviewed           General:  No respiratory distress noted when speaking in complete sentences  Eyes: Extraocular movement was intact, non-icteric sclera  ENT:No nasal discharge noted. Oropharynx is moist and non-erythematous  Neck: Supple.  No JVD noted.Trachea midline  Cardiovascular: S1 + S2.  Regular rate  Lungs/Respiratory: Good air movement.  No wheezing or rhonchi appreciated.  Abdomen/GI: Soft.  Bowel sounds were positive.  No distension noted.  MSK/Extremities: No significant edema noted. No cyanosis noted. No clubbing noted  Neurologic: Awake, alert and oriented x 3.Able to follow simple commands.  Psychiatric: Normal mood and affect. Does not appear anxious on exam  Skin: No rash noted on visible skin. No excessive bruising noted.      Labs: Reviewed.     Results from last 7 days   Lab Units 07/06/23  0559 07/04/23 2020   WBC 10*3/mm3 7.35 9.19   HEMOGLOBIN g/dL 9.5* 11.2*   HEMATOCRIT % 29.9* 33.5* "   PLATELETS 10*3/mm3 496* 547*   NEUTROPHIL % %  --  79.3*   NEUTROS ABS 10*3/mm3  --  7.28*   EOSINOPHIL % %  --  2.0   EOS ABS 10*3/mm3  --  0.18   LYMPHOCYTE % %  --  12.8*   LYMPHS ABS 10*3/mm3  --  1.18       Results from last 7 days   Lab Units 07/06/23  0559 07/04/23 2020   SODIUM mmol/L 141 135*   POTASSIUM mmol/L 4.1 2.8*   CHLORIDE mmol/L 109* 98   CO2 mmol/L 22.2 21.5*   BUN mg/dL 10 8   CREATININE mg/dL 0.43* 0.48*   CALCIUM mg/dL 8.3* 9.1   BILIRUBIN mg/dL 0.3 0.6   ALK PHOS U/L 73 95   ALT (SGPT) U/L 9 12   AST (SGOT) U/L 18 35*   GLUCOSE mg/dL 94 98   TOTAL PROTEIN g/dL 5.6* 6.6   ALBUMIN g/dL 2.6* 3.0*       Results from last 7 days   Lab Units 07/04/23 2020   MAGNESIUM mg/dL 1.6             Lab Results   Component Value Date    PROCALCITO 0.12 07/04/2023    PROCALCITO 1.19 (H) 04/27/2023       Lab Results   Component Value Date    PROBNP 992.4 (H) 07/04/2023       Lab Results   Component Value Date    CRP 29.82 (H) 04/27/2023       Lab Results   Component Value Date    SEDRATE 34 (H) 04/27/2023         Micro: As of July 6, 2023   No results found for: RESPCX  Lab Results   Component Value Date    BLOODCX No growth at 24 hours 07/05/2023    BLOODCX No growth at 24 hours 07/05/2023    BLOODCX No growth at 5 days 04/27/2023     No results found for: URINECX  No results found for: MRSACX  Lab Results   Component Value Date    MRSAPCR No MRSA Detected 04/28/2023     No results found for: URCX  No components found for: LOWRESPCF  No results found for: THROATCX  No results found for: CULTURES  No components found for: STREPBCX  No results found for: STREPPNEUAG  No results found for: LEGIONELLA  No results found for: MYCOPLASCX  No results found for: GCCX  Lab Results   Component Value Date    WOUNDCX Light growth (2+) Staphylococcus aureus (A) 04/25/2023     No results found for: BODYFLDCX    No results found for: FLU    Lab Results   Component Value Date    ADENOVIRUS Not Detected 07/04/2023     Lab  Results   Component Value Date    AF018W Not Detected 07/04/2023     Lab Results   Component Value Date    CVHKU1 Not Detected 07/04/2023     Lab Results   Component Value Date    CVNL63 Not Detected 07/04/2023     Lab Results   Component Value Date    CVOC43 Not Detected 07/04/2023     Lab Results   Component Value Date    HUMETPNEVS Not Detected 07/04/2023     Lab Results   Component Value Date    HURVEV Not Detected 07/04/2023     Lab Results   Component Value Date    FLUBPCR Not Detected 07/04/2023     Lab Results   Component Value Date    PARAINFLUE Not Detected 07/04/2023     Lab Results   Component Value Date    PARAFLUV2 Not Detected 07/04/2023     Lab Results   Component Value Date    PARAFLUV3 Not Detected 07/04/2023     Lab Results   Component Value Date    PARAFLUV4 Not Detected 07/04/2023     Lab Results   Component Value Date    BPERTPCR Not Detected 07/04/2023     No results found for: [id number]  Lab Results   Component Value Date    CPNEUPCR Not Detected 07/04/2023     Lab Results   Component Value Date    MPNEUMO Not Detected 07/04/2023     Lab Results   Component Value Date    FLUAPCR Not Detected 07/04/2023     No results found for: FLUAH3  No results found for: FLUAH1  Lab Results   Component Value Date    RSV Not Detected 07/04/2023     Lab Results   Component Value Date    BPARAPCR Not Detected 07/04/2023       COVID 19:  Lab Results   Component Value Date    COVID19 Not Detected 07/04/2023       No results found for: THCURSCR  No results found for: PCPUR  No results found for: COCAINEUR  No results found for: METAMPSCNUR  No results found for: LABOPIASCN  No results found for: AMPHETSCREEN  No results found for: LABBENZSCN  No results found for: TRICYCLICSCN  No results found for: LABMETHSCN  No results found for: BARBITSCNUR  No results found for: OXYCODONESCN  No results found for: PROPOXSCN  No results found for: BUPRENORSCNU  No results found for: ETHANOLMGDL  No results found for:  ETOHPCT            ABG:   No results found for: PHART, ILA1COW, PO2ART, HGBBG, P1ZNFNLR, CFIO2, FCOHB, CARBOXYHGB, FMETHB    Echo:            Assessment:  1.  Hypoxic respiratory failure  Suspect related to infectious etiology as she has improved with antibiotics.  Would recommend completing 7-day course of antibiotics given increase in size of lymph nodes compared to previous CT scan.  Continue to wean oxygen as tolerated.  If unable to be weaned off oxygen may need to consider 6-minute walk prior to hospital discharge to determine need for supplemental oxygen at home.      2.  Abnormal CT scan of chest  Many of these changes have actually been present on previous CT scan done about 3 weeks ago.  However, there has been increase in size of lymph nodes and we will go ahead and treat empirically for pneumonia despite normal white count and normal procalcitonin level.  There is some suggestion of fibrotic changes as well as emphysema changes.  Once she is over current illness will need outpatient PFTs to determine if she truly has restrictive versus obstructive lung disease.  Patient appears to be very active at baseline and denies any significant shortness of breath when she is in her normal state of health.      Recommend follow-up in pulmonary clinic in follow-up CT scan in 6 to 8 weeks to ensure resolution of infiltrates and lymphadenopathy    3.  Enlarged  pulmonary arteries on CT scan  Could consider echo to rule out underlying pulmonary hypertension if she has significant COPD on PFT's    The plan was discussed with the  patient.      I would like to thank you for the opportunity to participate in the care of this patient.  We will communicate changes and recommendations, if and when necessary.      This document was electronically signed by Allyson Blue MD on 07/06/23 at 10:42 EDT     Dictated utilizing Dragon dictation.

## 2023-07-06 NOTE — PLAN OF CARE
Problem: Adult Inpatient Plan of Care  Goal: Plan of Care Review  Outcome: Ongoing, Progressing  Goal: Patient-Specific Goal (Individualized)  Outcome: Ongoing, Progressing  Goal: Absence of Hospital-Acquired Illness or Injury  Outcome: Ongoing, Progressing  Intervention: Identify and Manage Fall Risk  Recent Flowsheet Documentation  Taken 7/6/2023 0000 by Eleni Reinoso RN  Safety Promotion/Fall Prevention: safety round/check completed  Taken 7/5/2023 2200 by Eleni Reinoso RN  Safety Promotion/Fall Prevention: safety round/check completed  Taken 7/5/2023 2000 by Eleni Reinoso RN  Safety Promotion/Fall Prevention: safety round/check completed  Intervention: Prevent Skin Injury  Recent Flowsheet Documentation  Taken 7/6/2023 0000 by Eleni Reinoso RN  Body Position:   turned   left  Taken 7/5/2023 2200 by Eleni Reinoso RN  Body Position:   turned   right  Taken 7/5/2023 2000 by Eleni Reinoso RN  Body Position: sitting up in bed  Intervention: Prevent and Manage VTE (Venous Thromboembolism) Risk  Recent Flowsheet Documentation  Taken 7/6/2023 0000 by Eleni Reinoso RN  Activity Management: activity encouraged  Taken 7/5/2023 2200 by Eleni Reinoso RN  Activity Management: activity encouraged  Taken 7/5/2023 2000 by Eleni Reinoso RN  Activity Management: activity encouraged  Goal: Optimal Comfort and Wellbeing  Outcome: Ongoing, Progressing  Goal: Readiness for Transition of Care  Outcome: Ongoing, Progressing     Problem: Fall Injury Risk  Goal: Absence of Fall and Fall-Related Injury  Outcome: Ongoing, Progressing  Intervention: Promote Injury-Free Environment  Recent Flowsheet Documentation  Taken 7/6/2023 0000 by Eleni Reinoso RN  Safety Promotion/Fall Prevention: safety round/check completed  Taken 7/5/2023 2200 by Eleni Reinoso RN  Safety Promotion/Fall Prevention: safety round/check completed  Taken 7/5/2023 2000 by Eleni Reinoso RN  Safety Promotion/Fall Prevention: safety round/check  completed     Problem: ARDS (Acute Respiratory Distress Syndrome)  Goal: Effective Oxygenation  Outcome: Ongoing, Progressing   Goal Outcome Evaluation:

## 2023-07-07 PROCEDURE — 94799 UNLISTED PULMONARY SVC/PX: CPT

## 2023-07-07 PROCEDURE — 25010000002 CEFTRIAXONE SODIUM-DEXTROSE 1-3.74 GM-%(50ML) RECONSTITUTED SOLUTION: Performed by: INTERNAL MEDICINE

## 2023-07-07 PROCEDURE — 25010000002 ENOXAPARIN PER 10 MG: Performed by: INTERNAL MEDICINE

## 2023-07-07 RX ORDER — IBUPROFEN 200 MG
400 TABLET ORAL EVERY 8 HOURS SCHEDULED
Status: DISCONTINUED | OUTPATIENT
Start: 2023-07-07 | End: 2023-07-07

## 2023-07-07 RX ORDER — LISINOPRIL 20 MG/1
20 TABLET ORAL
Status: DISCONTINUED | OUTPATIENT
Start: 2023-07-07 | End: 2023-07-08 | Stop reason: HOSPADM

## 2023-07-07 RX ORDER — IBUPROFEN 200 MG
400 TABLET ORAL 3 TIMES DAILY PRN
Status: DISCONTINUED | OUTPATIENT
Start: 2023-07-07 | End: 2023-07-08 | Stop reason: HOSPADM

## 2023-07-07 RX ORDER — IPRATROPIUM BROMIDE AND ALBUTEROL SULFATE 2.5; .5 MG/3ML; MG/3ML
3 SOLUTION RESPIRATORY (INHALATION) EVERY 6 HOURS PRN
Status: DISCONTINUED | OUTPATIENT
Start: 2023-07-07 | End: 2023-07-08 | Stop reason: HOSPADM

## 2023-07-07 RX ORDER — DOXYCYCLINE 100 MG/1
100 CAPSULE ORAL EVERY 12 HOURS SCHEDULED
Status: DISCONTINUED | OUTPATIENT
Start: 2023-07-08 | End: 2023-07-08 | Stop reason: HOSPADM

## 2023-07-07 RX ADMIN — IPRATROPIUM BROMIDE AND ALBUTEROL SULFATE 3 ML: .5; 3 SOLUTION RESPIRATORY (INHALATION) at 00:36

## 2023-07-07 RX ADMIN — IBUPROFEN 400 MG: 200 TABLET, FILM COATED ORAL at 19:05

## 2023-07-07 RX ADMIN — BUSPIRONE HYDROCHLORIDE 30 MG: 15 TABLET ORAL at 20:15

## 2023-07-07 RX ADMIN — Medication 10 ML: at 09:28

## 2023-07-07 RX ADMIN — POTASSIUM CHLORIDE 20 MEQ: 1.5 POWDER, FOR SOLUTION ORAL at 10:17

## 2023-07-07 RX ADMIN — FENOFIBRATE 145 MG: 145 TABLET, FILM COATED ORAL at 09:27

## 2023-07-07 RX ADMIN — LEVOTHYROXINE SODIUM 75 MCG: 75 TABLET ORAL at 05:11

## 2023-07-07 RX ADMIN — LISINOPRIL 20 MG: 20 TABLET ORAL at 10:17

## 2023-07-07 RX ADMIN — BUSPIRONE HYDROCHLORIDE 30 MG: 15 TABLET ORAL at 09:27

## 2023-07-07 RX ADMIN — PANTOPRAZOLE SODIUM 40 MG: 40 TABLET, DELAYED RELEASE ORAL at 05:11

## 2023-07-07 RX ADMIN — TRAZODONE HYDROCHLORIDE 100 MG: 50 TABLET ORAL at 20:16

## 2023-07-07 RX ADMIN — ROSUVASTATIN CALCIUM 20 MG: 20 TABLET, FILM COATED ORAL at 09:26

## 2023-07-07 RX ADMIN — CETIRIZINE HYDROCHLORIDE 10 MG: 10 TABLET, FILM COATED ORAL at 09:26

## 2023-07-07 RX ADMIN — Medication 10 ML: at 20:17

## 2023-07-07 RX ADMIN — ENOXAPARIN SODIUM 40 MG: 100 INJECTION SUBCUTANEOUS at 09:27

## 2023-07-07 RX ADMIN — ZOLPIDEM TARTRATE 5 MG: 5 TABLET ORAL at 20:16

## 2023-07-07 RX ADMIN — CEFTRIAXONE 1 G: 1 INJECTION, SOLUTION INTRAVENOUS at 02:27

## 2023-07-07 RX ADMIN — DULOXETINE 30 MG: 30 CAPSULE, DELAYED RELEASE ORAL at 09:27

## 2023-07-07 RX ADMIN — SENNOSIDES AND DOCUSATE SODIUM 1 TABLET: 50; 8.6 TABLET ORAL at 20:15

## 2023-07-07 RX ADMIN — GABAPENTIN 400 MG: 400 CAPSULE ORAL at 20:16

## 2023-07-07 RX ADMIN — SENNOSIDES AND DOCUSATE SODIUM 1 TABLET: 50; 8.6 TABLET ORAL at 09:25

## 2023-07-07 NOTE — PROGRESS NOTES
[facility]  INTERNAL MEDICINE PROGRESS NOTE    Name:  Leann Antonio   Age:  67 y.o.  Sex:  female  :  1955  MRN:  [id number]   Visit Number:  [id number]  Admission Date:  2023  Date Of Service:  23  Primary Care Physician:  Ulises Coombs MD     LOS: 2 days :  Patient Care Team:  Ulises Coombs MD as PCP - General (Internal Medicine):      Subjective / Interval History:     67-year-old patient with been admitted because of hypoxic respiratory failure, bilateral pneumonia as well as severe hypokalemia  She was started on IV steroids, antibiotics, and bronchodilators.  Her potassium has been replaced and she is feeling a lot better.  Today she states that she has been able to sleep well has some energy and able to stand as she was extremely weak yesterday upon admission    Patient seen on  and she is not feeling well as she has not slept.  She thinks because of the steroids she had poor sleep and has been little hyperactive and anxious.  She is coughing less though there is and oxygen needed to be restarted at 1 L as she did desaturate on room air.      Vital Signs:    Temp:  [97.4 °F (36.3 °C)-98.8 °F (37.1 °C)] 97.4 °F (36.3 °C)  Heart Rate:  [74-92] 80  Resp:  [18-22] 20  BP: (147-166)/(66-85) 166/76    Intake and output:    I/O last 3 completed shifts:  In: 1440 [P.O.:1440]  Out: -   I/O this shift:  In: 480 [P.O.:480]  Out: -     Physical Examination:    General Appearance:    Alert and cooperative, not in any acute distress.   Head:    Atraumatic and normocephalic, without obvious abnormality.   Eyes:            PERRLA,  No pallor. Extraocular movements are within normal limits.   Neck:   Supple,  No lymph glands, no bruit   Lungs:     Chest shape is normal. Breath sounds heard bilaterally equally.  No crackles or wheezing.     Heart:    Normal S1 and S2, no murmur,  No JVD   Abdomen:     Normal bowel sounds, no masses, no organomegaly. Soft     nontender, no  guarding, no rebound tenderness   Extremities:   Moves all extremities well, no edema, no cyanosis,    Skin:   No  bruising or rash.   Neurologic:   Grossly nonfocal and moves all extremities.      Laboratory results:  Results from last 7 days   Lab Units 07/06/23 0559 07/04/23 2020   SODIUM mmol/L 141 135*   POTASSIUM mmol/L 4.1 2.8*   CHLORIDE mmol/L 109* 98   CO2 mmol/L 22.2 21.5*   BUN mg/dL 10 8   CREATININE mg/dL 0.43* 0.48*   CALCIUM mg/dL 8.3* 9.1   BILIRUBIN mg/dL 0.3 0.6   ALK PHOS U/L 73 95   ALT (SGPT) U/L 9 12   AST (SGOT) U/L 18 35*   GLUCOSE mg/dL 94 98     Results from last 7 days   Lab Units 07/06/23 0559 07/04/23 2020   WBC 10*3/mm3 7.35 9.19   HEMOGLOBIN g/dL 9.5* 11.2*   HEMATOCRIT % 29.9* 33.5*   PLATELETS 10*3/mm3 496* 547*         Results from last 7 days   Lab Units 07/04/23  2212 07/04/23 2020   HSTROP T ng/L 11* 14*     Results from last 7 days   Lab Units 07/05/23  0217 07/05/23 0215   BLOODCX  No growth at 2 days No growth at 2 days       Radiology results:    Imaging Results (Last 24 Hours)     ** No results found for the last 24 hours. **          I have reviewed the patient's radiology reports.    Medication Review:     I have reviewed the patients active and prn medications.     Assessment:      Acute respiratory failure with hypoxia    Depression    Hyperlipidemia    Hypothyroidism (acquired)    Bilateral pneumonia          Plan:    1.  Acute hypoxic respiratory failure-this seems to be secondary to pneumonia as well as possible underlying interstitial lung disease.    She is clinically improving and continue with the current management.  We will taper down oxygen as tolerated and likely with possible discharge in 24 hours     2.  Bilateral pneumonia-we will cover her with Rocephin and Zithromax at present and follow-up     3.  COPD exacerbation-bronchodilators with the antibiotic will be given still for infectious etiology but will DC the steroids completely     4.   Hypokalemia -it has been replaced and in the normal limits now    Encourage ambulation and will try to taper oxygen down and if stable she will be discharged in 24 hours    Ulises Coombs MD  07/07/23  09:11 EDT      Please note that portions of this note were completed with a voice recognition program.

## 2023-07-07 NOTE — CASE MANAGEMENT/SOCIAL WORK
Case Management/Social Work    Patient Name:  Leann Antonio  YOB: 1955  MRN: [id number]  Admit Date:  7/4/2023        Pt not medically ready for dc at this time. Plan is home. SW asked for RW order. Pt trying to wean off home O2. Pt signed IMM.       Electronically signed by:  DELANO Lynch  07/07/23 13:44 EDT

## 2023-07-07 NOTE — PLAN OF CARE
Problem: Adult Inpatient Plan of Care  Goal: Plan of Care Review  Outcome: Ongoing, Progressing  Goal: Patient-Specific Goal (Individualized)  Outcome: Ongoing, Progressing  Goal: Absence of Hospital-Acquired Illness or Injury  Outcome: Ongoing, Progressing  Intervention: Identify and Manage Fall Risk  Recent Flowsheet Documentation  Taken 7/6/2023 2200 by Eleni Reinoso RN  Safety Promotion/Fall Prevention: safety round/check completed  Taken 7/6/2023 2000 by Eleni Reinoso RN  Safety Promotion/Fall Prevention: safety round/check completed  Intervention: Prevent Skin Injury  Recent Flowsheet Documentation  Taken 7/6/2023 2200 by Eleni Reinoso RN  Body Position: supine, legs elevated  Taken 7/6/2023 2000 by Eleni Rienoso RN  Body Position: supine, legs elevated  Intervention: Prevent and Manage VTE (Venous Thromboembolism) Risk  Recent Flowsheet Documentation  Taken 7/6/2023 2200 by Eleni Reinoso RN  Activity Management: up ad obi  Taken 7/6/2023 2000 by Eleni Reinoso RN  Activity Management: up ad obi  Range of Motion: active ROM (range of motion) encouraged  Goal: Optimal Comfort and Wellbeing  Outcome: Ongoing, Progressing  Goal: Readiness for Transition of Care  Outcome: Ongoing, Progressing     Problem: Fall Injury Risk  Goal: Absence of Fall and Fall-Related Injury  Outcome: Ongoing, Progressing  Intervention: Promote Injury-Free Environment  Recent Flowsheet Documentation  Taken 7/6/2023 2200 by Eleni Reinoso RN  Safety Promotion/Fall Prevention: safety round/check completed  Taken 7/6/2023 2000 by Eleni Reinoso RN  Safety Promotion/Fall Prevention: safety round/check completed     Problem: ARDS (Acute Respiratory Distress Syndrome)  Goal: Effective Oxygenation  Outcome: Ongoing, Progressing     Problem: Gas Exchange Impaired  Goal: Optimal Gas Exchange  Outcome: Ongoing, Progressing  Intervention: Optimize Oxygenation and Ventilation  Recent Flowsheet Documentation  Taken 7/6/2023 2200 by  Eleni Reinoso, RN  Head of Bed ([facility]) Positioning: HOB elevated  Taken 7/6/2023 2000 by Eleni Reinoso, RN  Head of Bed ([facility]) Positioning: HOB elevated   Goal Outcome Evaluation:

## 2023-07-07 NOTE — PLAN OF CARE
Goal Outcome Evaluation:  Plan of Care Reviewed With: patient     Pts BP slightly elevated during shift, MD aware, last recording being 166/80, no new orders. Pt has been NSR on telemetry throughout the shift and maintaining o2 >90% on 1.5 L NC at the time of note. Medications administered per MAR and orders. Discharge is pending for tomorrow.        Problem: Adult Inpatient Plan of Care  Goal: Plan of Care Review  Outcome: Ongoing, Progressing  Flowsheets (Taken 7/7/2023 1617)  Plan of Care Reviewed With: patient  Goal: Patient-Specific Goal (Individualized)  Outcome: Ongoing, Progressing  Goal: Absence of Hospital-Acquired Illness or Injury  Outcome: Ongoing, Progressing  Intervention: Identify and Manage Fall Risk  Recent Flowsheet Documentation  Taken 7/7/2023 1602 by Robina Codrova RN  Safety Promotion/Fall Prevention:   activity supervised   assistive device/personal items within reach   clutter free environment maintained   toileting scheduled   safety round/check completed   room organization consistent   nonskid shoes/slippers when out of bed   lighting adjusted   fall prevention program maintained  Taken 7/7/2023 1410 by Robina Cordova, ALAN  Safety Promotion/Fall Prevention:   activity supervised   assistive device/personal items within reach   clutter free environment maintained   toileting scheduled   room organization consistent   safety round/check completed   lighting adjusted   fall prevention program maintained   nonskid shoes/slippers when out of bed  Taken 7/7/2023 1130 by Robina Cordova, RN  Safety Promotion/Fall Prevention:   activity supervised   assistive device/personal items within reach   clutter free environment maintained   toileting scheduled   safety round/check completed   room organization consistent   nonskid shoes/slippers when out of bed   lighting adjusted   fall prevention program maintained  Taken 7/7/2023 1010 by Robina Cordova, RN  Safety Promotion/Fall Prevention:   activity  supervised   assistive device/personal items within reach   clutter free environment maintained   toileting scheduled   safety round/check completed   room organization consistent   nonskid shoes/slippers when out of bed   lighting adjusted   fall prevention program maintained  Taken 7/7/2023 0917 by Robina Cordova RN  Safety Promotion/Fall Prevention:   activity supervised   assistive device/personal items within reach   clutter free environment maintained   toileting scheduled   safety round/check completed   room organization consistent   lighting adjusted   fall prevention program maintained   nonskid shoes/slippers when out of bed  Intervention: Prevent Skin Injury  Recent Flowsheet Documentation  Taken 7/7/2023 1602 by Robina Cordova RN  Body Position: supine  Taken 7/7/2023 1410 by Robina Cordova RN  Skin Protection:   adhesive use limited   tubing/devices free from skin contact  Taken 7/7/2023 1130 by Robina Cordova RN  Body Position:   position changed independently   turned   right  Taken 7/7/2023 1010 by Robina Cordova RN  Skin Protection:   adhesive use limited   tubing/devices free from skin contact   protective footwear used   incontinence pads utilized  Taken 7/7/2023 0917 by Robina Cordova RN  Skin Protection:   adhesive use limited   tubing/devices free from skin contact  Intervention: Prevent and Manage VTE (Venous Thromboembolism) Risk  Recent Flowsheet Documentation  Taken 7/7/2023 1602 by Robina Cordova RN  Activity Management: up ad obi  Taken 7/7/2023 1410 by Robina Cordova RN  Activity Management: up ad obi  Taken 7/7/2023 1130 by Robina Cordova RN  Activity Management: up ad obi  Taken 7/7/2023 1010 by Robina Cordova RN  Activity Management: up ad obi  Taken 7/7/2023 0917 by Robina Cordova RN  Activity Management: up ad obi  Range of Motion: active ROM (range of motion) encouraged  Intervention: Prevent Infection  Recent Flowsheet Documentation  Taken 7/7/2023 1602 by Robina Cordova  RN  Infection Prevention:   single patient room provided   rest/sleep promoted   hand hygiene promoted   equipment surfaces disinfected   environmental surveillance performed  Taken 7/7/2023 1130 by Robina Cordova RN  Infection Prevention:   single patient room provided   rest/sleep promoted   hand hygiene promoted   equipment surfaces disinfected   environmental surveillance performed  Taken 7/7/2023 1010 by Robina Cordova RN  Infection Prevention:   single patient room provided   rest/sleep promoted   hand hygiene promoted   equipment surfaces disinfected   environmental surveillance performed  Taken 7/7/2023 0917 by Robina Cordova RN  Infection Prevention:   single patient room provided   rest/sleep promoted   hand hygiene promoted   equipment surfaces disinfected   environmental surveillance performed  Goal: Optimal Comfort and Wellbeing  Outcome: Ongoing, Progressing  Intervention: Monitor Pain and Promote Comfort  Recent Flowsheet Documentation  Taken 7/7/2023 1602 by Robina Cordova RN  Pain Management Interventions:   position adjusted   pillow support provided  Taken 7/7/2023 0917 by Robina Cordova RN  Pain Management Interventions:   position adjusted   pillow support provided  Intervention: Provide Person-Centered Care  Recent Flowsheet Documentation  Taken 7/7/2023 0917 by Robina Cordova RN  Trust Relationship/Rapport:   care explained   choices provided   emotional support provided   questions answered   empathic listening provided   questions encouraged   reassurance provided   thoughts/feelings acknowledged  Goal: Readiness for Transition of Care  Outcome: Ongoing, Progressing     Problem: Fall Injury Risk  Goal: Absence of Fall and Fall-Related Injury  Outcome: Ongoing, Progressing  Intervention: Identify and Manage Contributors  Recent Flowsheet Documentation  Taken 7/7/2023 1602 by Robina Cordova RN  Medication Review/Management: medications reviewed  Self-Care Promotion:   independence encouraged    BADL personal objects within reach   BADL personal routines maintained   meal set-up provided  Taken 7/7/2023 1410 by Robina Cordova RN  Medication Review/Management: medications reviewed  Taken 7/7/2023 1130 by Robina Cordova RN  Medication Review/Management: medications reviewed  Self-Care Promotion:   independence encouraged   BADL personal objects within reach   BADL personal routines maintained   meal set-up provided  Taken 7/7/2023 0917 by Robina Cordova RN  Medication Review/Management: medications reviewed  Self-Care Promotion:   independence encouraged   BADL personal objects within reach   BADL personal routines maintained   meal set-up provided  Intervention: Promote Injury-Free Environment  Recent Flowsheet Documentation  Taken 7/7/2023 1602 by Robina Cordova RN  Safety Promotion/Fall Prevention:   activity supervised   assistive device/personal items within reach   clutter free environment maintained   toileting scheduled   safety round/check completed   room organization consistent   nonskid shoes/slippers when out of bed   lighting adjusted   fall prevention program maintained  Taken 7/7/2023 1410 by Robina Cordova RN  Safety Promotion/Fall Prevention:   activity supervised   assistive device/personal items within reach   clutter free environment maintained   toileting scheduled   room organization consistent   safety round/check completed   lighting adjusted   fall prevention program maintained   nonskid shoes/slippers when out of bed  Taken 7/7/2023 1130 by Robina Cordova RN  Safety Promotion/Fall Prevention:   activity supervised   assistive device/personal items within reach   clutter free environment maintained   toileting scheduled   safety round/check completed   room organization consistent   nonskid shoes/slippers when out of bed   lighting adjusted   fall prevention program maintained  Taken 7/7/2023 1010 by Robina Cordova RN  Safety Promotion/Fall Prevention:   activity supervised    assistive device/personal items within reach   clutter free environment maintained   toileting scheduled   safety round/check completed   room organization consistent   nonskid shoes/slippers when out of bed   lighting adjusted   fall prevention program maintained  Taken 7/7/2023 0917 by Robina Cordova RN  Safety Promotion/Fall Prevention:   activity supervised   assistive device/personal items within reach   clutter free environment maintained   toileting scheduled   safety round/check completed   room organization consistent   lighting adjusted   fall prevention program maintained   nonskid shoes/slippers when out of bed     Problem: ARDS (Acute Respiratory Distress Syndrome)  Goal: Effective Oxygenation  Outcome: Ongoing, Progressing  Intervention: Optimize Oxygenation, Ventilation and Perfusion  Recent Flowsheet Documentation  Taken 7/7/2023 1602 by Robina Cordova RN  Airway/Ventilation Management:   airway patency maintained   pulmonary hygiene promoted  Taken 7/7/2023 1130 by Robina Cordova RN  Airway/Ventilation Management:   airway patency maintained   pulmonary hygiene promoted  Taken 7/7/2023 0917 by Robina Cordova RN  Airway/Ventilation Management:   airway patency maintained   pulmonary hygiene promoted     Problem: Gas Exchange Impaired  Goal: Optimal Gas Exchange  Outcome: Ongoing, Progressing  Intervention: Optimize Oxygenation and Ventilation  Recent Flowsheet Documentation  Taken 7/7/2023 1602 by Robina Cordova RN  Head of Bed (HOB) Positioning: HOB at 30 degrees  Airway/Ventilation Management:   airway patency maintained   pulmonary hygiene promoted  Taken 7/7/2023 1130 by Robina Cordova RN  Head of Bed (HOB) Positioning: HOB at 30 degrees  Airway/Ventilation Management:   airway patency maintained   pulmonary hygiene promoted  Taken 7/7/2023 1010 by Robina Cordova RN  Head of Bed ([facility]) Positioning: HOB at 30 degrees  Taken 7/7/2023 0917 by Robina Cordova RN  Head of Bed ([facility]OB) Positioning: HOB at  30 degrees  Airway/Ventilation Management:   airway patency maintained   pulmonary hygiene promoted

## 2023-07-08 VITALS
SYSTOLIC BLOOD PRESSURE: 171 MMHG | HEART RATE: 74 BPM | HEIGHT: 63 IN | BODY MASS INDEX: 27.5 KG/M2 | WEIGHT: 155.2 LBS | TEMPERATURE: 98.1 F | DIASTOLIC BLOOD PRESSURE: 76 MMHG | OXYGEN SATURATION: 90 % | RESPIRATION RATE: 18 BRPM

## 2023-07-08 PROCEDURE — 25010000002 ENOXAPARIN PER 10 MG: Performed by: INTERNAL MEDICINE

## 2023-07-08 PROCEDURE — 94618 PULMONARY STRESS TESTING: CPT

## 2023-07-08 PROCEDURE — 94799 UNLISTED PULMONARY SVC/PX: CPT

## 2023-07-08 PROCEDURE — 25010000002 CEFTRIAXONE SODIUM-DEXTROSE 1-3.74 GM-%(50ML) RECONSTITUTED SOLUTION: Performed by: INTERNAL MEDICINE

## 2023-07-08 RX ORDER — LISINOPRIL 20 MG/1
20 TABLET ORAL
Qty: 30 TABLET | Refills: 0 | Status: SHIPPED | OUTPATIENT
Start: 2023-07-09

## 2023-07-08 RX ORDER — DOXYCYCLINE 100 MG/1
100 CAPSULE ORAL EVERY 12 HOURS SCHEDULED
Qty: 9 CAPSULE | Refills: 0 | Status: SHIPPED | OUTPATIENT
Start: 2023-07-08 | End: 2023-07-13

## 2023-07-08 RX ORDER — ALBUTEROL SULFATE 90 UG/1
2 AEROSOL, METERED RESPIRATORY (INHALATION) EVERY 6 HOURS PRN
Qty: 8 G | Refills: 0 | Status: SHIPPED | OUTPATIENT
Start: 2023-07-08

## 2023-07-08 RX ADMIN — LEVOTHYROXINE SODIUM 75 MCG: 75 TABLET ORAL at 06:00

## 2023-07-08 RX ADMIN — DOXYCYCLINE 100 MG: 100 CAPSULE ORAL at 09:01

## 2023-07-08 RX ADMIN — BUSPIRONE HYDROCHLORIDE 30 MG: 15 TABLET ORAL at 09:02

## 2023-07-08 RX ADMIN — FENOFIBRATE 145 MG: 145 TABLET, FILM COATED ORAL at 09:02

## 2023-07-08 RX ADMIN — CETIRIZINE HYDROCHLORIDE 10 MG: 10 TABLET, FILM COATED ORAL at 09:02

## 2023-07-08 RX ADMIN — CEFTRIAXONE 1 G: 1 INJECTION, SOLUTION INTRAVENOUS at 01:51

## 2023-07-08 RX ADMIN — SENNOSIDES AND DOCUSATE SODIUM 1 TABLET: 50; 8.6 TABLET ORAL at 09:04

## 2023-07-08 RX ADMIN — DULOXETINE 30 MG: 30 CAPSULE, DELAYED RELEASE ORAL at 09:02

## 2023-07-08 RX ADMIN — GABAPENTIN 400 MG: 400 CAPSULE ORAL at 09:02

## 2023-07-08 RX ADMIN — ROSUVASTATIN CALCIUM 20 MG: 20 TABLET, FILM COATED ORAL at 09:02

## 2023-07-08 RX ADMIN — PANTOPRAZOLE SODIUM 40 MG: 40 TABLET, DELAYED RELEASE ORAL at 06:00

## 2023-07-08 RX ADMIN — ENOXAPARIN SODIUM 40 MG: 100 INJECTION SUBCUTANEOUS at 09:02

## 2023-07-08 RX ADMIN — LISINOPRIL 20 MG: 20 TABLET ORAL at 09:01

## 2023-07-08 NOTE — PLAN OF CARE
Problem: Adult Inpatient Plan of Care  Goal: Plan of Care Review  Outcome: Ongoing, Progressing  Goal: Patient-Specific Goal (Individualized)  Outcome: Ongoing, Progressing  Goal: Absence of Hospital-Acquired Illness or Injury  Outcome: Ongoing, Progressing  Intervention: Identify and Manage Fall Risk  Recent Flowsheet Documentation  Taken 7/7/2023 2200 by Eleni Reinoso RN  Safety Promotion/Fall Prevention: safety round/check completed  Taken 7/7/2023 2000 by Eleni Reinoso RN  Safety Promotion/Fall Prevention: safety round/check completed  Intervention: Prevent Skin Injury  Recent Flowsheet Documentation  Taken 7/7/2023 2200 by Eleni Reinoso RN  Body Position:   right   tilted  Taken 7/7/2023 2000 by Eleni Reinoso RN  Body Position:   right   side-lying  Intervention: Prevent and Manage VTE (Venous Thromboembolism) Risk  Recent Flowsheet Documentation  Taken 7/7/2023 2200 by Eleni Reinoso RN  Activity Management: up ad obi  Taken 7/7/2023 2000 by Eleni Reinoso RN  Activity Management: up ad obi  Intervention: Prevent Infection  Recent Flowsheet Documentation  Taken 7/7/2023 2200 by Eleni Reinoso RN  Infection Prevention: environmental surveillance performed  Taken 7/7/2023 2000 by Eleni Reinsoo RN  Infection Prevention: environmental surveillance performed  Goal: Optimal Comfort and Wellbeing  Outcome: Ongoing, Progressing  Goal: Readiness for Transition of Care  Outcome: Ongoing, Progressing     Problem: Fall Injury Risk  Goal: Absence of Fall and Fall-Related Injury  Outcome: Ongoing, Progressing  Intervention: Promote Injury-Free Environment  Recent Flowsheet Documentation  Taken 7/7/2023 2200 by Eleni Reinoso RN  Safety Promotion/Fall Prevention: safety round/check completed  Taken 7/7/2023 2000 by Eleni Reinoso RN  Safety Promotion/Fall Prevention: safety round/check completed     Problem: ARDS (Acute Respiratory Distress Syndrome)  Goal: Effective Oxygenation  Outcome: Ongoing,  Progressing     Problem: Gas Exchange Impaired  Goal: Optimal Gas Exchange  Outcome: Ongoing, Progressing  Intervention: Optimize Oxygenation and Ventilation  Recent Flowsheet Documentation  Taken 7/7/2023 2200 by Eleni Reinoso, RN  Head of Bed ([facility]) Positioning: [facility] elevated  Taken 7/7/2023 2000 by Eleni Reinoso, RN  Head of Bed ([facility]) Positioning: [facility] elevated   Goal Outcome Evaluation:

## 2023-07-08 NOTE — PROGRESS NOTES
Exercise Oximetry    Patient Name:Leann Antonio   MRN: [id number]   Date: 07/08/23             ROOM AIR BASELINE   SpO2% 92   Heart Rate 81   Blood Pressure      EXERCISE ON ROOM AIR SpO2% EXERCISE ON O2 @  LPM SpO2%   1 MINUTE 91 1 MINUTE    2 MINUTES 93 2 MINUTES    3 MINUTES 94 3 MINUTES    4 MINUTES 91 4 MINUTES    5 MINUTES 88 5 MINUTES    6 MINUTES  6 MINUTES                          2 91              Distance Walked    Distance Walked   Dyspnea (Dmitry Scale)    4 Dyspnea (Dmitry Scale)   Fatigue (Dmitry Scale)       4 Fatigue (Dmitry Scale)   SpO2% Post Exercise  92 SpO2% Post Exercise   HR Post Exercise    84 HR Post Exercise   Time to Recovery   Time to Recovery     Comments: pt requires nc 2lpm on exertion only

## 2023-07-08 NOTE — CASE MANAGEMENT/SOCIAL WORK
Case Management/Social Work    Patient Name:  Leann Antonio  YOB: 1955  MRN: [id number]  Admit Date:  7/4/2023      Terrance spoke to Clif at [facility] regarding pt needing oxygen set up.  Clif states he will deliver it to the hospital asap.  Walker delivered to pt in room. Verbalized no other needs at this time. States she is ready to go home.        Electronically signed by:  Nahomi Alcaraz  07/08/23 14:46 EDT

## 2023-07-08 NOTE — PAYOR COMM NOTE
"TO:BC  FROM:JIE OLIVER, RN PHONE [phone]  UPDATED CLINICALS REF#[id number]    David Antonio (67 y.o. Female)       Date of Birth   1955    Social Security Number       Address   [address] [place] [address] [address] [facility] [address]    Home Phone   [phone]    MRN   [id number]       Alevism   Pentecostalism    Marital Status                               Admission Date   7/4/23    Admission Type   Emergency    Admitting Provider   Ulises Coombs MD    Attending Provider   Ulises Coombs MD    Department, Room/Bed   [facility] TELEMETRY 3, 306/1       Discharge Date       Discharge Disposition       Discharge Destination                                 Attending Provider: Ulises Coombs MD    Allergies: Penicillins    Isolation: None   Infection: None   Code Status: CPR    Ht: 160 cm (62.99\")   Wt: 70.4 kg (155 lb 3.3 oz)    Admission Cmt: None   Principal Problem: Acute respiratory failure with hypoxia [J96.01]                   Active Insurance as of 7/4/2023       Primary Coverage       Payor Plan Insurance Group Employer/Plan Group    ANTHEM MEDICARE REPLACEMENT ANTHEM MEDICARE ADVANTAGE [id number]       Payor Plan Address Payor Plan Phone Number Payor Plan Fax Number Effective Dates    [address] [phone]  1/1/2021 - None Entered    [facility] [phone]         Subscriber Name Subscriber Birth Date Member ID       DAVID ANTONIO 1955 [id number]               Secondary Coverage       Payor Plan Insurance Group Employer/Plan Group    [place]        Payor Plan Address Payor Plan Phone Number Payor Plan Fax Number Effective Dates    [address]   1/1/2014 - None Entered    [facility] [address]         Subscriber Name Subscriber Birth Date Member ID       DAVID ANTONIO 1955 [id number]                     Emergency Contacts        (Rel.) Home Phone Work Phone Mobile Phone "    Zabrina Lambert (Daughter) [phone] -- --              Vital Signs (last day)       Date/Time Temp Temp src Pulse Resp BP Patient Position SpO2    07/08/23 0714 97.9 (36.6) Oral 75 18 164/83 Sitting 91    07/08/23 0149 -- -- -- -- -- -- 93    07/08/23 0148 97.8 (36.6) Oral 72 20 163/77 Lying --    07/07/23 1905 98.3 (36.8) Oral 73 20 166/79 Lying 90    07/07/23 1600 -- -- -- -- 166/80 -- --    07/07/23 1518 98.2 (36.8) Oral -- 20 170/78 Lying --    07/07/23 1310 -- -- -- -- 155/76 Lying --    07/07/23 1155 97.9 (36.6) Oral -- 22 168/81 Sitting --    07/07/23 1017 -- -- 76 -- -- -- --    07/07/23 0714 97.4 (36.3) Oral -- 20 166/76 Lying 90    07/07/23 0344 98.8 (37.1) Oral 80 22 157/75 Lying 93    07/07/23 0043 -- -- 78 20 -- -- --    07/07/23 0036 -- -- 77 20 -- -- 90          Current Facility-Administered Medications   Medication Dose Route Frequency Provider Last Rate Last Admin    busPIRone (BUSPAR) tablet 30 mg  30 mg Oral BID Ulises Coombs MD   30 mg at 07/08/23 0902    cetirizine (zyrTEC) tablet 10 mg  10 mg Oral Daily Ulises Coombs MD   10 mg at 07/08/23 0902    doxycycline (MONODOX) capsule 100 mg  100 mg Oral Q12H Ulises Coombs MD   100 mg at 07/08/23 0901    DULoxetine (CYMBALTA) DR capsule 30 mg  30 mg Oral Daily Ulises Coombs MD   30 mg at 07/08/23 0902    Enoxaparin Sodium (LOVENOX) syringe 40 mg  40 mg Subcutaneous Daily Ulises Coombs MD   40 mg at 07/08/23 0902    fenofibrate (TRICOR) tablet 145 mg  145 mg Oral Daily Ulises Coombs MD   145 mg at 07/08/23 0902    gabapentin (NEURONTIN) capsule 400 mg  400 mg Oral Q12H Ulises Coombs MD   400 mg at 07/08/23 0902    ibuprofen (ADVIL,MOTRIN) tablet 400 mg  400 mg Oral TID PRN Ulises Coombs MD   400 mg at 07/07/23 1905    ipratropium-albuterol (DUO-NEB) nebulizer solution 3 mL  3 mL Nebulization Q6H PRN Ulises Coombs MD        levothyroxine (SYNTHROID, LEVOTHROID) tablet 75 mcg  75 mcg Oral Daily Ulises Coombs MD   75 mcg  at 23 0600    lisinopril (PRINIVIL,ZESTRIL) tablet 20 mg  20 mg Oral Q24H Ulises Coombs MD   20 mg at 23 0901    pantoprazole (PROTONIX) EC tablet 40 mg  40 mg Oral Q AM Ulises Coombs MD   40 mg at 23 0600    rosuvastatin (CRESTOR) tablet 20 mg  20 mg Oral Daily Ulises Coombs MD   20 mg at 23 0902    sennosides-docusate (PERICOLACE) 8.6-50 MG per tablet 1 tablet  1 tablet Oral BID Ulises Coombs MD   1 tablet at 23 0904    sodium chloride 0.9 % flush 10 mL  10 mL Intravenous PRN Ulises Coombs MD   10 mL at 23    traZODone (DESYREL) tablet 100 mg  100 mg Oral Nightly Ulises Coombs MD   100 mg at 23    zolpidem (AMBIEN) tablet 5 mg  5 mg Oral Nightly PRN Ulises Coombs MD   5 mg at 23     Lab Results (last 24 hours)       Procedure Component Value Units Date/Time    Blood Culture With TYRON - Blood, Hand, Right [[id number]]  (Normal) Collected: 23 021    Specimen: Blood from Hand, Right Updated: 23 0230     Blood Culture No growth at 3 days    Blood Culture With TYRON - Blood, Arm, Right [[id number]]  (Normal) Collected: 23 0215    Specimen: Blood from Arm, Right Updated: 23 0230     Blood Culture No growth at 3 days          Imaging Results (Last 24 Hours)       ** No results found for the last 24 hours. **          Operative/Procedure Notes (last 24 hours)  Notes from 23 1016 through 23 1016   No notes of this type exist for this encounter.          Physician Progress Notes (last 48 hours)        Ulises Coombs MD at 23 0911                [facility]  INTERNAL MEDICINE PROGRESS NOTE    Name:  Leann Antonio   Age:  67 y.o.  Sex:  female  :  1955  MRN:  [id number]   Visit Number:  [id number]  Admission Date:  2023  Date Of Service:  23  Primary Care Physician:  Ulises Coombs MD     LOS: 2 days :  Patient Care Team:  Ulises Coombs MD as PCP - General  (Internal Medicine):      Subjective / Interval History:     67-year-old patient with been admitted because of hypoxic respiratory failure, bilateral pneumonia as well as severe hypokalemia  She was started on IV steroids, antibiotics, and bronchodilators.  Her potassium has been replaced and she is feeling a lot better.  Today she states that she has been able to sleep well has some energy and able to stand as she was extremely weak yesterday upon admission    Patient seen on July 7 and she is not feeling well as she has not slept.  She thinks because of the steroids she had poor sleep and has been little hyperactive and anxious.  She is coughing less though there is and oxygen needed to be restarted at 1 L as she did desaturate on room air.      Vital Signs:    Temp:  [97.4 °F (36.3 °C)-98.8 °F (37.1 °C)] 97.4 °F (36.3 °C)  Heart Rate:  [74-92] 80  Resp:  [18-22] 20  BP: (147-166)/(66-85) 166/76    Intake and output:    I/O last 3 completed shifts:  In: 1440 [P.O.:1440]  Out: -   I/O this shift:  In: 480 [P.O.:480]  Out: -     Physical Examination:    General Appearance:    Alert and cooperative, not in any acute distress.   Head:    Atraumatic and normocephalic, without obvious abnormality.   Eyes:            PERRLA,  No pallor. Extraocular movements are within normal limits.   Neck:   Supple,  No lymph glands, no bruit   Lungs:     Chest shape is normal. Breath sounds heard bilaterally equally.  No crackles or wheezing.     Heart:    Normal S1 and S2, no murmur,  No JVD   Abdomen:     Normal bowel sounds, no masses, no organomegaly. Soft     nontender, no guarding, no rebound tenderness   Extremities:   Moves all extremities well, no edema, no cyanosis,    Skin:   No  bruising or rash.   Neurologic:   Grossly nonfocal and moves all extremities.      Laboratory results:  Results from last 7 days   Lab Units 07/06/23 0559 07/04/23 2020   SODIUM mmol/L 141 135*   POTASSIUM mmol/L 4.1 2.8*   CHLORIDE mmol/L 109*  98   CO2 mmol/L 22.2 21.5*   BUN mg/dL 10 8   CREATININE mg/dL 0.43* 0.48*   CALCIUM mg/dL 8.3* 9.1   BILIRUBIN mg/dL 0.3 0.6   ALK PHOS U/L 73 95   ALT (SGPT) U/L 9 12   AST (SGOT) U/L 18 35*   GLUCOSE mg/dL 94 98     Results from last 7 days   Lab Units 07/06/23  0559 07/04/23 2020   WBC 10*3/mm3 7.35 9.19   HEMOGLOBIN g/dL 9.5* 11.2*   HEMATOCRIT % 29.9* 33.5*   PLATELETS 10*3/mm3 496* 547*         Results from last 7 days   Lab Units 07/04/23  2212 07/04/23 2020   HSTROP T ng/L 11* 14*     Results from last 7 days   Lab Units 07/05/23  0217 07/05/23  0215   BLOODCX  No growth at 2 days No growth at 2 days       Radiology results:    Imaging Results (Last 24 Hours)       ** No results found for the last 24 hours. **            I have reviewed the patient's radiology reports.    Medication Review:     I have reviewed the patients active and prn medications.     Assessment:      Acute respiratory failure with hypoxia    Depression    Hyperlipidemia    Hypothyroidism (acquired)    Bilateral pneumonia          Plan:    1.  Acute hypoxic respiratory failure-this seems to be secondary to pneumonia as well as possible underlying interstitial lung disease.    She is clinically improving and continue with the current management.  We will taper down oxygen as tolerated and likely with possible discharge in 24 hours     2.  Bilateral pneumonia-we will cover her with Rocephin and Zithromax at present and follow-up     3.  COPD exacerbation-bronchodilators with the antibiotic will be given still for infectious etiology but will DC the steroids completely     4.  Hypokalemia -it has been replaced and in the normal limits now    Encourage ambulation and will try to taper oxygen down and if stable she will be discharged in 24 hours    Ulises Coombs MD  07/07/23  09:11 EDT      Please note that portions of this note were completed with a voice recognition program.      Electronically signed by Ulises Coombs MD at 07/07/23  0913          Consult Notes (last 48 hours)        Allyson Blue MD at 07/06/23 1041        Consult Orders    1. Inpatient Pulmonology Consult [[id number]] ordered by Ulises Coombs MD at 07/05/23 0707                 Date of consultation:   July 6, 2023    Requested by:   Dr. Coombs     PCP: Ulises Coombs MD    Reason:  Pneumonia    History of Present Illness:Leann Antonio is a 67 y.o. female past medical history of hypothyroidism, depression presented to the emergency room late moderate shortness of breath with cough for the last few weeks.  She failed outpatient antibiotic treatment, patient does not recall which antibiotic she had.  In the ER patient was found to be hypoxic with a room air sat of 85% which improved with supplemental oxygen.  CT scan was done which showed no pulmonary emboli, but dilated pulmonary arteries.  It did show old calcified granulomatous disease with some mediastinal lymphadenopathy with some mild increase in size.  She also has centrilobular emphysema changes with apical cystic changes and areas of possible fibrosis.  There are some groundglass opacities as well as ill-defined consolidation suggestive of atypical pneumonia.  However these changes are mostly present as well on her CT scan 3 weeks ago.  Her lab work revealed a normal white count and a negative respiratory viral panel.  She was admitted and started on Rocephin and azithromycin as well as steroids and nebulizer treatments.  Overall feeling much better since admission.  She is asking when she can go home.    Of note, patient has never been diagnosed with COPD or emphysema deviously.  She states she did smoke a pack a day for 30 years and then vape for about 10 years.  She is very active involved 3 days/week and walks everywhere including the bank and the grocery store.  When she is at her baseline she denies any significant shortness of breath.    The patient was admitted to the hospital and pulmonary  "consultation was requested for further recommendations.     Review of System:  All other review of systems negative except indicated in HPI.       Past Medical History:  Past Medical History:   Diagnosis Date    Depression     GERD (gastroesophageal reflux disease)     Hyperlipidemia     Panic attacks     Pleurisy          Past Surgical History:  Past Surgical History:   Procedure Laterality Date    CHOLECYSTECTOMY      HYSTERECTOMY           Family History:  Family History   Problem Relation Age of Onset    Cirrhosis Mother     Cirrhosis Brother     Breast cancer Sister     Colon cancer Neg Hx          Social History:  Social History     Socioeconomic History    Marital status:    Tobacco Use    Smoking status: Former     Packs/day: 0.50     Types: Cigarettes    Smokeless tobacco: Never   Vaping Use    Vaping Use: Every day    Substances: Nicotine   Substance and Sexual Activity    Alcohol use: No    Drug use: No    Sexual activity: Defer         Physical Exam:  /71 (BP Location: Left arm, Patient Position: Lying)   Pulse 73   Temp 97.9 °F (36.6 °C) (Oral)   Resp 20   Ht 160 cm (62.99\")   Wt 67.7 kg (149 lb 4 oz)   SpO2 90%   BMI 26.45 kg/m²    2 L nasal cannula.      Constitutional:Vital signs reviewed           General:  No respiratory distress noted when speaking in complete sentences  Eyes: Extraocular movement was intact, non-icteric sclera  ENT:No nasal discharge noted. Oropharynx is moist and non-erythematous  Neck: Supple.  No JVD noted.Trachea midline  Cardiovascular: S1 + S2.  Regular rate  Lungs/Respiratory: Good air movement.  No wheezing or rhonchi appreciated.  Abdomen/GI: Soft.  Bowel sounds were positive.  No distension noted.  MSK/Extremities: No significant edema noted. No cyanosis noted. No clubbing noted  Neurologic: Awake, alert and oriented x 3.Able to follow simple commands.  Psychiatric: Normal mood and affect. Does not appear anxious on exam  Skin: No rash noted on " visible skin. No excessive bruising noted.      Labs: Reviewed.     Results from last 7 days   Lab Units 07/06/23  0559 07/04/23 2020   WBC 10*3/mm3 7.35 9.19   HEMOGLOBIN g/dL 9.5* 11.2*   HEMATOCRIT % 29.9* 33.5*   PLATELETS 10*3/mm3 496* 547*   NEUTROPHIL % %  --  79.3*   NEUTROS ABS 10*3/mm3  --  7.28*   EOSINOPHIL % %  --  2.0   EOS ABS 10*3/mm3  --  0.18   LYMPHOCYTE % %  --  12.8*   LYMPHS ABS 10*3/mm3  --  1.18       Results from last 7 days   Lab Units 07/06/23 0559 07/04/23 2020   SODIUM mmol/L 141 135*   POTASSIUM mmol/L 4.1 2.8*   CHLORIDE mmol/L 109* 98   CO2 mmol/L 22.2 21.5*   BUN mg/dL 10 8   CREATININE mg/dL 0.43* 0.48*   CALCIUM mg/dL 8.3* 9.1   BILIRUBIN mg/dL 0.3 0.6   ALK PHOS U/L 73 95   ALT (SGPT) U/L 9 12   AST (SGOT) U/L 18 35*   GLUCOSE mg/dL 94 98   TOTAL PROTEIN g/dL 5.6* 6.6   ALBUMIN g/dL 2.6* 3.0*       Results from last 7 days   Lab Units 07/04/23 2020   MAGNESIUM mg/dL 1.6             Lab Results   Component Value Date    PROCALCITO 0.12 07/04/2023    PROCALCITO 1.19 (H) 04/27/2023       Lab Results   Component Value Date    PROBNP 992.4 (H) 07/04/2023       Lab Results   Component Value Date    CRP 29.82 (H) 04/27/2023       Lab Results   Component Value Date    SEDRATE 34 (H) 04/27/2023         Micro: As of July 6, 2023   No results found for: RESPCX  Lab Results   Component Value Date    BLOODCX No growth at 24 hours 07/05/2023    BLOODCX No growth at 24 hours 07/05/2023    BLOODCX No growth at 5 days 04/27/2023     No results found for: URINECX  No results found for: MRSACX  Lab Results   Component Value Date    MRSAPCR No MRSA Detected 04/28/2023     No results found for: URCX  No components found for: LOWRESPCF  No results found for: THROATCX  No results found for: CULTURES  No components found for: STREPBCX  No results found for: STREPPNEUAG  No results found for: LEGIONELLA  No results found for: MYCOPLASCX  No results found for: GCCX  Lab Results   Component Value Date     WOUNDCX Light growth (2+) Staphylococcus aureus (A) 04/25/2023     No results found for: BODYFLDCX    No results found for: FLU    Lab Results   Component Value Date    ADENOVIRUS Not Detected 07/04/2023     Lab Results   Component Value Date    EZ069R Not Detected 07/04/2023     Lab Results   Component Value Date    CVHKU1 Not Detected 07/04/2023     Lab Results   Component Value Date    CVNL63 Not Detected 07/04/2023     Lab Results   Component Value Date    CVOC43 Not Detected 07/04/2023     Lab Results   Component Value Date    HUMETPNEVS Not Detected 07/04/2023     Lab Results   Component Value Date    HURVEV Not Detected 07/04/2023     Lab Results   Component Value Date    FLUBPCR Not Detected 07/04/2023     Lab Results   Component Value Date    PARAINFLUE Not Detected 07/04/2023     Lab Results   Component Value Date    PARAFLUV2 Not Detected 07/04/2023     Lab Results   Component Value Date    PARAFLUV3 Not Detected 07/04/2023     Lab Results   Component Value Date    PARAFLUV4 Not Detected 07/04/2023     Lab Results   Component Value Date    BPERTPCR Not Detected 07/04/2023     No results found for: [id number]  Lab Results   Component Value Date    CPNEUPCR Not Detected 07/04/2023     Lab Results   Component Value Date    MPNEUMO Not Detected 07/04/2023     Lab Results   Component Value Date    FLUAPCR Not Detected 07/04/2023     No results found for: FLUAH3  No results found for: FLUAH1  Lab Results   Component Value Date    RSV Not Detected 07/04/2023     Lab Results   Component Value Date    BPARAPCR Not Detected 07/04/2023       COVID 19:  Lab Results   Component Value Date    COVID19 Not Detected 07/04/2023       No results found for: THCURSCR  No results found for: PCPUR  No results found for: COCAINEUR  No results found for: METAMPSCNUR  No results found for: LABOPIASCN  No results found for: AMPHETSCREEN  No results found for: LABBENZSCN  No results found for: TRICYCLICSCN  No results found for:  LABMETHSCN  No results found for: BARBITSCNUR  No results found for: OXYCODONESCN  No results found for: PROPOXSCN  No results found for: BUPRENORSCNU  No results found for: ETHANOLMGDL  No results found for: ETOHPCT            ABG:   No results found for: PHART, ARO0RUF, PO2ART, HGBBG, D3XMXKLP, CFIO2, FCOHB, CARBOXYHGB, FMETHB    Echo:            Assessment:  1.  Hypoxic respiratory failure  Suspect related to infectious etiology as she has improved with antibiotics.  Would recommend completing 7-day course of antibiotics given increase in size of lymph nodes compared to previous CT scan.  Continue to wean oxygen as tolerated.  If unable to be weaned off oxygen may need to consider 6-minute walk prior to hospital discharge to determine need for supplemental oxygen at home.      2.  Abnormal CT scan of chest  Many of these changes have actually been present on previous CT scan done about 3 weeks ago.  However, there has been increase in size of lymph nodes and we will go ahead and treat empirically for pneumonia despite normal white count and normal procalcitonin level.  There is some suggestion of fibrotic changes as well as emphysema changes.  Once she is over current illness will need outpatient PFTs to determine if she truly has restrictive versus obstructive lung disease.  Patient appears to be very active at baseline and denies any significant shortness of breath when she is in her normal state of health.      Recommend follow-up in pulmonary clinic in follow-up CT scan in 6 to 8 weeks to ensure resolution of infiltrates and lymphadenopathy    3.  Enlarged  pulmonary arteries on CT scan  Could consider echo to rule out underlying pulmonary hypertension if she has significant COPD on PFT's    The plan was discussed with the  patient.      I would like to thank you for the opportunity to participate in the care of this patient.  We will communicate changes and recommendations, if and when necessary.      This  document was electronically signed by Allyson Blue MD on 07/06/23 at 10:42 EDT     Dictated utilizing Dragon dictation.          Electronically signed by Allyson Blue MD at 07/06/23 5835

## 2023-07-08 NOTE — CASE MANAGEMENT/SOCIAL WORK
Case Management Discharge Note                Selected Continued Care - Admitted Since 7/4/2023       Destination    No services have been selected for the patient.                Durable Medical Equipment Coordination complete.      Service Provider Selected Services Address Phone Fax Patient Preferred    [facility]  O[facility] Equipment and Accessories [address] [place] [id number] [phone] --       Internal Comment last updated by Nahomi Alcaraz 7/8/2023 1504    Oxygen and walker                          Dialysis/Infusion    No services have been selected for the patient.                Home Medical Care    No services have been selected for the patient.                Therapy    No services have been selected for the patient.                Community Resources    No services have been selected for the patient.                Community & DME    No services have been selected for the patient.                    Transportation Services  Private: Car    Final Discharge Disposition Code: 01 - home or self-care

## 2023-07-08 NOTE — DISCHARGE SUMMARY
[facility]   INTERNAL MEDICINE DISCHARGE SUMMARY      Name:  Leann Antonio   Age:  67 y.o.  Sex:  female  :  1955  MRN:  [id number]   Visit Number:  [id number]  Primary Care Physician:  Ulises Coombs MD  Date of Discharge:  2023  Admission Date:  2023      Discharge Diagnosis:         Acute respiratory failure with hypoxia    Depression    Hyperlipidemia    Hypothyroidism (acquired)    Bilateral pneumonia          Consults:     Consults     Date and Time Order Name Status Description    2023  7:07 AM Inpatient Pulmonology Consult Completed           Procedures Performed:                 Hospital Course:   The patient was admitted on 2023  Please see H&P for details on admission HPI and ROS.    67-year-old patient with past medical history of hypothyroidism, hyperlipidemia, depression, comes into the ER because of shortness of breath and chronic cough which has been ongoing for the last few weeks.  She was treated as outpatient with antibiotics and has not improved.  In the ER work-up done showed that she was hypoxic on room air saturation dropped to 85 at the same time CT scan scan was done and it showed and confirmed bilateral pneumonia along with underlying COPD and possible fibrotic changes which is new.  Patient denies having major fever and work-up done showed lactic acid is normal with procalcitonin being 0.12  Labs and the CT scan results have been reviewed as below  In the ER she was started on IV Rocephin and Zithromax and bronchodilators with steroids and is being further admitted for management of hypoxic respiratory failure with bilateral pneumonia    After admission she has been given IV antibiotics Rocephin and Zithromax and IV steroids.  Patient has gradually improved she feels better her oxygen level has improved and on room air and she is around 91%.     CT scan of the chest done showed that there is a possible fibrosis with mild COPD due to she will  be needing PFTs and further evaluation after the pneumonia has resolved with the pulmonologist and appointment to be scheduled accordingly     she will have a 6-minute walk on ambulation to see if she does desaturate and if oxygen is needed.  Will be provided with ambulation and most likely she should be stable then she will be going home today without any oxygen.  She will take doxycycline for 5 more days along with inhaler and lisinopril has been added for her blood pressure.  She will be discharged with medication as below and a follow-up will be done on Thursday afternoon at 2 PM  Vital Signs:     Temp:  [97.8 °F (36.6 °C)-98.3 °F (36.8 °C)] 97.9 °F (36.6 °C)  Heart Rate:  [72-75] 75  Resp:  [18-22] 18  BP: (155-170)/(76-83) 164/83    Physical Exam:     General Appearance:    Alert and cooperative, not in any acute distress.   Head:    Atraumatic and normocephalic, without obvious abnormality.   Eyes:            PERRLA,  No pallor. Extraocular movements are within normal limits.   Neck:   Supple,  No lymph glands, no bruit   Lungs:     Chest shape is normal. Breath sounds heard bilaterally equally.  No crackles or wheezing.     Heart:    Normal S1 and S2, no murmur,  No JVD   Abdomen:     Normal bowel sounds, no masses, no organomegaly. Soft     nontender, no guarding, no rebound tenderness   Extremities:   Moves all extremities well, no edema, no cyanosis,    Skin:   No  bruising or rash.   Neurologic:   Grossly nonfocal and moves all extremities.        Pertinent Lab Results:     Results from last 7 days   Lab Units 07/06/23 0559 07/04/23 2020   SODIUM mmol/L 141 135*   POTASSIUM mmol/L 4.1 2.8*   CHLORIDE mmol/L 109* 98   CO2 mmol/L 22.2 21.5*   BUN mg/dL 10 8   CREATININE mg/dL 0.43* 0.48*   CALCIUM mg/dL 8.3* 9.1   BILIRUBIN mg/dL 0.3 0.6   ALK PHOS U/L 73 95   ALT (SGPT) U/L 9 12   AST (SGOT) U/L 18 35*   GLUCOSE mg/dL 94 98     Results from last 7 days   Lab Units 07/06/23 0559 07/04/23 2020   WBC  10*3/mm3 7.35 9.19   HEMOGLOBIN g/dL 9.5* 11.2*   HEMATOCRIT % 29.9* 33.5*   PLATELETS 10*3/mm3 496* 547*         Blood Culture   Date Value Ref Range Status   07/05/2023 No growth at 3 days  Preliminary   07/05/2023 No growth at 3 days  Preliminary       Pertinent Radiology Results:    Imaging Results (Most Recent)     Procedure Component Value Units Date/Time    XR Chest 1 View [[id number]] Collected: 07/05/23 0742     Updated: 07/05/23 0746    Narrative:      PROCEDURE: XR CHEST 1 VW-     INDICATION:  Chest Pain Triage Protocol     FINDINGS:  A portable view of the chest was obtained.  Comparison is  made to a prior exam dated 06/12/2023.   Heart size is normal. There are  new bilateral upper lobe opacities, left greater than right concerning  for pneumonia. A component of cavitation is not excluded on the left.  There are small bilateral pleural effusions. No pneumothorax.       Impression:      New bilateral upper lobe opacities concerning for  pneumonia. Cavitation is not excluded on the left. Recommend  radiographic follow-up to resolution.     This report was signed and finalized on 7/5/2023 7:44 AM by Christa Cruz MD.    CT Angiogram Chest Pulmonary Embolism [[id number]] Collected: 07/05/23 0143     Updated: 07/05/23 0145    Narrative:      FINAL REPORT    TECHNIQUE:  null    CLINICAL HISTORY:  Hypoxia at rest, abnormal CXR    COMPARISON:  null    FINDINGS:  CTA chest    Comparison: 06/12/2023.    Findings:    Full study including information received at 12:40 a.m. CDT 07/05/2023.    2.4 cm right and 2.2 cm left main pulmonary artery mild dilatation unchanged may be related to pulmonary arterial hypertension. No pulmonary embolism seen. Thoracic aortic atherosclerosis with no aneurysm or dissection. Coronary artery atherosclerosis.   Old calcific granulomatous disease. Mild-to-moderate mediastinal and mild left-greater-than-right hilar lymphadenopathy increased. Small bilateral pleural effusions new  compared to prior study. Centrilobular emphysema. Increased cystic changes at apices.   Other subpleural small cystic changes including mid lower lungs as previous. This probably represent superimposed fibrosis. Ill-defined consolidation and groundglass attenuation bilaterally particularly upper lobes not seen on prior study. Overall   findings suggest atypical pneumonia. Slight bilateral basilar dependent densities probably atelectasis. Clinical correlation and follow-up to resolution recommended. No pneumothorax. Mild apparent thickening of esophageal wall particularly upper mid may   be mildly increased however collapsed nonspecific could be artifactual but can not exclude mild esophagitis. Cholecystectomy. Heterogeneity of spleen probably arterial phase related artifact. If clinically indicated, multiphasic CT abdomen follow-up   recommended. Slight thickening left adrenal gland unchanged.      Impression:      Impression:    1. New bilateral ill-defined consolidation and groundglass attenuation deleted particularly upper lobes, small pleural effusions, and lymphadenopathy probably related to pneumonia. Clinical correlation and follow-up to resolution recommended.    2. Underlying centrilobular emphysema and fibrosis.    3. No PE seen.    Authenticated and Electronically Signed by Madhu Kramer MD on  07/05/2023 01:43:20 AM                  Discharge Disposition:      Home or Self Care    Discharge Medication:         Discharge Medications      New Medications      Instructions Start Date   albuterol sulfate  (90 Base) MCG/ACT inhaler  Commonly known as: PROVENTIL HFA;VENTOLIN HFA;PROAIR HFA   2 puffs, Inhalation, Every 6 Hours PRN      doxycycline 100 MG capsule  Commonly known as: MONODOX   100 mg, Oral, Every 12 Hours Scheduled      lisinopril 20 MG tablet  Commonly known as: PRINIVIL,ZESTRIL   20 mg, Oral, Every 24 Hours Scheduled   Start Date: July 9, 2023        Continue These Medications       Instructions Start Date   busPIRone 15 MG tablet  Commonly known as: BUSPAR   Take 2 tablets by mouth 2 (Two) Times a Day.      cetirizine 10 MG tablet  Commonly known as: zyrTEC   10 mg, Oral, Daily      DULoxetine 30 MG capsule  Commonly known as: CYMBALTA   30 mg, Oral, Daily      fenofibrate 160 MG tablet   TAKE 1 TABLET BY MOUTH DAILY      ferrous sulfate 325 (65 FE) MG tablet   TAKE 1 TABLET BY MOUTH TWICE DAILY      gabapentin 400 MG capsule  Commonly known as: NEURONTIN   400 mg, Oral, Nightly      ibuprofen 600 MG tablet  Commonly known as: ADVIL,MOTRIN   600 mg, Oral, Every 8 Hours PRN      levothyroxine 75 MCG tablet  Commonly known as: SYNTHROID, LEVOTHROID   75 mcg, Oral, Daily      pantoprazole 40 MG EC tablet  Commonly known as: PROTONIX   Take 1 tablet in the am 30 minutes before breakfast.      QUEtiapine 50 MG tablet  Commonly known as: SEROquel   50 mg, Oral, Nightly      rosuvastatin 20 MG tablet  Commonly known as: CRESTOR   TAKE 1 TABLET BY MOUTH DAILY      Senna Plus 8.6-50 MG per capsule  Generic drug: Sennosides-Docusate Sodium   1 capsule, Oral, Daily      simethicone 80 MG chewable tablet  Commonly known as: MYLICON   80 mg, Oral, Every 6 Hours PRN      tiZANidine 4 MG tablet  Commonly known as: ZANAFLEX   8 mg, Oral, Nightly PRN      traZODone 50 MG tablet  Commonly known as: DESYREL   100 mg, Oral, Nightly      zolpidem 10 MG tablet  Commonly known as: AMBIEN   10 mg, Oral, Nightly PRN             Discharge Diet:       Regular      Follow-up Appointments:      Follow-up with me will be done this coming Thursday at 2 PM in office    No future appointments.  Additional Instructions for the Follow-ups that You Need to Schedule     Ambulatory Referral to Disease State Management   As directed      Will need repeat CT scan in 6 to 8 weeks  Needs PFTs after clinically improved and back to her baseline    Order Comments: Will need repeat CT scan in 6 to 8 weeks Needs PFTs after clinically  improved and back to her baseline     To dept: [facility] [[id number]]    What program(s) are you referring for?: COPD    Follow-up needed: Yes               Test Results Pending at Discharge:      Pending Labs     Order Current Status    Blood Culture With TYRON - Blood, Arm, Right Preliminary result    Blood Culture With TYRON - Blood, Hand, Right Preliminary result             Ulises Coombs MD  07/08/23  11:08 EDT    Time:  Discharge 33 min    Please note that portions of this note were completed with a voice recognition program.

## 2023-07-10 LAB
BACTERIA SPEC AEROBE CULT: NORMAL
BACTERIA SPEC AEROBE CULT: NORMAL

## 2023-07-25 NOTE — PROGRESS NOTES
Enter Query Response Below      Query Response:   Unable to determine            If applicable, please update the problem list.     Patient: Leann Antonio        : 1955  Account: [id number]           Admit Date: 2023        How to Respond to this query:       a. Click New Note     b. Answer query within the yellow box.                c. Update the Problem List, if applicable.      If you have any questions about this query contact me at: [phone]     Dr. Coombs:     Patient presents to emergency room with complaints of cough and shortness of breath.  On arrival room air sat 95%, respirations 16.  Emergency room provider note states pulmonary effort normal, patient arrives hemodynamically stable, SPO2 of 94% on room air, she had a decrease in her SPO2 to 85% while sitting on stretcher, she quickly improved with supplemental oxygen.  History and physical states acute hypoxic respiratory failure.  Nursing note states maintaining O2 >90% on 1.5lnc. Discharge summary states acute respiratory failure with hypoxia, her oxygen level has improved and on room air is around 91%.     After study was acute hypoxic respiratory failure clinically supported during this admission?    Yes, please include additional clinical indicators_____________________________________.  No  Other________________________.  Unable to determine.     By submitting this query, we are merely seeking further clarification of documentation to accurately reflect all conditions that you are monitoring, evaluating, treating or that extend the hospitalization or utilize additional resources of care. Please utilize your independent clinical judgment when addressing the question(s) above.     This query and your response, once completed, will be entered into the legal medical record.    Sincerely,  Lynn CLAYTON RN BSN   Clinical Documentation Integrity Program   [email or web address]

## 2023-08-01 ENCOUNTER — DISEASE STATE MANAGEMENT VISIT (OUTPATIENT)
Dept: PHARMACY | Facility: HOSPITAL | Age: 68
End: 2023-08-01
Payer: MEDICARE

## 2023-08-01 VITALS
TEMPERATURE: 96.9 F | WEIGHT: 143 LBS | BODY MASS INDEX: 25.34 KG/M2 | HEART RATE: 79 BPM | SYSTOLIC BLOOD PRESSURE: 159 MMHG | OXYGEN SATURATION: 98 % | HEIGHT: 63 IN | DIASTOLIC BLOOD PRESSURE: 74 MMHG

## 2023-08-01 DIAGNOSIS — Z87.891 PERSONAL HISTORY OF NICOTINE DEPENDENCE: ICD-10-CM

## 2023-08-01 DIAGNOSIS — J43.9 PULMONARY EMPHYSEMA, UNSPECIFIED EMPHYSEMA TYPE: Primary | ICD-10-CM

## 2023-08-01 DIAGNOSIS — R09.02 EXERCISE HYPOXEMIA: ICD-10-CM

## 2023-08-01 DIAGNOSIS — R93.89 ABNORMAL CHEST CT: ICD-10-CM

## 2023-08-01 PROCEDURE — 94010 BREATHING CAPACITY TEST: CPT | Performed by: NURSE PRACTITIONER

## 2023-08-01 PROCEDURE — 94618 PULMONARY STRESS TESTING: CPT | Performed by: NURSE PRACTITIONER

## 2023-08-01 RX ORDER — PRAVASTATIN SODIUM 40 MG
1 TABLET ORAL DAILY
COMMUNITY
Start: 2023-06-06

## 2023-08-01 RX ORDER — DOXYCYCLINE 100 MG/1
TABLET ORAL
COMMUNITY
Start: 2023-07-31

## 2023-08-21 ENCOUNTER — HOSPITAL ENCOUNTER (OUTPATIENT)
Dept: CT IMAGING | Facility: HOSPITAL | Age: 68
Discharge: HOME | End: 2023-08-21
Admitting: NURSE PRACTITIONER
Payer: MEDICARE

## 2023-08-21 DIAGNOSIS — R93.89 ABNORMAL CHEST CT: ICD-10-CM

## 2023-08-21 DIAGNOSIS — R09.02 EXERCISE HYPOXEMIA: ICD-10-CM

## 2023-08-21 PROCEDURE — 71250 CT THORAX DX C-: CPT

## 2023-08-23 ENCOUNTER — TELEPHONE (OUTPATIENT)
Dept: PULMONOLOGY | Facility: CLINIC | Age: 68
End: 2023-08-23

## 2023-08-23 NOTE — TELEPHONE ENCOUNTER
Hub staff attempted to follow warm transfer process and was unsuccessful     Caller: DAVID SHAW    Relationship to patient: SELF     Best call back number: [phone]    Patient is needing: A CALL BACK ABOUT HER CT SCAN TEST RESULTS

## 2023-09-29 ENCOUNTER — TRANSCRIBE ORDERS (OUTPATIENT)
Dept: ADMINISTRATIVE | Facility: HOSPITAL | Age: 68
End: 2023-09-29
Payer: MEDICARE

## 2023-09-29 DIAGNOSIS — Z12.31 VISIT FOR SCREENING MAMMOGRAM: Primary | ICD-10-CM

## 2023-10-06 ENCOUNTER — HOSPITAL ENCOUNTER (OUTPATIENT)
Dept: MAMMOGRAPHY | Facility: HOSPITAL | Age: 68
Discharge: HOME | End: 2023-10-06
Admitting: INTERNAL MEDICINE
Payer: MEDICARE

## 2023-10-06 DIAGNOSIS — Z12.31 VISIT FOR SCREENING MAMMOGRAM: ICD-10-CM

## 2023-10-06 PROCEDURE — 77067 SCR MAMMO BI INCL CAD: CPT

## 2023-10-06 PROCEDURE — 77063 BREAST TOMOSYNTHESIS BI: CPT

## 2023-10-27 ENCOUNTER — OFFICE VISIT (OUTPATIENT)
Dept: PULMONOLOGY | Facility: CLINIC | Age: 68
End: 2023-10-27
Payer: MEDICARE

## 2023-10-27 VITALS
BODY MASS INDEX: 24.45 KG/M2 | HEIGHT: 63 IN | DIASTOLIC BLOOD PRESSURE: 64 MMHG | WEIGHT: 138 LBS | OXYGEN SATURATION: 97 % | HEART RATE: 68 BPM | SYSTOLIC BLOOD PRESSURE: 128 MMHG

## 2023-10-27 DIAGNOSIS — J30.9 ALLERGIC RHINITIS, UNSPECIFIED SEASONALITY, UNSPECIFIED TRIGGER: ICD-10-CM

## 2023-10-27 DIAGNOSIS — J43.9 PULMONARY EMPHYSEMA, UNSPECIFIED EMPHYSEMA TYPE: Primary | ICD-10-CM

## 2023-10-27 DIAGNOSIS — J43.9 PULMONARY EMPHYSEMA, UNSPECIFIED EMPHYSEMA TYPE: ICD-10-CM

## 2023-10-27 DIAGNOSIS — R93.89 ABNORMAL CHEST CT: ICD-10-CM

## 2023-10-27 DIAGNOSIS — Z87.891 PERSONAL HISTORY OF NICOTINE DEPENDENCE: ICD-10-CM

## 2023-10-27 RX ORDER — TRAZODONE HYDROCHLORIDE 100 MG/1
100 TABLET ORAL
COMMUNITY
Start: 2023-10-12

## 2023-10-27 RX ORDER — FLUTICASONE PROPIONATE 50 MCG
1 SPRAY, SUSPENSION (ML) NASAL DAILY
Qty: 11.1 ML | Refills: 5 | Status: SHIPPED | OUTPATIENT
Start: 2023-10-27

## 2023-10-27 RX ORDER — ALBUTEROL SULFATE 90 UG/1
2 AEROSOL, METERED RESPIRATORY (INHALATION) EVERY 4 HOURS PRN
Qty: 8 G | Refills: 2 | Status: SHIPPED | OUTPATIENT
Start: 2023-10-27

## 2023-10-27 RX ORDER — LEVOTHYROXINE SODIUM 0.05 MG/1
1 TABLET ORAL DAILY
COMMUNITY
Start: 2023-08-30

## 2023-10-27 RX ORDER — LISINOPRIL 10 MG/1
TABLET ORAL
COMMUNITY
Start: 2023-10-25

## 2023-10-27 NOTE — PROGRESS NOTES
Follow Up Office Visit      Patient Name: Leann Antonio    Chief Complaint:    Chief Complaint   Patient presents with    Breathing Problem    Follow-up       History of Present Illness: Leann Antonio is a 68 y.o. female who is here today for follow up of emphysema.  Since last visit, she notes that she has been feeling well.  She has been volunteering and doing a lot of walking without difficulty.  Performs ADLs well and denies any significant shortness of breath.  Is able to walk a couple of blocks without a problem.  She notes an occasional cough which she relates to allergic rhinitis.  Takes over-the-counter Zyrtec.  She has been using Anoro and has not been requiring albuterol use.    Supplemental Oxygen: No    Subjective      Review of Systems:  Review of Systems   Constitutional:  Negative for fever and unexpected weight change.   Respiratory:  Positive for cough and shortness of breath (Mild, intermittent). Negative for wheezing.    Cardiovascular:  Negative for chest pain and leg swelling.   Allergic/Immunologic: Positive for environmental allergies.        Past Medical History:   Past Medical History:   Diagnosis Date    Depression     GERD (gastroesophageal reflux disease)     Hyperlipidemia     Panic attacks     Pleurisy        Past Surgical History:   Past Surgical History:   Procedure Laterality Date    CHOLECYSTECTOMY      HYSTERECTOMY         Family History:   Family History   Problem Relation Age of Onset    Cirrhosis Mother     Cirrhosis Brother     Breast cancer Sister     Colon cancer Neg Hx        Social History:   Social History     Socioeconomic History    Marital status:    Tobacco Use    Smoking status: Former     Packs/day: 0.50     Years: 30.00     Additional pack years: 0.00     Total pack years: 15.00     Types: Cigarettes     Quit date: 2023     Years since quittin.5     Passive exposure: Past    Smokeless tobacco: Never   Vaping Use    Vaping Use: Former     Quit date: 5/1/2023    Substances: Nicotine   Substance and Sexual Activity    Alcohol use: No    Drug use: No    Sexual activity: Defer       Current Medications:     Current Outpatient Medications:     albuterol sulfate  (90 Base) MCG/ACT inhaler, Inhale 2 puffs Every 4 (Four) Hours As Needed for Wheezing or Shortness of Air (Cough)., Disp: 8 g, Rfl: 2    busPIRone (BUSPAR) 15 MG tablet, Take 2 tablets by mouth 2 (Two) Times a Day., Disp: , Rfl: 0    cetirizine (zyrTEC) 10 MG tablet, Take 1 tablet by mouth Daily., Disp: , Rfl:     DULoxetine (CYMBALTA) 30 MG capsule, Take 1 capsule by mouth Daily., Disp: , Rfl:     fenofibrate 160 MG tablet, TAKE 1 TABLET BY MOUTH DAILY, Disp: , Rfl: 2    gabapentin (NEURONTIN) 400 MG capsule, Take 1 capsule by mouth Every Night., Disp: , Rfl:     levothyroxine (SYNTHROID, LEVOTHROID) 50 MCG tablet, Take 1 tablet by mouth Daily., Disp: , Rfl:     levothyroxine (SYNTHROID, LEVOTHROID) 75 MCG tablet, Take 1 tablet by mouth Daily., Disp: , Rfl:     lisinopril (PRINIVIL,ZESTRIL) 10 MG tablet, , Disp: , Rfl:     pantoprazole (PROTONIX) 40 MG EC tablet, Take 1 tablet in the am 30 minutes before breakfast., Disp: 30 tablet, Rfl: 5    pravastatin (PRAVACHOL) 40 MG tablet, Take 1 tablet by mouth Daily., Disp: , Rfl:     QUEtiapine (SEROquel) 50 MG tablet, Take 1 tablet by mouth Every Night., Disp: , Rfl:     rosuvastatin (CRESTOR) 20 MG tablet, TAKE 1 TABLET BY MOUTH DAILY, Disp: , Rfl: 2    Sennosides-Docusate Sodium (Senna Plus) 8.6-50 MG per capsule, Take 1 capsule by mouth Daily., Disp: , Rfl:     simethicone (MYLICON) 80 MG chewable tablet, Chew 1 tablet Every 6 (Six) Hours As Needed for Flatulence., Disp: , Rfl:     traZODone (DESYREL) 100 MG tablet, Take 1 tablet by mouth every night at bedtime., Disp: , Rfl:     Umeclidinium-Vilanterol (ANORO ELLIPTA) 62.5-25 MCG/ACT aerosol powder  inhaler, 1 inhalation daily, Disp: 1 each, Rfl: 5    zolpidem (AMBIEN) 10 MG tablet, Take 1  "tablet by mouth At Night As Needed for Sleep., Disp: , Rfl:     ibuprofen (ADVIL,MOTRIN) 600 MG tablet, Take 1 tablet by mouth Every 8 (Eight) Hours As Needed for Mild Pain. (Patient not taking: Reported on 10/27/2023), Disp: , Rfl:     tiZANidine (ZANAFLEX) 4 MG tablet, Take 2 tablets by mouth At Night As Needed for Muscle Spasms. (Patient not taking: Reported on 10/27/2023), Disp: , Rfl:      Allergies:   Allergies   Allergen Reactions    Penicillins Hives       Objective     Physical Exam:  Vital Signs:   Vitals:    10/27/23 0859   BP: 128/64   Pulse: 68   SpO2: 97%   Weight: 62.6 kg (138 lb)   Height: 158.8 cm (62.5\")     Body mass index is 24.84 kg/m².    Physical Exam  Vitals reviewed.   Constitutional:       General: She is not in acute distress.     Appearance: She is not toxic-appearing.   HENT:      Head: Normocephalic and atraumatic.      Mouth/Throat:      Mouth: Mucous membranes are moist.   Eyes:      Conjunctiva/sclera: Conjunctivae normal.   Cardiovascular:      Rate and Rhythm: Normal rate.      Heart sounds: Normal heart sounds.   Pulmonary:      Effort: Pulmonary effort is normal.      Breath sounds: Normal breath sounds.   Abdominal:      General: There is no distension.      Palpations: Abdomen is soft.   Musculoskeletal:         General: No swelling.      Cervical back: Neck supple.   Skin:     General: Skin is warm and dry.      Findings: No rash.   Neurological:      General: No focal deficit present.      Mental Status: She is alert and oriented to person, place, and time.   Psychiatric:         Mood and Affect: Mood normal.         Behavior: Behavior normal.       Results Review:   August 2023 chest CT scan showed persistent cavitary lesion in the left upper lobe, although significantly improved.  Improved multifocal pneumonia with resolved pleural effusions.    PFT obtained today showed no obstruction, no significant bronchodilator response.  FEV1 83%.  Mild restriction with TLC of 65%, no " air trapping.  Normal diffusion capacity.    Assessment / Plan      Assessment/Plan:   Diagnoses and all orders for this visit:    1. Pulmonary emphysema, unspecified emphysema type (Primary)  -     albuterol sulfate  (90 Base) MCG/ACT inhaler; Inhale 2 puffs Every 4 (Four) Hours As Needed for Wheezing or Shortness of Air (Cough).  Dispense: 8 g; Refill: 2  -     Umeclidinium-Vilanterol (ANORO ELLIPTA) 62.5-25 MCG/ACT aerosol powder  inhaler; 1 inhalation daily  Dispense: 1 each; Refill: 5  Noted on prior chest imaging.  Alpha-1 testing results not currently scanned into the chart, will need to be looked up in the portal.  Continue current inhaled regimen.    2. Abnormal chest CT  -     CT Chest Without Contrast Diagnostic; Future  Significant improvement compared to prior.  Schedule repeat chest CT scan to continue monitoring left upper lobe cavitary lesion.    3. Allergic rhinitis, unspecified seasonality, unspecified trigger  -     fluticasone (FLONASE) 50 MCG/ACT nasal spray; 1 spray into the nostril(s) as directed by provider Daily.  Dispense: 11.1 mL; Refill: 5  Continue cetirizine.  Add Flonase to current regimen.    4. Hx of smoking  Ongoing cessation advised.       Follow Up:   Return in 6 months (on 4/27/2024) for Recheck.  The patient was counseled on diagnostic results, risks and benefits of treatment options, risk factor modifications and the importance of treatment compliance. The patient was advised to contact the clinic with concerns or worsening symptoms.     SORIN Marinelli   Pulmonary Medicine [facility]

## 2023-10-31 DIAGNOSIS — J43.9 PULMONARY EMPHYSEMA, UNSPECIFIED EMPHYSEMA TYPE: ICD-10-CM

## 2023-10-31 NOTE — PROGRESS NOTES
Please give the patient the following message:  Your tests have been reviewed. No changes are needed to our current plan. Can let her know that she is an alpha 1 carrier and children or siblings may want to be tested though will discuss this in detail at her next visit.

## 2023-12-26 ENCOUNTER — HOSPITAL ENCOUNTER (OUTPATIENT)
Dept: CT IMAGING | Facility: HOSPITAL | Age: 68
Discharge: HOME | End: 2023-12-26
Admitting: NURSE PRACTITIONER
Payer: MEDICARE

## 2023-12-26 DIAGNOSIS — R93.89 ABNORMAL CHEST CT: ICD-10-CM

## 2023-12-26 PROCEDURE — 71250 CT THORAX DX C-: CPT

## 2024-01-05 ENCOUNTER — TELEPHONE (OUTPATIENT)
Dept: PULMONOLOGY | Facility: CLINIC | Age: 69
End: 2024-01-05
Payer: MEDICARE

## 2024-01-05 DIAGNOSIS — R93.89 ABNORMAL CHEST CT: Primary | ICD-10-CM

## 2024-01-05 NOTE — TELEPHONE ENCOUNTER
Please let the patient know that Dr. Blue and I have reviewed her recent chest CT scan result which continues to show improvement. Will continue to monitor. I have ordered a repeat CT to be performed in 3 months and will discuss in further detail at next clinic visit.

## 2024-01-06 DIAGNOSIS — J43.9 PULMONARY EMPHYSEMA, UNSPECIFIED EMPHYSEMA TYPE: ICD-10-CM

## 2024-01-08 RX ORDER — ALBUTEROL SULFATE 90 UG/1
2 AEROSOL, METERED RESPIRATORY (INHALATION) EVERY 4 HOURS PRN
Qty: 18 G | Refills: 2 | Status: SHIPPED | OUTPATIENT
Start: 2024-01-08

## 2024-03-26 ENCOUNTER — HOSPITAL ENCOUNTER (OUTPATIENT)
Dept: CT IMAGING | Facility: HOSPITAL | Age: 69
Discharge: HOME | End: 2024-03-26
Admitting: NURSE PRACTITIONER
Payer: MEDICARE

## 2024-03-26 DIAGNOSIS — R93.89 ABNORMAL CHEST CT: ICD-10-CM

## 2024-03-26 PROCEDURE — 71250 CT THORAX DX C-: CPT

## 2024-04-04 ENCOUNTER — TELEPHONE (OUTPATIENT)
Dept: PULMONOLOGY | Facility: CLINIC | Age: 69
End: 2024-04-04
Payer: MEDICARE

## 2024-04-04 NOTE — TELEPHONE ENCOUNTER
Informed patient, per SORIN Su that her recent chest CT scan did show improvement in the right upper lung abnormality compared to prior.  Otherwise, some stable scarring noted.  Recommend continuing to monitor.  Keep upcoming clinic visit and will discuss in full detail and determine plan of care at that time. Patient states understanding of information and was reminded of date and time of next visit.

## 2024-04-04 NOTE — TELEPHONE ENCOUNTER
----- Message from SORIN Marinelli sent at 3/27/2024 12:13 PM EDT -----  Please let the patient know that her recent chest CT scan did show improvement in the right upper lung abnormality compared to prior.  Otherwise, some stable scarring noted.  Recommend continuing to monitor.  Keep upcoming clinic visit and will discuss in full detail and determine plan of care at that time.

## 2024-07-17 DIAGNOSIS — J30.9 ALLERGIC RHINITIS, UNSPECIFIED SEASONALITY, UNSPECIFIED TRIGGER: ICD-10-CM

## 2024-07-17 RX ORDER — FLUTICASONE PROPIONATE 50 MCG
SPRAY, SUSPENSION (ML) NASAL
Qty: 48 ML | Refills: 1 | Status: SHIPPED | OUTPATIENT
Start: 2024-07-17

## 2024-08-26 ENCOUNTER — OFFICE VISIT (OUTPATIENT)
Dept: PULMONOLOGY | Facility: CLINIC | Age: 69
End: 2024-08-26
Payer: MEDICARE

## 2024-08-26 VITALS
SYSTOLIC BLOOD PRESSURE: 132 MMHG | WEIGHT: 145 LBS | BODY MASS INDEX: 25.69 KG/M2 | HEIGHT: 63 IN | OXYGEN SATURATION: 99 % | DIASTOLIC BLOOD PRESSURE: 74 MMHG | HEART RATE: 65 BPM

## 2024-08-26 DIAGNOSIS — Z14.8 ALPHA-1-ANTITRYPSIN DEFICIENCY CARRIER: ICD-10-CM

## 2024-08-26 DIAGNOSIS — J43.9 PULMONARY EMPHYSEMA, UNSPECIFIED EMPHYSEMA TYPE: ICD-10-CM

## 2024-08-26 DIAGNOSIS — R93.89 ABNORMAL CHEST CT: Primary | ICD-10-CM

## 2024-08-26 DIAGNOSIS — J30.9 ALLERGIC RHINITIS, UNSPECIFIED SEASONALITY, UNSPECIFIED TRIGGER: ICD-10-CM

## 2024-08-26 PROCEDURE — 99214 OFFICE O/P EST MOD 30 MIN: CPT | Performed by: NURSE PRACTITIONER

## 2024-08-26 RX ORDER — ALBUTEROL SULFATE 90 UG/1
2 AEROSOL, METERED RESPIRATORY (INHALATION) EVERY 4 HOURS PRN
Qty: 18 G | Refills: 5 | Status: SHIPPED | OUTPATIENT
Start: 2024-08-26

## 2024-08-26 RX ORDER — FLUTICASONE FUROATE AND VILANTEROL TRIFENATATE 100; 25 UG/1; UG/1
1 POWDER RESPIRATORY (INHALATION) DAILY
COMMUNITY
Start: 2024-07-29

## 2024-08-26 RX ORDER — MONTELUKAST SODIUM 10 MG/1
10 TABLET ORAL NIGHTLY
Qty: 30 TABLET | Refills: 5 | Status: SHIPPED | OUTPATIENT
Start: 2024-08-26

## 2024-08-26 NOTE — PROGRESS NOTES
Follow Up Office Visit      Patient Name: Leann Antonio    Chief Complaint:    Chief Complaint   Patient presents with    Breathing Problem       History of Present Illness: Leann Antonio is a 68 y.o. female who is here today for follow up of emphysema and abnormal chest imaging.  She was last seen in 2023.  Since last visit, she denies having any exacerbations. Dyspnea is controlled on Breo, which she thinks has been working better for her than Anoro did. She reports good functional capacity. Occasional OSVALDO use when she has a cold or during allergy seasons.     Associated Symptoms: No current cough or wheezing, allergic rhinitis  Supplemental Oxygen: No    Subjective      Review of Systems:  Review of Systems   Constitutional:  Negative for fever and unexpected weight change.   Respiratory:  Positive for shortness of breath. Negative for cough and wheezing.    Cardiovascular:  Negative for chest pain and leg swelling.   Allergic/Immunologic: Positive for environmental allergies.        Past Medical History:   Past Medical History:   Diagnosis Date    Depression     GERD (gastroesophageal reflux disease)     Hyperlipidemia     Panic attacks     Pleurisy        Past Surgical History:   Past Surgical History:   Procedure Laterality Date    CHOLECYSTECTOMY      HYSTERECTOMY         Family History:   Family History   Problem Relation Age of Onset    Cirrhosis Mother     Cirrhosis Brother     Breast cancer Sister     Colon cancer Neg Hx        Social History:   Social History     Socioeconomic History    Marital status:    Tobacco Use    Smoking status: Former     Current packs/day: 0.00     Average packs/day: 0.5 packs/day for 30.0 years (15.0 ttl pk-yrs)     Types: Cigarettes     Start date: 1993     Quit date: 2023     Years since quittin.3     Passive exposure: Past    Smokeless tobacco: Never   Vaping Use    Vaping status: Former    Quit date: 2023    Substances: Nicotine    Substance and Sexual Activity    Alcohol use: No    Drug use: No    Sexual activity: Defer       Current Medications:   Current Outpatient Medications:     albuterol sulfate  (90 Base) MCG/ACT inhaler, INHALE 2 PUFFS EVERY 4 (FOUR) HOURS AS NEEDED FOR WHEEZING OR SHORTNESS OF AIR (COUGH)., Disp: 18 g, Rfl: 2    Breo Ellipta 100-25 MCG/ACT aerosol powder , Inhale 1 puff Daily., Disp: , Rfl:     busPIRone (BUSPAR) 15 MG tablet, Take 2 tablets by mouth 2 (Two) Times a Day., Disp: , Rfl: 0    cetirizine (zyrTEC) 10 MG tablet, Take 1 tablet by mouth Daily., Disp: , Rfl:     DULoxetine (CYMBALTA) 30 MG capsule, Take 1 capsule by mouth Daily., Disp: , Rfl:     fenofibrate 160 MG tablet, TAKE 1 TABLET BY MOUTH DAILY, Disp: , Rfl: 2    fluticasone (FLONASE) 50 MCG/ACT nasal spray, SPRAY 1 SPRAY INTO THE NOSTRILS DAILY AS DIRECTED BY PROVIDER, Disp: 48 mL, Rfl: 1    gabapentin (NEURONTIN) 400 MG capsule, Take 1 capsule by mouth Every Night., Disp: , Rfl:     ibuprofen (ADVIL,MOTRIN) 600 MG tablet, Take 1 tablet by mouth Every 8 (Eight) Hours As Needed for Mild Pain., Disp: , Rfl:     levothyroxine (SYNTHROID, LEVOTHROID) 50 MCG tablet, Take 1 tablet by mouth Daily., Disp: , Rfl:     lisinopril (PRINIVIL,ZESTRIL) 10 MG tablet, , Disp: , Rfl:     pantoprazole (PROTONIX) 40 MG EC tablet, Take 1 tablet in the am 30 minutes before breakfast., Disp: 30 tablet, Rfl: 5    pravastatin (PRAVACHOL) 40 MG tablet, Take 1 tablet by mouth Daily., Disp: , Rfl:     QUEtiapine (SEROquel) 50 MG tablet, Take 1 tablet by mouth Every Night., Disp: , Rfl:     rosuvastatin (CRESTOR) 20 MG tablet, TAKE 1 TABLET BY MOUTH DAILY, Disp: , Rfl: 2    Sennosides-Docusate Sodium (Senna Plus) 8.6-50 MG per capsule, Take 1 capsule by mouth Daily., Disp: , Rfl:     simethicone (MYLICON) 80 MG chewable tablet, Chew 1 tablet Every 6 (Six) Hours As Needed for Flatulence., Disp: , Rfl:     tiZANidine (ZANAFLEX) 4 MG tablet, Take 2 tablets by mouth At Night  "As Needed for Muscle Spasms., Disp: , Rfl:     traZODone (DESYREL) 100 MG tablet, Take 1 tablet by mouth every night at bedtime., Disp: , Rfl:     zolpidem (AMBIEN) 10 MG tablet, Take 1 tablet by mouth At Night As Needed for Sleep., Disp: , Rfl:      Allergies:   Allergies   Allergen Reactions    Penicillins Hives       Objective     Physical Exam:  Vital Signs:   Vitals:    08/26/24 1337   BP: 132/74   Pulse: 65   SpO2: 99%   Weight: 65.8 kg (145 lb)   Height: 158.8 cm (62.5\")     Body mass index is 26.1 kg/m².    Physical Exam  Vitals reviewed.   Constitutional:       General: She is not in acute distress.     Appearance: She is not toxic-appearing.   HENT:      Head: Normocephalic and atraumatic.      Mouth/Throat:      Mouth: Mucous membranes are moist.   Eyes:      Conjunctiva/sclera: Conjunctivae normal.   Cardiovascular:      Rate and Rhythm: Normal rate.      Heart sounds: Normal heart sounds.   Pulmonary:      Effort: Pulmonary effort is normal.      Breath sounds: Normal breath sounds.   Abdominal:      General: There is no distension.      Palpations: Abdomen is soft.   Musculoskeletal:         General: No swelling.      Cervical back: Neck supple.   Skin:     General: Skin is warm and dry.      Findings: No rash.   Neurological:      General: No focal deficit present.      Mental Status: She is alert and oriented to person, place, and time.   Psychiatric:         Mood and Affect: Mood normal.         Behavior: Behavior normal.       Results Review:   March 2024 chest CT scan showed improved right upper lobe disease.  Stable left upper lobe probable scarring from previous infectious/inflammatory process.    Alpha-1 antitrypsin genotype MS    Assessment / Plan      Assessment/Plan:   Diagnoses and all orders for this visit:    1. Abnormal chest CT (Primary)  -     CT Chest Without Contrast Diagnostic; Future  Most recent chest CT scan results reviewed and discussed with patient.  Schedule repeat scan to " ensure ongoing stability/improvement.    2. Pulmonary emphysema, unspecified emphysema type  -     albuterol sulfate  (90 Base) MCG/ACT inhaler; Inhale 2 puffs Every 4 (Four) Hours As Needed for Wheezing or Shortness of Air (Cough).  Dispense: 18 g; Refill: 5  Continue current inhaled regimen. We discussed the risk and benefits of inhaled corticosteroids. Patient instructed to take them on a regular basis as prescribed. Patient instructed to rinse their mouth out after each use.     3. Allergic rhinitis, unspecified seasonality, unspecified trigger  -     montelukast (SINGULAIR) 10 MG tablet; Take 1 tablet by mouth Every Night.  Dispense: 30 tablet; Refill: 5  Add Singulair to current regimen. Discussed possible side effects of medications. Risk and benefits of the medication were discussed with patient. Patient advised to stop taking the medicine if they experience any mood disturbances/suicidal thoughts.     4. Alpha-1-antitrypsin deficiency carrier  Carrier status, and therefore no treatment is necessary. Will continue to monitor yearly PFTs. Patient was advised of the possibility of children and grandchildren having the disease or being carriers. The patient was advised that their family members could and should be tested if desired.        Follow Up:   Return in about 6 months (around 2/26/2025) for Recheck.  The patient was counseled on diagnostic results, risks and benefits of treatment options, risk factor modifications and the importance of treatment compliance. The patient was advised to contact the clinic with concerns or worsening symptoms.     SORIN Marinelli   [facility] [place]     This document has been electronically signed by SORIN Marinelli  August 26, 2024

## 2024-09-18 ENCOUNTER — TRANSCRIBE ORDERS (OUTPATIENT)
Dept: ADMINISTRATIVE | Facility: HOSPITAL | Age: 69
End: 2024-09-18
Payer: MEDICARE

## 2024-09-18 DIAGNOSIS — Z12.31 ENCOUNTER FOR SCREENING MAMMOGRAM FOR BREAST CANCER: Primary | ICD-10-CM

## 2024-10-30 ENCOUNTER — HOSPITAL ENCOUNTER (OUTPATIENT)
Dept: CT IMAGING | Facility: HOSPITAL | Age: 69
Discharge: HOME | End: 2024-10-30
Admitting: NURSE PRACTITIONER
Payer: MEDICARE

## 2024-10-30 DIAGNOSIS — R93.89 ABNORMAL CHEST CT: ICD-10-CM

## 2024-10-30 PROCEDURE — 71250 CT THORAX DX C-: CPT

## 2024-12-27 DIAGNOSIS — J43.9 PULMONARY EMPHYSEMA, UNSPECIFIED EMPHYSEMA TYPE: ICD-10-CM

## 2024-12-27 DIAGNOSIS — J30.9 ALLERGIC RHINITIS, UNSPECIFIED SEASONALITY, UNSPECIFIED TRIGGER: ICD-10-CM

## 2024-12-30 RX ORDER — FLUTICASONE PROPIONATE 50 MCG
SPRAY, SUSPENSION (ML) NASAL
Qty: 48 G | Refills: 1 | Status: SHIPPED | OUTPATIENT
Start: 2024-12-30

## 2024-12-30 RX ORDER — UMECLIDINIUM BROMIDE AND VILANTEROL TRIFENATATE 62.5; 25 UG/1; UG/1
1 POWDER RESPIRATORY (INHALATION) DAILY
Qty: 60 EACH | Refills: 5 | OUTPATIENT
Start: 2024-12-30

## 2025-02-06 ENCOUNTER — HOSPITAL ENCOUNTER (OUTPATIENT)
Dept: GENERAL RADIOLOGY | Facility: HOSPITAL | Age: 70
Discharge: HOME | End: 2025-02-06
Admitting: INTERNAL MEDICINE
Payer: MEDICARE

## 2025-02-06 ENCOUNTER — TRANSCRIBE ORDERS (OUTPATIENT)
Dept: GENERAL RADIOLOGY | Facility: HOSPITAL | Age: 70
End: 2025-02-06
Payer: MEDICARE

## 2025-02-06 DIAGNOSIS — N20.0 KIDNEY STONE: Primary | ICD-10-CM

## 2025-02-06 DIAGNOSIS — N20.0 KIDNEY STONE: ICD-10-CM

## 2025-02-06 PROCEDURE — 74018 RADEX ABDOMEN 1 VIEW: CPT

## 2025-02-11 DIAGNOSIS — J30.9 ALLERGIC RHINITIS, UNSPECIFIED SEASONALITY, UNSPECIFIED TRIGGER: ICD-10-CM

## 2025-02-11 RX ORDER — MONTELUKAST SODIUM 10 MG/1
10 TABLET ORAL
Qty: 90 TABLET | Refills: 1 | Status: SHIPPED | OUTPATIENT
Start: 2025-02-11

## 2025-02-24 ENCOUNTER — OFFICE VISIT (OUTPATIENT)
Dept: PULMONOLOGY | Facility: CLINIC | Age: 70
End: 2025-02-24
Payer: MEDICARE

## 2025-02-24 VITALS
OXYGEN SATURATION: 98 % | SYSTOLIC BLOOD PRESSURE: 132 MMHG | BODY MASS INDEX: 27.46 KG/M2 | HEART RATE: 87 BPM | HEIGHT: 63 IN | DIASTOLIC BLOOD PRESSURE: 70 MMHG | WEIGHT: 155 LBS

## 2025-02-24 DIAGNOSIS — Z87.891 PERSONAL HISTORY OF NICOTINE DEPENDENCE: ICD-10-CM

## 2025-02-24 DIAGNOSIS — R93.89 ABNORMAL CHEST CT: ICD-10-CM

## 2025-02-24 DIAGNOSIS — J43.9 PULMONARY EMPHYSEMA, UNSPECIFIED EMPHYSEMA TYPE: Primary | ICD-10-CM

## 2025-02-24 PROCEDURE — 99213 OFFICE O/P EST LOW 20 MIN: CPT | Performed by: NURSE PRACTITIONER

## 2025-02-24 RX ORDER — RESVER/WINE/BFL/GRPSD/PC/C/POM 200MG-60MG
1 CAPSULE ORAL DAILY
COMMUNITY
Start: 2024-12-30

## 2025-02-24 RX ORDER — FLUTICASONE FUROATE AND VILANTEROL TRIFENATATE 100; 25 UG/1; UG/1
1 POWDER RESPIRATORY (INHALATION) DAILY
Qty: 1 EACH | Refills: 11 | Status: SHIPPED | OUTPATIENT
Start: 2025-02-24

## 2025-02-24 RX ORDER — MELOXICAM 15 MG/1
1 TABLET ORAL DAILY
COMMUNITY
Start: 2025-02-20

## 2025-02-24 RX ORDER — ALBUTEROL SULFATE 90 UG/1
2 INHALANT RESPIRATORY (INHALATION) EVERY 4 HOURS PRN
Qty: 18 G | Refills: 5 | Status: SHIPPED | OUTPATIENT
Start: 2025-02-24

## 2025-02-24 NOTE — PROGRESS NOTES
Follow Up Office Visit      Patient Name: Leann Antonio    Chief Complaint:    Chief Complaint   Patient presents with    Breathing Problem       History of Present Illness: Leann Antonio is a 69 y.o. female who is here today for follow up of emphysema.  Since last visit, she notes that her dyspnea has been stable.  She denies having any exacerbations.  She reports compliance with Breztri.  Short acting beta agonist uses 1-2 times per week at most.  No current cough or wheezing.  She feels that she is overall been doing very well and reports good functional capacity.    Supplemental Oxygen: No    Subjective      Review of Systems:  Review of Systems   Constitutional:  Negative for fever and unexpected weight change.   Respiratory:  Positive for shortness of breath. Negative for cough and wheezing.    Cardiovascular:  Negative for chest pain and leg swelling.        Past Medical History:   Past Medical History:   Diagnosis Date    Depression     GERD (gastroesophageal reflux disease)     Hyperlipidemia     Panic attacks     Pleurisy        Past Surgical History:   Past Surgical History:   Procedure Laterality Date    CHOLECYSTECTOMY      HYSTERECTOMY         Family History:   Family History   Problem Relation Age of Onset    Cirrhosis Mother     Cirrhosis Brother     Breast cancer Sister     Colon cancer Neg Hx        Social History:   Social History     Socioeconomic History    Marital status:    Tobacco Use    Smoking status: Former     Current packs/day: 0.00     Average packs/day: 0.5 packs/day for 30.0 years (15.0 ttl pk-yrs)     Types: Cigarettes     Start date: 1993     Quit date: 2023     Years since quittin.8     Passive exposure: Past    Smokeless tobacco: Never   Vaping Use    Vaping status: Former    Quit date: 2023    Substances: Nicotine   Substance and Sexual Activity    Alcohol use: No    Drug use: No    Sexual activity: Defer       Current Medications:     Current  Outpatient Medications:     albuterol sulfate  (90 Base) MCG/ACT inhaler, Inhale 2 puffs Every 4 (Four) Hours As Needed for Wheezing or Shortness of Air (Cough)., Disp: 18 g, Rfl: 5    Breo Ellipta 100-25 MCG/ACT aerosol powder , Inhale 1 puff Daily., Disp: , Rfl:     busPIRone (BUSPAR) 15 MG tablet, Take 2 tablets by mouth 2 (Two) Times a Day., Disp: , Rfl: 0    cetirizine (zyrTEC) 10 MG tablet, Take 1 tablet by mouth Daily., Disp: , Rfl:     D-5000 125 MCG (5000 UT) tablet, Take 1 tablet by mouth Daily., Disp: , Rfl:     DULoxetine (CYMBALTA) 30 MG capsule, Take 1 capsule by mouth Daily., Disp: , Rfl:     fenofibrate 160 MG tablet, TAKE 1 TABLET BY MOUTH DAILY, Disp: , Rfl: 2    fluticasone (FLONASE) 50 MCG/ACT nasal spray, SPRAY 1 SPRAY INTO THE NOSTRILS DAILY AS DIRECTED BY PROVIDER, Disp: 48 g, Rfl: 1    gabapentin (NEURONTIN) 400 MG capsule, Take 1 capsule by mouth Every Night., Disp: , Rfl:     levothyroxine (SYNTHROID, LEVOTHROID) 50 MCG tablet, Take 1 tablet by mouth Daily., Disp: , Rfl:     lisinopril (PRINIVIL,ZESTRIL) 10 MG tablet, , Disp: , Rfl:     meloxicam (MOBIC) 15 MG tablet, Take 1 tablet by mouth Daily., Disp: , Rfl:     montelukast (SINGULAIR) 10 MG tablet, TAKE 1 TABLET BY MOUTH EVERY DAY AT NIGHT, Disp: 90 tablet, Rfl: 1    pantoprazole (PROTONIX) 40 MG EC tablet, Take 1 tablet in the am 30 minutes before breakfast., Disp: 30 tablet, Rfl: 5    pravastatin (PRAVACHOL) 40 MG tablet, Take 1 tablet by mouth Daily., Disp: , Rfl:     QUEtiapine (SEROquel) 50 MG tablet, Take 1 tablet by mouth Every Night., Disp: , Rfl:     rosuvastatin (CRESTOR) 20 MG tablet, TAKE 1 TABLET BY MOUTH DAILY, Disp: , Rfl: 2    Sennosides-Docusate Sodium (Senna Plus) 8.6-50 MG per capsule, Take 1 capsule by mouth Daily., Disp: , Rfl:     simethicone (MYLICON) 80 MG chewable tablet, Chew 1 tablet Every 6 (Six) Hours As Needed for Flatulence., Disp: , Rfl:     traZODone (DESYREL) 100 MG tablet, Take 1 tablet by mouth  "every night at bedtime., Disp: , Rfl:     zolpidem (AMBIEN) 10 MG tablet, Take 1 tablet by mouth At Night As Needed for Sleep., Disp: , Rfl:      Allergies:   Allergies   Allergen Reactions    Penicillins Hives       Objective     Physical Exam:  Vital Signs:   Vitals:    02/24/25 1438   BP: 132/70   Pulse: 87   SpO2: 98%   Weight: 70.3 kg (155 lb)   Height: 158.8 cm (62.5\")     Body mass index is 27.9 kg/m².    Physical Exam  Vitals reviewed.   Constitutional:       General: She is not in acute distress.     Appearance: She is not toxic-appearing.   HENT:      Head: Normocephalic and atraumatic.      Mouth/Throat:      Mouth: Mucous membranes are moist.   Eyes:      Extraocular Movements: Extraocular movements intact.      Conjunctiva/sclera: Conjunctivae normal.   Cardiovascular:      Rate and Rhythm: Normal rate.      Heart sounds: Normal heart sounds.   Pulmonary:      Effort: Pulmonary effort is normal.      Breath sounds: Normal breath sounds.   Abdominal:      General: There is no distension.      Palpations: Abdomen is soft.   Musculoskeletal:         General: No swelling.      Cervical back: Neck supple.   Skin:     General: Skin is warm and dry.      Findings: No rash.   Neurological:      General: No focal deficit present.      Mental Status: She is alert and oriented to person, place, and time.   Psychiatric:         Mood and Affect: Mood normal.         Behavior: Behavior normal.       Results Review:   October 2024 chest CT scan showed stable bilateral upper lobe scarring compared to prior.  Emphysematous change.  Left nephrolithiasis.    Assessment / Plan      Assessment/Plan:   Diagnoses and all orders for this visit:    1. Pulmonary emphysema, unspecified emphysema type (Primary)  -     Breo Ellipta 100-25 MCG/ACT aerosol powder ; Inhale 1 puff Daily. Rinse mouth out after use  Dispense: 1 each; Refill: 11  -     albuterol sulfate  (90 Base) MCG/ACT inhaler; Inhale 2 puffs Every 4 (Four) " Hours As Needed for Wheezing or Shortness of Air (Cough).  Dispense: 18 g; Refill: 5  Stable symptoms.  Continue current inhaled regimen. We discussed the risk and benefits of inhaled corticosteroids. Patient instructed to take them on a regular basis as prescribed. Patient instructed to rinse their mouth out after each use. Patient was encouraged to maintain as much physical activity as she can safely tolerate.    2. Abnormal chest CT  Most recent chest CT scan results reviewed and discussed with patient, stable bilateral upper lobe scarring noted.  No need to repeat chest imaging unless clinically warranted by change in symptoms.    3. Personal history of nicotine dependence  Ongoing cessation advised.  Patient is certain that she has less than a 20-pack-year smoking history and therefore does not qualify for annual lung cancer screening scans.       Follow Up:   Return in about 1 year (around 2/24/2026) for Recheck.  The patient was counseled on diagnostic results, risks and benefits of treatment options, risk factor modifications and the importance of treatment compliance. The patient was advised to contact the clinic with concerns or worsening symptoms.     SORIN Marinelli   [facility]     This document has been electronically signed by SORIN Marinelli  February 24, 2025

## 2025-03-12 DIAGNOSIS — J30.9 ALLERGIC RHINITIS, UNSPECIFIED SEASONALITY, UNSPECIFIED TRIGGER: ICD-10-CM

## 2025-03-13 RX ORDER — FLUTICASONE PROPIONATE 50 MCG
SPRAY, SUSPENSION (ML) NASAL
Qty: 16 G | Refills: 1 | Status: SHIPPED | OUTPATIENT
Start: 2025-03-13

## 2025-04-14 DIAGNOSIS — J30.9 ALLERGIC RHINITIS, UNSPECIFIED SEASONALITY, UNSPECIFIED TRIGGER: ICD-10-CM

## 2025-04-14 RX ORDER — FLUTICASONE PROPIONATE 50 MCG
2 SPRAY, SUSPENSION (ML) NASAL DAILY
Qty: 48 G | Refills: 1 | Status: SHIPPED | OUTPATIENT
Start: 2025-04-14

## 2025-07-07 DIAGNOSIS — J30.9 ALLERGIC RHINITIS, UNSPECIFIED SEASONALITY, UNSPECIFIED TRIGGER: ICD-10-CM

## 2025-07-07 RX ORDER — FLUTICASONE PROPIONATE 50 MCG
2 SPRAY, SUSPENSION (ML) NASAL DAILY
Qty: 48 G | Refills: 1 | Status: SHIPPED | OUTPATIENT
Start: 2025-07-07

## 2025-08-07 DIAGNOSIS — J30.9 ALLERGIC RHINITIS, UNSPECIFIED SEASONALITY, UNSPECIFIED TRIGGER: ICD-10-CM

## 2025-08-07 RX ORDER — MONTELUKAST SODIUM 10 MG/1
10 TABLET ORAL
Qty: 90 TABLET | Refills: 1 | Status: SHIPPED | OUTPATIENT
Start: 2025-08-07

## 2025-08-11 ENCOUNTER — TRANSCRIBE ORDERS (OUTPATIENT)
Dept: ADMINISTRATIVE | Facility: HOSPITAL | Age: 70
End: 2025-08-11
Payer: MEDICARE

## 2025-08-11 DIAGNOSIS — Z12.31 VISIT FOR SCREENING MAMMOGRAM: Primary | ICD-10-CM
